# Patient Record
Sex: MALE | Race: WHITE | URBAN - METROPOLITAN AREA
[De-identification: names, ages, dates, MRNs, and addresses within clinical notes are randomized per-mention and may not be internally consistent; named-entity substitution may affect disease eponyms.]

---

## 2019-06-26 ENCOUNTER — OUTPATIENT (OUTPATIENT)
Dept: OUTPATIENT SERVICES | Facility: HOSPITAL | Age: 79
LOS: 1 days | Discharge: HOME | End: 2019-06-26
Payer: MEDICARE

## 2019-06-26 VITALS
SYSTOLIC BLOOD PRESSURE: 163 MMHG | DIASTOLIC BLOOD PRESSURE: 93 MMHG | OXYGEN SATURATION: 97 % | TEMPERATURE: 98 F | HEIGHT: 70 IN | HEART RATE: 79 BPM | WEIGHT: 242.07 LBS | RESPIRATION RATE: 20 BRPM

## 2019-06-26 DIAGNOSIS — E78.00 PURE HYPERCHOLESTEROLEMIA, UNSPECIFIED: ICD-10-CM

## 2019-06-26 DIAGNOSIS — I48.91 UNSPECIFIED ATRIAL FIBRILLATION: ICD-10-CM

## 2019-06-26 DIAGNOSIS — Z01.818 ENCOUNTER FOR OTHER PREPROCEDURAL EXAMINATION: ICD-10-CM

## 2019-06-26 DIAGNOSIS — M16.12 UNILATERAL PRIMARY OSTEOARTHRITIS, LEFT HIP: ICD-10-CM

## 2019-06-26 DIAGNOSIS — Z72.89 OTHER PROBLEMS RELATED TO LIFESTYLE: ICD-10-CM

## 2019-06-26 DIAGNOSIS — E78.5 HYPERLIPIDEMIA, UNSPECIFIED: ICD-10-CM

## 2019-06-26 DIAGNOSIS — N40.0 BENIGN PROSTATIC HYPERPLASIA WITHOUT LOWER URINARY TRACT SYMPTOMS: ICD-10-CM

## 2019-06-26 DIAGNOSIS — I10 ESSENTIAL (PRIMARY) HYPERTENSION: ICD-10-CM

## 2019-06-26 DIAGNOSIS — Z29.9 ENCOUNTER FOR PROPHYLACTIC MEASURES, UNSPECIFIED: ICD-10-CM

## 2019-06-26 DIAGNOSIS — Z98.890 OTHER SPECIFIED POSTPROCEDURAL STATES: Chronic | ICD-10-CM

## 2019-06-26 LAB
ANION GAP SERPL CALC-SCNC: 6 MMOL/L — SIGNIFICANT CHANGE UP (ref 5–17)
APPEARANCE UR: ABNORMAL
BASOPHILS # BLD AUTO: 0.02 K/UL — SIGNIFICANT CHANGE UP (ref 0–0.2)
BASOPHILS NFR BLD AUTO: 0.4 % — SIGNIFICANT CHANGE UP (ref 0–2)
BILIRUB UR-MCNC: NEGATIVE — SIGNIFICANT CHANGE UP
BLD GP AB SCN SERPL QL: SIGNIFICANT CHANGE UP
BUN SERPL-MCNC: 15 MG/DL — SIGNIFICANT CHANGE UP (ref 7–23)
CALCIUM SERPL-MCNC: 9.3 MG/DL — SIGNIFICANT CHANGE UP (ref 8.5–10.1)
CHLORIDE SERPL-SCNC: 106 MMOL/L — SIGNIFICANT CHANGE UP (ref 96–108)
CO2 SERPL-SCNC: 27 MMOL/L — SIGNIFICANT CHANGE UP (ref 22–31)
COLOR SPEC: YELLOW — SIGNIFICANT CHANGE UP
COMMENT - URINE: SIGNIFICANT CHANGE UP
CREAT SERPL-MCNC: 0.91 MG/DL — SIGNIFICANT CHANGE UP (ref 0.5–1.3)
DIFF PNL FLD: NEGATIVE — SIGNIFICANT CHANGE UP
EOSINOPHIL # BLD AUTO: 0.11 K/UL — SIGNIFICANT CHANGE UP (ref 0–0.5)
EOSINOPHIL NFR BLD AUTO: 2.2 % — SIGNIFICANT CHANGE UP (ref 0–6)
EPI CELLS # UR: SIGNIFICANT CHANGE UP
GLUCOSE SERPL-MCNC: 95 MG/DL — SIGNIFICANT CHANGE UP (ref 70–99)
GLUCOSE UR QL: NEGATIVE MG/DL — SIGNIFICANT CHANGE UP
HCT VFR BLD CALC: 47.7 % — SIGNIFICANT CHANGE UP (ref 39–50)
HGB BLD-MCNC: 15.9 G/DL — SIGNIFICANT CHANGE UP (ref 13–17)
IMM GRANULOCYTES NFR BLD AUTO: 0.2 % — SIGNIFICANT CHANGE UP (ref 0–1.5)
KETONES UR-MCNC: NEGATIVE — SIGNIFICANT CHANGE UP
LEUKOCYTE ESTERASE UR-ACNC: NEGATIVE — SIGNIFICANT CHANGE UP
LYMPHOCYTES # BLD AUTO: 1.48 K/UL — SIGNIFICANT CHANGE UP (ref 1–3.3)
LYMPHOCYTES # BLD AUTO: 30.2 % — SIGNIFICANT CHANGE UP (ref 13–44)
MCHC RBC-ENTMCNC: 32.4 PG — SIGNIFICANT CHANGE UP (ref 27–34)
MCHC RBC-ENTMCNC: 33.3 GM/DL — SIGNIFICANT CHANGE UP (ref 32–36)
MCV RBC AUTO: 97.3 FL — SIGNIFICANT CHANGE UP (ref 80–100)
MONOCYTES # BLD AUTO: 0.53 K/UL — SIGNIFICANT CHANGE UP (ref 0–0.9)
MONOCYTES NFR BLD AUTO: 10.8 % — SIGNIFICANT CHANGE UP (ref 2–14)
MRSA PCR RESULT.: SIGNIFICANT CHANGE UP
NEUTROPHILS # BLD AUTO: 2.75 K/UL — SIGNIFICANT CHANGE UP (ref 1.8–7.4)
NEUTROPHILS NFR BLD AUTO: 56.2 % — SIGNIFICANT CHANGE UP (ref 43–77)
NITRITE UR-MCNC: NEGATIVE — SIGNIFICANT CHANGE UP
PH UR: 6.5 — SIGNIFICANT CHANGE UP (ref 5–8)
PLATELET # BLD AUTO: 176 K/UL — SIGNIFICANT CHANGE UP (ref 150–400)
POTASSIUM SERPL-MCNC: 4.4 MMOL/L — SIGNIFICANT CHANGE UP (ref 3.5–5.3)
POTASSIUM SERPL-SCNC: 4.4 MMOL/L — SIGNIFICANT CHANGE UP (ref 3.5–5.3)
PROT UR-MCNC: 30 MG/DL
RBC # BLD: 4.9 M/UL — SIGNIFICANT CHANGE UP (ref 4.2–5.8)
RBC # FLD: 13.2 % — SIGNIFICANT CHANGE UP (ref 10.3–14.5)
RBC CASTS # UR COMP ASSIST: NEGATIVE /HPF — SIGNIFICANT CHANGE UP (ref 0–4)
S AUREUS DNA NOSE QL NAA+PROBE: SIGNIFICANT CHANGE UP
SODIUM SERPL-SCNC: 139 MMOL/L — SIGNIFICANT CHANGE UP (ref 135–145)
SP GR SPEC: 1.02 — SIGNIFICANT CHANGE UP (ref 1.01–1.02)
TYPE + AB SCN PNL BLD: SIGNIFICANT CHANGE UP
UROBILINOGEN FLD QL: NEGATIVE MG/DL — SIGNIFICANT CHANGE UP
WBC # BLD: 4.9 K/UL — SIGNIFICANT CHANGE UP (ref 3.8–10.5)
WBC # FLD AUTO: 4.9 K/UL — SIGNIFICANT CHANGE UP (ref 3.8–10.5)
WBC UR QL: SIGNIFICANT CHANGE UP

## 2019-06-26 PROCEDURE — 93010 ELECTROCARDIOGRAM REPORT: CPT

## 2019-06-26 PROCEDURE — 71046 X-RAY EXAM CHEST 2 VIEWS: CPT | Mod: 26

## 2019-06-26 NOTE — H&P PST ADULT - NSICDXPASTMEDICALHX_GEN_ALL_CORE_FT
PAST MEDICAL HISTORY:  Atrial fibrillation     BPH (benign prostatic hyperplasia)     BPH (Benign Prostatic Hypertrophy) laser 2010    H/O varicose veins     Hearing loss     HTN (hypertension)     Hypercholesteremia     Legally blind right eye    OA (osteoarthritis) of hip     Obesity PAST MEDICAL HISTORY:  Atrial fibrillation     BPH (Benign Prostatic Hypertrophy) laser 2010    H/O varicose veins     Hearing loss     HTN (hypertension)     Hypercholesteremia     Legally blind right eye    OA (osteoarthritis) of hip     Obesity

## 2019-06-26 NOTE — H&P PST ADULT - NSANTHOSAYNRD_GEN_A_CORE
No. MARIA C screening performed.  STOP BANG Legend: 0-2 = LOW Risk; 3-4 = INTERMEDIATE Risk; 5-8 = HIGH Risk

## 2019-06-26 NOTE — H&P PST ADULT - HISTORY OF PRESENT ILLNESS
79 y/o male with left hip osteoarthritis. Complain of chronic left hip pain. Denies taking medications, "they don't help." Scheduled for left THR. 77 y/o male with left hip osteoarthritis. Complain of chronic left hip pain. Denies taking medications, "they don't help." Pt has difficulty walking due to hip pain. Scheduled for left THR.

## 2019-06-26 NOTE — H&P PST ADULT - ASSESSMENT
77 y/o male with left hip osteoarthritis. Complain of chronic left hip pain. Scheduled for left THR.   Plan  1. Stop all NSAIDS, herbal supplements and vitamins for 7 days.  2. NPO at midnight.  3. Take the following medications ( Metoprolol, Lisinopril) with small sips of water on the morning of your procedure/surgery.  4. Use EZ sponges as directed  5. Use mupirocin as directed  6. Labs, EKG, CXR as per surgeon  7. PMD/cardiologist visit for optimization prior to surgery as per surgeon.    CAPRINI SCORE [CLOT]  AGE RELATED RISK FACTORS                                                       MOBILITY RELATED FACTORS  [ ] Age 41-60 years                                            (1 Point)                  [ ] Bed rest                                                        (1 Point)  [ ] Age: 61-74 years                                           (2 Points)                 [ ] Plaster cast                                                   (2 Points)  [ x ] Age= 75 years                                              (3 Points)                 [ ] Bed bound for more than 72 hours                 (2 Points)    DISEASE RELATED RISK FACTORS                                               GENDER SPECIFIC FACTORS  [ ] Edema in the lower extremities                       (1 Point)                  [ ] Pregnancy                                                     (1 Point)  [ ] Varicose veins                                               (1 Point)                  [ ] Post-partum < 6 weeks                                   (1 Point)             [ x ] BMI > 25 Kg/m2                                            (1 Point)                  [ ] Hormonal therapy  or oral contraception          (1 Point)                 [ ] Sepsis (in the previous month)                        (1 Point)                  [ ] History of pregnancy complications                 (1 point)  [ ] Pneumonia or serious lung disease                                               [ ] Unexplained or recurrent                     (1 Point)           (in the previous month)                               (1 Point)  [ ] Abnormal pulmonary function test                     (1 Point)                 SURGERY RELATED RISK FACTORS  [ ] Acute myocardial infarction                              (1 Point)                 [ ]  Section                                             (1 Point)  [ ] Congestive heart failure (in the previous month)  (1 Point)               [ ] Minor surgery                                                  (1 Point)   [ ] Inflammatory bowel disease                             (1 Point)                 [ ] Arthroscopic surgery                                        (2 Points)  [ ] Central venous access                                      (2 Points)                [ ] General surgery lasting more than 45 minutes   (2 Points)       [ ] Stroke (in the previous month)                          (5 Points)               [x  ] Elective arthroplasty                                         (5 Points)     ()  malignancy                                                             (2 points )                                                                                                                                      HEMATOLOGY RELATED FACTORS                                                 TRAUMA RELATED RISK FACTORS  [ ] Prior episodes of VTE                                     (3 Points)                 [ ] Fracture of the hip, pelvis, or leg                       (5 Points)  [ ] Positive family history for VTE                         (3 Points)                 [ ] Acute spinal cord injury (in the previous month)  (5 Points)  [ ] Prothrombin 20736 A                                     (3 Points)                 [ ] Paralysis  (less than 1 month)                             (5 Points)  [ ] Factor V Leiden                                             (3 Points)                  [ ] Multiple Trauma within 1 month                        (5 Points)  [ ] Lupus anticoagulants                                     (3 Points)                                                           [ ] Anticardiolipin antibodies                               (3 Points)                                                       [ ] High homocysteine in the blood                      (3 Points)                                             [ ] Other congenital or acquired thrombophilia      (3 Points)                                                [ ] Heparin induced thrombocytopenia                  (3 Points)    (  ) Malignancy                                        Total Score [     9     ]

## 2019-06-26 NOTE — H&P PST ADULT - NSICDXPASTSURGICALHX_GEN_ALL_CORE_FT
PAST SURGICAL HISTORY:  H/O colonoscopy     H/O cystoscopy TURP 2017    History of Cataract Surgery right eye 2010

## 2019-07-11 ENCOUNTER — RESULT REVIEW (OUTPATIENT)
Age: 79
End: 2019-07-11

## 2019-07-11 ENCOUNTER — INPATIENT (INPATIENT)
Facility: HOSPITAL | Age: 79
LOS: 2 days | Discharge: HOME HEALTH | End: 2019-07-14
Attending: ORTHOPAEDIC SURGERY | Admitting: ORTHOPAEDIC SURGERY
Payer: MEDICARE

## 2019-07-11 ENCOUNTER — TRANSCRIPTION ENCOUNTER (OUTPATIENT)
Age: 79
End: 2019-07-11

## 2019-07-11 VITALS
WEIGHT: 238.1 LBS | OXYGEN SATURATION: 96 % | DIASTOLIC BLOOD PRESSURE: 117 MMHG | RESPIRATION RATE: 17 BRPM | TEMPERATURE: 98 F | HEART RATE: 86 BPM | SYSTOLIC BLOOD PRESSURE: 176 MMHG

## 2019-07-11 DIAGNOSIS — Z98.890 OTHER SPECIFIED POSTPROCEDURAL STATES: Chronic | ICD-10-CM

## 2019-07-11 LAB
ANION GAP SERPL CALC-SCNC: 6 MMOL/L — SIGNIFICANT CHANGE UP (ref 5–17)
BUN SERPL-MCNC: 17 MG/DL — SIGNIFICANT CHANGE UP (ref 7–23)
CALCIUM SERPL-MCNC: 8.5 MG/DL — SIGNIFICANT CHANGE UP (ref 8.5–10.1)
CHLORIDE SERPL-SCNC: 108 MMOL/L — SIGNIFICANT CHANGE UP (ref 96–108)
CO2 SERPL-SCNC: 27 MMOL/L — SIGNIFICANT CHANGE UP (ref 22–31)
CREAT SERPL-MCNC: 0.92 MG/DL — SIGNIFICANT CHANGE UP (ref 0.5–1.3)
GLUCOSE SERPL-MCNC: 86 MG/DL — SIGNIFICANT CHANGE UP (ref 70–99)
HCT VFR BLD CALC: 41.3 % — SIGNIFICANT CHANGE UP (ref 39–50)
HGB BLD-MCNC: 13.5 G/DL — SIGNIFICANT CHANGE UP (ref 13–17)
INR BLD: 1.09 RATIO — SIGNIFICANT CHANGE UP (ref 0.88–1.16)
MCHC RBC-ENTMCNC: 32.7 GM/DL — SIGNIFICANT CHANGE UP (ref 32–36)
MCHC RBC-ENTMCNC: 32.7 PG — SIGNIFICANT CHANGE UP (ref 27–34)
MCV RBC AUTO: 100 FL — SIGNIFICANT CHANGE UP (ref 80–100)
PLATELET # BLD AUTO: 154 K/UL — SIGNIFICANT CHANGE UP (ref 150–400)
POTASSIUM SERPL-MCNC: 4.3 MMOL/L — SIGNIFICANT CHANGE UP (ref 3.5–5.3)
POTASSIUM SERPL-SCNC: 4.3 MMOL/L — SIGNIFICANT CHANGE UP (ref 3.5–5.3)
PROTHROM AB SERPL-ACNC: 12.1 SEC — SIGNIFICANT CHANGE UP (ref 10–12.9)
RBC # BLD: 4.13 M/UL — LOW (ref 4.2–5.8)
RBC # FLD: 13.2 % — SIGNIFICANT CHANGE UP (ref 10.3–14.5)
SODIUM SERPL-SCNC: 141 MMOL/L — SIGNIFICANT CHANGE UP (ref 135–145)
WBC # BLD: 5.23 K/UL — SIGNIFICANT CHANGE UP (ref 3.8–10.5)
WBC # FLD AUTO: 5.23 K/UL — SIGNIFICANT CHANGE UP (ref 3.8–10.5)

## 2019-07-11 PROCEDURE — 88305 TISSUE EXAM BY PATHOLOGIST: CPT | Mod: 26

## 2019-07-11 PROCEDURE — 73501 X-RAY EXAM HIP UNI 1 VIEW: CPT | Mod: 26,LT

## 2019-07-11 PROCEDURE — 99223 1ST HOSP IP/OBS HIGH 75: CPT

## 2019-07-11 PROCEDURE — 27130 TOTAL HIP ARTHROPLASTY: CPT | Mod: AS

## 2019-07-11 RX ORDER — DOCUSATE SODIUM 100 MG
1 CAPSULE ORAL
Qty: 14 | Refills: 0
Start: 2019-07-11 | End: 2019-07-17

## 2019-07-11 RX ORDER — PANTOPRAZOLE SODIUM 20 MG/1
1 TABLET, DELAYED RELEASE ORAL
Qty: 30 | Refills: 0
Start: 2019-07-11 | End: 2019-08-09

## 2019-07-11 RX ORDER — HYDROMORPHONE HYDROCHLORIDE 2 MG/ML
0.5 INJECTION INTRAMUSCULAR; INTRAVENOUS; SUBCUTANEOUS
Refills: 0 | Status: DISCONTINUED | OUTPATIENT
Start: 2019-07-11 | End: 2019-07-11

## 2019-07-11 RX ORDER — DOCUSATE SODIUM 100 MG
100 CAPSULE ORAL THREE TIMES A DAY
Refills: 0 | Status: DISCONTINUED | OUTPATIENT
Start: 2019-07-11 | End: 2019-07-14

## 2019-07-11 RX ORDER — SODIUM CHLORIDE 9 MG/ML
1000 INJECTION, SOLUTION INTRAVENOUS
Refills: 0 | Status: DISCONTINUED | OUTPATIENT
Start: 2019-07-11 | End: 2019-07-14

## 2019-07-11 RX ORDER — ONDANSETRON 8 MG/1
4 TABLET, FILM COATED ORAL EVERY 6 HOURS
Refills: 0 | Status: DISCONTINUED | OUTPATIENT
Start: 2019-07-11 | End: 2019-07-14

## 2019-07-11 RX ORDER — PANTOPRAZOLE SODIUM 20 MG/1
40 TABLET, DELAYED RELEASE ORAL ONCE
Refills: 0 | Status: COMPLETED | OUTPATIENT
Start: 2019-07-11 | End: 2019-07-11

## 2019-07-11 RX ORDER — HYDROMORPHONE HYDROCHLORIDE 2 MG/ML
0.5 INJECTION INTRAMUSCULAR; INTRAVENOUS; SUBCUTANEOUS EVERY 4 HOURS
Refills: 0 | Status: DISCONTINUED | OUTPATIENT
Start: 2019-07-11 | End: 2019-07-14

## 2019-07-11 RX ORDER — OXYCODONE HYDROCHLORIDE 5 MG/1
10 TABLET ORAL EVERY 4 HOURS
Refills: 0 | Status: DISCONTINUED | OUTPATIENT
Start: 2019-07-11 | End: 2019-07-14

## 2019-07-11 RX ORDER — OXYCODONE HYDROCHLORIDE 5 MG/1
5 TABLET ORAL EVERY 4 HOURS
Refills: 0 | Status: DISCONTINUED | OUTPATIENT
Start: 2019-07-11 | End: 2019-07-14

## 2019-07-11 RX ORDER — SODIUM CHLORIDE 9 MG/ML
1000 INJECTION, SOLUTION INTRAVENOUS
Refills: 0 | Status: DISCONTINUED | OUTPATIENT
Start: 2019-07-11 | End: 2019-07-11

## 2019-07-11 RX ORDER — APIXABAN 2.5 MG/1
1 TABLET, FILM COATED ORAL
Qty: 0 | Refills: 0 | DISCHARGE

## 2019-07-11 RX ORDER — ACETAMINOPHEN 500 MG
650 TABLET ORAL EVERY 6 HOURS
Refills: 0 | Status: DISCONTINUED | OUTPATIENT
Start: 2019-07-11 | End: 2019-07-14

## 2019-07-11 RX ORDER — ACETAMINOPHEN 500 MG
1000 TABLET ORAL ONCE
Refills: 0 | Status: COMPLETED | OUTPATIENT
Start: 2019-07-11 | End: 2019-07-11

## 2019-07-11 RX ORDER — SENNA PLUS 8.6 MG/1
2 TABLET ORAL AT BEDTIME
Refills: 0 | Status: DISCONTINUED | OUTPATIENT
Start: 2019-07-11 | End: 2019-07-14

## 2019-07-11 RX ORDER — OXYCODONE HYDROCHLORIDE 5 MG/1
10 TABLET ORAL ONCE
Refills: 0 | Status: DISCONTINUED | OUTPATIENT
Start: 2019-07-11 | End: 2019-07-11

## 2019-07-11 RX ORDER — METOPROLOL TARTRATE 50 MG
100 TABLET ORAL DAILY
Refills: 0 | Status: DISCONTINUED | OUTPATIENT
Start: 2019-07-11 | End: 2019-07-14

## 2019-07-11 RX ORDER — PANTOPRAZOLE SODIUM 20 MG/1
40 TABLET, DELAYED RELEASE ORAL
Refills: 0 | Status: DISCONTINUED | OUTPATIENT
Start: 2019-07-11 | End: 2019-07-14

## 2019-07-11 RX ORDER — APIXABAN 2.5 MG/1
5 TABLET, FILM COATED ORAL EVERY 12 HOURS
Refills: 0 | Status: DISCONTINUED | OUTPATIENT
Start: 2019-07-12 | End: 2019-07-14

## 2019-07-11 RX ORDER — ACETAMINOPHEN 500 MG
650 TABLET ORAL ONCE
Refills: 0 | Status: COMPLETED | OUTPATIENT
Start: 2019-07-11 | End: 2019-07-11

## 2019-07-11 RX ORDER — LISINOPRIL 2.5 MG/1
5 TABLET ORAL DAILY
Refills: 0 | Status: DISCONTINUED | OUTPATIENT
Start: 2019-07-11 | End: 2019-07-14

## 2019-07-11 RX ORDER — CEFAZOLIN SODIUM 1 G
2000 VIAL (EA) INJECTION EVERY 8 HOURS
Refills: 0 | Status: COMPLETED | OUTPATIENT
Start: 2019-07-11 | End: 2019-07-12

## 2019-07-11 RX ORDER — POLYETHYLENE GLYCOL 3350 17 G/17G
17 POWDER, FOR SOLUTION ORAL DAILY
Refills: 0 | Status: DISCONTINUED | OUTPATIENT
Start: 2019-07-11 | End: 2019-07-14

## 2019-07-11 RX ORDER — FENTANYL CITRATE 50 UG/ML
50 INJECTION INTRAVENOUS
Refills: 0 | Status: DISCONTINUED | OUTPATIENT
Start: 2019-07-11 | End: 2019-07-11

## 2019-07-11 RX ORDER — BENZOCAINE AND MENTHOL 5; 1 G/100ML; G/100ML
1 LIQUID ORAL EVERY 4 HOURS
Refills: 0 | Status: DISCONTINUED | OUTPATIENT
Start: 2019-07-11 | End: 2019-07-14

## 2019-07-11 RX ADMIN — OXYCODONE HYDROCHLORIDE 10 MILLIGRAM(S): 5 TABLET ORAL at 23:30

## 2019-07-11 RX ADMIN — OXYCODONE HYDROCHLORIDE 10 MILLIGRAM(S): 5 TABLET ORAL at 17:48

## 2019-07-11 RX ADMIN — Medication 1000 MILLIGRAM(S): at 19:30

## 2019-07-11 RX ADMIN — Medication 400 MILLIGRAM(S): at 19:15

## 2019-07-11 RX ADMIN — OXYCODONE HYDROCHLORIDE 10 MILLIGRAM(S): 5 TABLET ORAL at 18:15

## 2019-07-11 RX ADMIN — Medication 100 MILLIGRAM(S): at 21:44

## 2019-07-11 RX ADMIN — OXYCODONE HYDROCHLORIDE 10 MILLIGRAM(S): 5 TABLET ORAL at 23:00

## 2019-07-11 RX ADMIN — Medication 650 MILLIGRAM(S): at 11:36

## 2019-07-11 RX ADMIN — PANTOPRAZOLE SODIUM 40 MILLIGRAM(S): 20 TABLET, DELAYED RELEASE ORAL at 11:36

## 2019-07-11 NOTE — PHYSICAL THERAPY INITIAL EVALUATION ADULT - LEVEL OF INDEPENDENCE, REHAB EVAL
ABD pillow between B legs ,rolling to R uninvolved hip/moderate assist (50% patients effort)/maximum assist (25% patients effort)

## 2019-07-11 NOTE — PHYSICAL THERAPY INITIAL EVALUATION ADULT - ASR EQUIP NEEDS DISCH PT EVAL
3:1 commode/pt states he has a RW used by his wife ,needs to check if it can be adjusted  for his height

## 2019-07-11 NOTE — CONSULT NOTE ADULT - SUBJECTIVE AND OBJECTIVE BOX
HPI:      Patient is a 78y old  Male who presents with a chief complaint of Left hip arthroplasty (2019 08:30)  pt s/p  left thr on 19.    Consulted by Dr. Don Strickland   for VTE prophylaxis, risk stratification, and anticoagulation management.    PAST MEDICAL & SURGICAL HISTORY:  H/O varicose veins  OA (osteoarthritis) of hip  Hypercholesteremia  HTN (hypertension)  Atrial fibrillation on eliquis  Legally blind: right eye  Hearing loss  Obesity  BPH (Benign Prostatic Hypertrophy): laser   H/O colonoscopy  H/O cystoscopy: TURP   History of Cataract Surgery: right eye 2010    CAPRINI SCORE  AGE RELATED RISK FACTORS                                                       MOBILITY RELATED FACTORS  [ ] Age 41-60 years                                            (1 Point)                  [ ] Bed rest                                                        (1 Point)  [ ] Age: 61-74 years                                           (2 Points)                [ ] Plaster cast                                                   (2 Points)  [ x] Age= 75 years                                              (3 Points)                 [ ] Bed bound for more than 72 hours                   (2 Points)    DISEASE RELATED RISK FACTORS                                               GENDER SPECIFIC FACTORS  [ ] Edema in the lower extremities                       (1 Point)           [ ] Pregnancy                                                            (1 Point)  [ ] Varicose veins                                               (1 Point)                  [ ] Post-partum < 6 weeks                                      (1 Point)             [ x] BMI > 25 Kg/m2                                            (1 Point)                  [ ] Hormonal therapy or oral contraception       (1 Point)                 [ ] Sepsis (in the previous month)                        (1 Point)             [ ] History of pregnancy complications                (1Point)  [ ] Pneumonia or serious lung disease                                             [ ] Unexplained or recurrent  (=/>3), premature                                 (In the previous month)                               (1 Point)                birth with toxemia or growth-restricted infant (1 Point)  [ ] Abnormal pulmonary function test            (1 Point)                                   SURGERY RELATED RISK FACTORS  [ ] Acute myocardial infarction                       (1 Point)                  [ ]  Section                                         (1 Point)  [ ] Congestive heart failure (in the previous month) (1 Point)   [ ] Minor surgery   lasting <45 minutes       (1 Point)   [ ] Inflammatory bowel disease                             (1 Point)          [ ] Arthroscopic surgery                                  (2 Points)  [ ] Central venous access                                    (2 Points)            [ ] General surgery lasting >45 minutes      (2 Points)       [ ] Stroke (in the previous month)                  (5 Points)            [x ] Elective major lower extremity arthroplasty (5 Points)                                   [  ] Malignancy (present or past include skin melanoma                                          but exclude  basal skin cell)    (2 points)                                      TRAUMA RELATED RISK FACTORS                HEMATOLOGY RELATED FACTORS                                  [ ] Fracture of the hip, pelvis, or leg                       (5 Points)  [ ] Prior episodes of VTE                                     (3 Points)          [ ] Acute spinal cord injury (in the previous month)  (5 Points)  [ ] Positive family history for VTE                         (3 Points)       [ ] Paralysis (less than 1 month)                          (5 Points)  [ ] Prothrombin 40513 A                                      (3 Points)         [ ] Multiple Trauma (within 1month)                 (5Points)                                                                                                                                                                [ ] Factor V Leiden                                          (3 Points)                                OTHER RISK FACTORS                          [ ] Lupus anticoagulants                                     (3 Points)                       [ ] BMI > 40                          (1 Point)                                                         [ ] Anticardiolipin antibodies                                (3 Points)                   [ ] Smoking                              (1Point)                                                [ ] High homocysteine in the blood                      (3 Points)                [  ] Diabetes requiring insulin (1point)                         [ ] Other congenital or acquired thrombophilia       (3 Points)          [  ] Chemotherapy                   (1 Point)  [ ] Heparin induced thrombocytopenia                  (3 Points)             [  ] Blood Transfusion                (1 point)                                                                                                             Total Score [8 ]                                                                                                                                                                                                                                     [id number] Score: 3    IMPROVE Bleeding Risk Score: 2.5    Falls Risk:   High ( x )  Mod (  )  Low (  )    EBL: 200  ml  cr cl: 98.4  19 Pt seen at bedside on [address], with daughters present.  pt in pain scaled 10/10.  RN aware getting pain meds for pt.  Discussed his anticoagualtion with eliquis , which he is on normally for his A. Fib.  Made him aware we will resume it in the a.m due to his late surgical procedure.   Vital Signs Last 24 Hrs  T(C): 36.1 (2019 17:45), Max: 36.6 (2019 14:45)  T(F): 97 (2019 17:45), Max: 97.9 (2019 14:45)  HR: 83 (2019 18:20) (60 - 86)  BP: 143/89 (2019 18:20) (104/63 - 176/117)  BP(mean): --  RR: 16 (2019 18:20) (14 - 22)  SpO2: 95% (2019 18:20) (95% - 100%)  FAMILY HISTORY:  Family history of melanoma: father    Denies any personal or familial history of clotting or bleeding disorders.    Allergies    No Known Allergies    Intolerances        REVIEW OF SYSTEMS    (  )Fever	     (  )Constipation	(  )SOB				(  )Headache	(  )Dysuria  (  )Chills	     (  )Melena	(  )Dyspnea present on exertion	                    (  )Dizziness                    (  )Polyuria  (  )Nausea	     (  )Hematochezia	(  )Cough			                    (  )Syncope   	(  )Hematuria  (  )Vomiting    (  )Chest Pain	(  )Wheezing			( x )Weakness   (x) pain left hip  (  )Diarrhea     (  )Palpitations	(  )Anorexia			(x  )Myalgia    Pertinent positives in HPI and daily subjective.  All other ROS negative.    PHYSICAL EXAM:    Constitutional: Appears Well    Neurological: A& O x 3    Skin: Warm    Respiratory and Thorax: normal effort; Breath sounds: normal; No rales/wheezing/rhonchi  	  Cardiovascular: S1, S2, regular, NMBR	    Gastrointestinal: BS + x 4Q, nontender	    Genitourinary:  Bladder nondistended, nontender    Musculoskeletal:   General Right:   no muscle/joint tenderness,   normal tone, no joint swelling,   ROM: full	    General Left:   no muscle/joint tenderness,   normal tone, no joint swelling,   ROM: limitedl    Hip:  left: Dressing CDI; Drain:   Lower extrems:   Right: no calf tenderness              negative joshua's sign               + pedal pulses    Left:   no calf tenderness              negative joshua's sign               + pedal pulses                          13.5   5.23  )-----------( 154      ( 2019 15:08 )             41.3       07-11    141  |  108  |  17  ----------------------------<  86  4.3   |  27  |  0.92    Ca    8.5      2019 15:08        PT/INR - ( 2019 10:54 )   PT: 12.1 sec;   INR: 1.09 ratio         				    MEDICATIONS  (STANDING):  ceFAZolin   IVPB 2000 milliGRAM(s) IV Intermittent every 8 hours  docusate sodium 100 milliGRAM(s) Oral three times a day  lactated ringers. 1000 milliLiter(s) IV Continuous <Continuous>  lisinopril 5 milliGRAM(s) Oral daily  metoprolol succinate  milliGRAM(s) Oral daily  pantoprazole    Tablet 40 milliGRAM(s) Oral before breakfast  polyethylene glycol 3350 17 Gram(s) Oral daily          DVT Prophylaxis:  LMWH                   (  )  Heparin SQ           (  )  Coumadin             (  )  Xarelto                  (  )  Eliquis                   ( in am )  Venodynes           (x  )  Ambulation          ( x )  UFH                       (  )  Contraindicated  (  )  EC Aspirin             (  )

## 2019-07-11 NOTE — DISCHARGE NOTE PROVIDER - HOSPITAL COURSE
H&P:    Pt is a 78y Male  PAST MEDICAL & SURGICAL HISTORY:    H/O varicose veins    OA (osteoarthritis) of hip    Hypercholesteremia    HTN (hypertension)    Atrial fibrillation    Legally blind: right eye    Hearing loss    Obesity    BPH (Benign Prostatic Hypertrophy): laser 2010    H/O colonoscopy    H/O cystoscopy: TURP 2017    History of Cataract Surgery: right eye 2010             Now s/p Left Total Hip Arthroplasty. Pt is afebrile with stable vital signs. Pain is controlled. Alert and Oriented. Exam reveals intact EHL FHL TA GS, +DP. Dressing is clean and dry with a New Aquacel bandage on.        VITALS**    LABS**        Hospital Course:    Patient presented to [facility] medically cleared for elective Left Hip Replacement Surgery, having failed outpatient conservative management. Prophylactic antibiotics were started before the procedure and continued for 24 hours. They were admitted after surgery to the orthopedic floor.   There were no complications during the hospital stay. All home medications were continued.         **Pt received **U PRBC post op for Acute Blood loss Anemia.        Routine consults were obtained from the Anticoagulation Team for DVT/PE prophylaxis, from Physical Therapy for twice daily PT, and from the Hospitalist for Medical Co-management. Patient was placed on anticoagulation.  Pertinent home medications were continued.  Daily labs were followed.          POD 0 pt was stable overnight.  No Major issues POD1-2. Pt received PT twice daily, and a new Aquacel dressing was applied prior to discharge. The plan is for for DC to home with home PT** or to Rehab for ongoing PT**.  The orthopedic Attending is aware and agrees. H&P:    Pt is a 78y Male      PAST MEDICAL & SURGICAL HISTORY:    H/O varicose veins    OA (osteoarthritis) of hip    Hypercholesteremia    HTN (hypertension)    Atrial fibrillation    Legally blind: right eye    Hearing loss    Obesity    BPH (Benign Prostatic Hypertrophy): laser 2010    H/O colonoscopy    H/O cystoscopy: TURP 2017    History of Cataract Surgery: right eye 2010             Now s/p Left Total Hip Arthroplasty. Pt is afebrile with stable vital signs. Pain is controlled. Alert and Oriented. Exam reveals intact EHL FHL TA GS, +DP. Dressing is clean and dry with a New Aquacel bandage on.        VITALS**    LABS**        Hospital Course:    Patient presented to [facility] medically cleared for elective Left Total Hip Replacement Surgery, having failed outpatient conservative management. Prophylactic antibiotics were started before the procedure and continued for 24 hours. They were admitted after surgery to the orthopedic floor.   There were no complications during the hospital stay. All home medications were continued.         **Pt received **U PRBC post op for Acute Blood loss Anemia.        Routine consults were obtained from the Anticoagulation Team for DVT/PE prophylaxis, from Physical Therapy for twice daily PT, and from the Hospitalist for Medical Co-management. Patient was placed on anticoagulation.  Pertinent home medications were continued.  Daily labs were followed.          POD 0 pt was stable overnight.  No Major issues POD1-2. Pt received PT twice daily, and a new Aquacel dressing was applied prior to discharge. The plan is for DC to home with home PT** or to Rehab for ongoing PT**.  The orthopedic Attending is aware and agrees. H&P:    Pt is a 78y Male      PAST MEDICAL & SURGICAL HISTORY:    H/O varicose veins    OA (osteoarthritis) of hip    Hypercholesteremia    HTN (hypertension)    Atrial fibrillation    Legally blind: right eye    Hearing loss    Obesity    BPH (Benign Prostatic Hypertrophy): laser 2010    H/O colonoscopy    H/O cystoscopy: TURP 2017    History of Cataract Surgery: right eye 2010             Now s/p Left Total Hip Arthroplasty. Pt is afebrile with stable vital signs. Pain is controlled. Alert and Oriented. Exam reveals intact EHL FHL TA GS, +DP. Dressing is clean and dry with a New Aquacel bandage on.        ICU Vital Signs Last 24 Hrs    T(C): 36.8 (14 Jul 2019 05:01), Max: 36.9 (13 Jul 2019 23:42)    T(F): 98.2 (14 Jul 2019 05:01), Max: 98.4 (13 Jul 2019 23:42)    HR: 77 (14 Jul 2019 05:01) (77 - 94)    BP: 122/65 (14 Jul 2019 05:01) (107/59 - 135/74)    BP(mean): 71 (13 Jul 2019 10:58) (71 - 71)    ABP: --    ABP(mean): --    RR: 18 (14 Jul 2019 05:01) (17 - 18)    SpO2: 96% (14 Jul 2019 05:01) (94% - 96%)                            13.6     8.29  )-----------( 157      ( 14 Jul 2019 06:53 )               40.4     07-14        139  |  106  |  18    ----------------------------<  90    3.8   |  27  |  0.63        Ca    9.0      14 Jul 2019 06:53                Hospital Course:    Patient presented to [facility] medically cleared for elective Left Total Hip Replacement Surgery, having failed outpatient conservative management. Prophylactic antibiotics were started before the procedure and continued for 24 hours. They were admitted after surgery to the orthopedic floor.   There were no complications during the hospital stay. All home medications were continued.         **Pt received **U PRBC post op for Acute Blood loss Anemia.        Routine consults were obtained from the Anticoagulation Team for DVT/PE prophylaxis, from Physical Therapy for twice daily PT, and from the Hospitalist for Medical Co-management. Patient was placed on anticoagulation.  Pertinent home medications were continued.  Daily labs were followed.          POD 0 pt was stable overnight.  No Major issues POD1-2. Pt received PT twice daily, and a new Aquacel dressing was applied prior to discharge. The plan is for DC to home with home PT.  The orthopedic Attending is aware and agrees.

## 2019-07-11 NOTE — PROGRESS NOTE ADULT - ASSESSMENT
A/P:  79 yo M s/p L MACHO POD 0:  -stable tolerated procedure well  -Abduction pillow  -posterior dislocation precautions  -pain control  -WBAT/PT/OT  -dvt ppx in am  -SCDs/Ice/OOB/IS  -FU Labs  -dispo planning

## 2019-07-11 NOTE — PHYSICAL THERAPY INITIAL EVALUATION ADULT - DISCHARGE DISPOSITION, PT EVAL
home w/ home PT/home w/ assist/pt states daughters will be staying with him on discharge while he recuperates

## 2019-07-11 NOTE — PROGRESS NOTE ADULT - SUBJECTIVE AND OBJECTIVE BOX
POST OP CHECK:  Pt stable, tolerated procedure well. Pt complaining of some pain, improved with repositioning. No other issues:    Vital Signs Last 24 Hrs  T(C): 36.1 (11 Jul 2019 17:45), Max: 36.6 (11 Jul 2019 14:45)  T(F): 97 (11 Jul 2019 17:45), Max: 97.9 (11 Jul 2019 14:45)  HR: 83 (11 Jul 2019 18:20) (60 - 86)  BP: 143/89 (11 Jul 2019 18:20) (104/63 - 176/117)  BP(mean): --  RR: 16 (11 Jul 2019 18:20) (14 - 22)  SpO2: 95% (11 Jul 2019 18:20) (95% - 100%)    PE:  Gen: NAD  LLE:   Dressing CDI  Compartments soft and compressible  No calf ttp  Abduction pillow in place  SCDs in place  +EHL/FHL/Gsc/TA  SILT L2-S1  2+ DP

## 2019-07-11 NOTE — PHYSICAL THERAPY INITIAL EVALUATION ADULT - ACTIVE RANGE OF MOTION EXAMINATION, REHAB EVAL
Left LE Active ROM was WFL (within functional limits)/AAROM LLE observing L THPs/bilateral upper extremity Active ROM was WFL (within functional limits)

## 2019-07-11 NOTE — CONSULT NOTE ADULT - ASSESSMENT
This is a 78 year old male with hx of osteoarthritis.  Now s/p left thr on 7-11-19.  Pt has a hx of A. Fib on eliquis.  [id number] Score: 3.  Pt has high thrombosis riska nd requires anticoagulation treatment dose.    plan:   Resume his eliquis 5mg every 12 hrs at 6a.m.  tomorrow:  Daily cbc/bmp  LE Venodyne  :increase mobility as per ortho  :thanks for consult will f/u

## 2019-07-11 NOTE — DISCHARGE NOTE PROVIDER - CARE PROVIDER_API CALL
Don Strickland)  Orthopaedic Surgery  [address] [place] [address] [place] [address]  Phone: [phone]  Fax: [phone]  Follow Up Time:

## 2019-07-11 NOTE — PHYSICAL THERAPY INITIAL EVALUATION ADULT - LIVES WITH, PROFILE
resides in [place] resides in [place],ran style home with 2 THAIS with HR  and 1 step down into sunken LR/alone

## 2019-07-11 NOTE — PHYSICAL THERAPY INITIAL EVALUATION ADULT - MANUAL MUSCLE TESTING RESULTS, REHAB EVAL
BUEs >4+/5 ,LLE 3-/5 proximally,4/5 distally ; RLE 1/5 due to residual effects of anaesthesia post-op

## 2019-07-11 NOTE — PHYSICAL THERAPY INITIAL EVALUATION ADULT - PLANNED THERAPY INTERVENTIONS, PT EVAL
transfer training/strengthening/ROM/gait training/bed mobility training/THPs LLE (posterior) ,ADLs,stair training

## 2019-07-11 NOTE — DISCHARGE NOTE PROVIDER - NSDCFUADDINST_GEN_ALL_CORE_FT
Discharge Instructions for Left Total Hip Arthroplasty    1. Diet: Resume previous diet  2. Activity: WBAT. Rolling walker. Posterior Hip Dislocation Precautions. Abduction Pillow while in bed for 6 weeks. Daily Physical Therapy.  3. Call with: fever over 101, wound redness, drainage or open area, calf pain/calf swelling.  4. Wound Care: Remove old and Place new Aquacel bandage to hip wound in 7days. No bandage needed after staple removal.  5. RN to Remove Staples Post Op Day #14 (7/25/19) so long as wound is healed, no drainage or open area.   6. OK to Shower with Aquacel. Must be an Aquacel. Avoid direct water beating on bandage.   7. DVT PE Prophylaxis: Managed by Anticoag Team. See Anticoagulation Instructions. See Med Rec.  8.  Continue Protonix daily while on Anticoagulant. An eRx has been sent to your pharmacy.  9. Labs: Check H&H weekly while on Anticoagulation. Check INR if on Coumadin.  10.  Follow Up: Dr. Strickland in 1 month. Call to schedule.    11. Pain Medication: eRX sent to your pharmacy for  if you go home.   12.***Please Call the office if any of you medications are not covered under your insurance, especially if it is a BLOOD CLOT PREVENTION/anticoagulant medication.

## 2019-07-11 NOTE — PHYSICAL THERAPY INITIAL EVALUATION ADULT - CRITERIA FOR SKILLED THERAPEUTIC INTERVENTIONS
risk reduction/prevention/rehab potential/therapy frequency/anticipated equipment needs at discharge/anticipated discharge recommendation/functional limitations in following categories/predicted duration of therapy intervention/impairments found

## 2019-07-11 NOTE — DISCHARGE NOTE PROVIDER - NSDCACTIVITY_GEN_ALL_CORE
Showering allowed/No heavy lifting/straining/Walking - Outdoors allowed/Stairs allowed/Walking - Indoors allowed

## 2019-07-11 NOTE — PHYSICAL THERAPY INITIAL EVALUATION ADULT - MARITAL STATUS
daughter Ramona Cotto is HCP,another dtr Татьяна Lamb/ /daughter Ramona Cotto is HCP, another dtr Татьяна Lamb daughter Ramona Cotto is HCP, another dtr Deysi Lamb is visiting from [place]/

## 2019-07-12 LAB
ANION GAP SERPL CALC-SCNC: 5 MMOL/L — SIGNIFICANT CHANGE UP (ref 5–17)
BUN SERPL-MCNC: 16 MG/DL — SIGNIFICANT CHANGE UP (ref 7–23)
CALCIUM SERPL-MCNC: 8.5 MG/DL — SIGNIFICANT CHANGE UP (ref 8.5–10.1)
CHLORIDE SERPL-SCNC: 104 MMOL/L — SIGNIFICANT CHANGE UP (ref 96–108)
CO2 SERPL-SCNC: 28 MMOL/L — SIGNIFICANT CHANGE UP (ref 22–31)
CREAT SERPL-MCNC: 0.88 MG/DL — SIGNIFICANT CHANGE UP (ref 0.5–1.3)
GLUCOSE SERPL-MCNC: 108 MG/DL — HIGH (ref 70–99)
HCT VFR BLD CALC: 41.4 % — SIGNIFICANT CHANGE UP (ref 39–50)
HGB BLD-MCNC: 13.6 G/DL — SIGNIFICANT CHANGE UP (ref 13–17)
MCHC RBC-ENTMCNC: 32.3 PG — SIGNIFICANT CHANGE UP (ref 27–34)
MCHC RBC-ENTMCNC: 32.9 GM/DL — SIGNIFICANT CHANGE UP (ref 32–36)
MCV RBC AUTO: 98.3 FL — SIGNIFICANT CHANGE UP (ref 80–100)
PLATELET # BLD AUTO: 182 K/UL — SIGNIFICANT CHANGE UP (ref 150–400)
POTASSIUM SERPL-MCNC: 4.2 MMOL/L — SIGNIFICANT CHANGE UP (ref 3.5–5.3)
POTASSIUM SERPL-SCNC: 4.2 MMOL/L — SIGNIFICANT CHANGE UP (ref 3.5–5.3)
RBC # BLD: 4.21 M/UL — SIGNIFICANT CHANGE UP (ref 4.2–5.8)
RBC # FLD: 13.2 % — SIGNIFICANT CHANGE UP (ref 10.3–14.5)
SODIUM SERPL-SCNC: 137 MMOL/L — SIGNIFICANT CHANGE UP (ref 135–145)
WBC # BLD: 7.12 K/UL — SIGNIFICANT CHANGE UP (ref 3.8–10.5)
WBC # FLD AUTO: 7.12 K/UL — SIGNIFICANT CHANGE UP (ref 3.8–10.5)

## 2019-07-12 PROCEDURE — 99232 SBSQ HOSP IP/OBS MODERATE 35: CPT

## 2019-07-12 RX ADMIN — Medication 100 MILLIGRAM(S): at 14:13

## 2019-07-12 RX ADMIN — Medication 650 MILLIGRAM(S): at 09:55

## 2019-07-12 RX ADMIN — LISINOPRIL 5 MILLIGRAM(S): 2.5 TABLET ORAL at 06:23

## 2019-07-12 RX ADMIN — APIXABAN 5 MILLIGRAM(S): 2.5 TABLET, FILM COATED ORAL at 07:35

## 2019-07-12 RX ADMIN — Medication 100 MILLIGRAM(S): at 06:21

## 2019-07-12 RX ADMIN — OXYCODONE HYDROCHLORIDE 10 MILLIGRAM(S): 5 TABLET ORAL at 14:45

## 2019-07-12 RX ADMIN — Medication 100 MILLIGRAM(S): at 06:23

## 2019-07-12 RX ADMIN — OXYCODONE HYDROCHLORIDE 10 MILLIGRAM(S): 5 TABLET ORAL at 04:33

## 2019-07-12 RX ADMIN — Medication 100 MILLIGRAM(S): at 06:22

## 2019-07-12 RX ADMIN — APIXABAN 5 MILLIGRAM(S): 2.5 TABLET, FILM COATED ORAL at 17:34

## 2019-07-12 RX ADMIN — Medication 650 MILLIGRAM(S): at 09:40

## 2019-07-12 RX ADMIN — OXYCODONE HYDROCHLORIDE 10 MILLIGRAM(S): 5 TABLET ORAL at 05:03

## 2019-07-12 RX ADMIN — PANTOPRAZOLE SODIUM 40 MILLIGRAM(S): 20 TABLET, DELAYED RELEASE ORAL at 06:22

## 2019-07-12 RX ADMIN — OXYCODONE HYDROCHLORIDE 10 MILLIGRAM(S): 5 TABLET ORAL at 14:13

## 2019-07-12 NOTE — DIETITIAN INITIAL EVALUATION ADULT. - OTHER INFO
Pt is a 79 yo F w/ PMH Atrial fib (on eliquis), obesity, OA, HTN, HL s/p Left THR. Pt s/p left total hip arthroplasty. Noted to be stable and tolerated procedure. Upon visit pts daughter at bedside. Pt reports he had first meal this am however disliked food and this afternoon he had a grilled cheese and felt better. Pt reports feeling very tired during assessment. Pt denies following modified diet PTA. Pt reports fair-good intake PTA however has difficulty having a regular lunch d/t work schedule. D/w pt heart healthy low Na diet and safe wt loss. However pt resistant and not totally ready for lifestyle change at this time. Left diet education at bedside and contact information. Pt currently on regular diet however recommend changing to DASH diet d/t PMH. Pt has no current c/o n/v/c/d. Reports intentional wt loss of 4# over 10 days. No noted edema. Encouraged adequate protein at meals to promote d/t pt s/p surgery.

## 2019-07-12 NOTE — DIETITIAN INITIAL EVALUATION ADULT. - ADD RECOMMEND
1. change diet to DASH diet. 2. reinforce diet edu PRN 3. weekly wt 4. encourage protein intake at each meal

## 2019-07-12 NOTE — PROGRESS NOTE ADULT - SUBJECTIVE AND OBJECTIVE BOX
HPI:      Patient is a 78y old  Male who presents with a chief complaint of Left hip arthroplasty (2019 08:30)  pt s/p  left thr on 19.    Consulted by Dr. Don Strickland   for VTE prophylaxis, risk stratification, and anticoagulation management.    PAST MEDICAL & SURGICAL HISTORY:  H/O varicose veins  OA (osteoarthritis) of hip  Hypercholesteremia  HTN (hypertension)  Atrial fibrillation on eliquis  Legally blind: right eye  Hearing loss  Obesity  BPH (Benign Prostatic Hypertrophy): laser   H/O colonoscopy  H/O cystoscopy: TURP   History of Cataract Surgery: right eye 2010    CAPRINI SCORE  AGE RELATED RISK FACTORS                                                       MOBILITY RELATED FACTORS  [ ] Age 41-60 years                                            (1 Point)                  [ ] Bed rest                                                        (1 Point)  [ ] Age: 61-74 years                                           (2 Points)                [ ] Plaster cast                                                   (2 Points)  [ x] Age= 75 years                                              (3 Points)                 [ ] Bed bound for more than 72 hours                   (2 Points)    DISEASE RELATED RISK FACTORS                                               GENDER SPECIFIC FACTORS  [ ] Edema in the lower extremities                       (1 Point)           [ ] Pregnancy                                                            (1 Point)  [ ] Varicose veins                                               (1 Point)                  [ ] Post-partum < 6 weeks                                      (1 Point)             [ x] BMI > 25 Kg/m2                                            (1 Point)                  [ ] Hormonal therapy or oral contraception       (1 Point)                 [ ] Sepsis (in the previous month)                        (1 Point)             [ ] History of pregnancy complications                (1Point)  [ ] Pneumonia or serious lung disease                                             [ ] Unexplained or recurrent  (=/>3), premature                                 (In the previous month)                               (1 Point)                birth with toxemia or growth-restricted infant (1 Point)  [ ] Abnormal pulmonary function test            (1 Point)                                   SURGERY RELATED RISK FACTORS  [ ] Acute myocardial infarction                       (1 Point)                  [ ]  Section                                         (1 Point)  [ ] Congestive heart failure (in the previous month) (1 Point)   [ ] Minor surgery   lasting <45 minutes       (1 Point)   [ ] Inflammatory bowel disease                             (1 Point)          [ ] Arthroscopic surgery                                  (2 Points)  [ ] Central venous access                                    (2 Points)            [ ] General surgery lasting >45 minutes      (2 Points)       [ ] Stroke (in the previous month)                  (5 Points)            [x ] Elective major lower extremity arthroplasty (5 Points)                                   [  ] Malignancy (present or past include skin melanoma                                          but exclude  basal skin cell)    (2 points)                                      TRAUMA RELATED RISK FACTORS                HEMATOLOGY RELATED FACTORS                                  [ ] Fracture of the hip, pelvis, or leg                       (5 Points)  [ ] Prior episodes of VTE                                     (3 Points)          [ ] Acute spinal cord injury (in the previous month)  (5 Points)  [ ] Positive family history for VTE                         (3 Points)       [ ] Paralysis (less than 1 month)                          (5 Points)  [ ] Prothrombin 39798 A                                      (3 Points)         [ ] Multiple Trauma (within 1month)                 (5Points)                                                                                                                                                                [ ] Factor V Leiden                                          (3 Points)                                OTHER RISK FACTORS                          [ ] Lupus anticoagulants                                     (3 Points)                       [ ] BMI > 40                          (1 Point)                                                         [ ] Anticardiolipin antibodies                                (3 Points)                   [ ] Smoking                              (1Point)                                                [ ] High homocysteine in the blood                      (3 Points)                [  ] Diabetes requiring insulin (1point)                         [ ] Other congenital or acquired thrombophilia       (3 Points)          [  ] Chemotherapy                   (1 Point)  [ ] Heparin induced thrombocytopenia                  (3 Points)             [  ] Blood Transfusion                (1 point)                                                                                                             Total Score [8 ]                                                                                                                                                                                                                                     [id number] Score: 3    IMPROVE Bleeding Risk Score: 2.5    Falls Risk:   High ( x )  Mod (  )  Low (  )    EBL: 200  ml  cr cl: 98.4  --19 Pt seen at bedside on [facility], with daughters present.  pt in pain scaled 10/10.  RN aware getting pain meds for pt.  Discussed his anticoagualtion with eliquis , which he is on normally for his A. Fib.  Made him aware we will resume it in the a.m due to his late surgical procedure.   -19 Pt seen at bedside on [facility] oob in chair.  Discussed his anticoagulation with Eliquis.  No concerns.  States he wants to go home when discharged     Vital Signs Last 24 Hrs  T(C): 36.7 (19 @ 11:40), Max: 36.7 (19 @ 11:40)  T(F): 98.1 (19 @ 11:40), Max: 98.1 (19 @ 11:40)  HR: 90 (19 @ 11:40) (60 - 90)  BP: 113/64 (19 @ 11:40) (104/63 - 145/87)  BP(mean): --  RR: 18 (19 @ 11:40) (14 - 22)  SpO2: 98% (19 @ 11:40) (94% - 100%)    FAMILY HISTORY:  Family history of melanoma: father    Denies any personal or familial history of clotting or bleeding disorders.    Allergies    No Known Allergies    Intolerances        REVIEW OF SYSTEMS    (  )Fever	     (  )Constipation	(  )SOB				(  )Headache	(  )Dysuria  (  )Chills	     (  )Melena	(  )Dyspnea present on exertion	                    (  )Dizziness                    (  )Polyuria  (  )Nausea	     (  )Hematochezia	(  )Cough			                    (  )Syncope   	(  )Hematuria  (  )Vomiting    (  )Chest Pain	(  )Wheezing			( x )Weakness   (x) pain left hip  (  )Diarrhea     (  )Palpitations	(  )Anorexia			(x  )Myalgia    Pertinent positives in HPI and daily subjective.  All other ROS negative.    PHYSICAL EXAM:    Constitutional: Appears Well    Neurological: A& O x 3    Skin: Warm    Respiratory and Thorax: normal effort; Breath sounds: normal; No rales/wheezing/rhonchi  	  Cardiovascular: S1, S2, regular, NMBR	    Gastrointestinal: BS + x 4Q, nontender	    Genitourinary:  Bladder nondistended, nontender    Musculoskeletal:   General Right:   no muscle/joint tenderness,   normal tone, no joint swelling,   ROM: full	    General Left:   no muscle/joint tenderness,   normal tone, no joint swelling,   ROM: limitedl    Hip:  left: Dressing CDI; Drain:   Lower extrems:   Right: no calf tenderness              negative joshua's sign               + pedal pulses    Left:   no calf tenderness              negative joshua's sign               + pedal pulses                          13.6   7.12  )-----------( 182      ( 2019 07:40 )             41.4       07-12    137  |  104  |  16  ----------------------------<  108<H>  4.2   |  28  |  0.88    Ca    8.5      2019 07:40        PT/INR - ( 2019 10:54 )   PT: 12.1 sec;   INR: 1.09 ratio                               13.5   5.23  )-----------( 154      ( 2019 15:08 )             41.3       07-11    141  |  108  |  17  ----------------------------<  86  4.3   |  27  |  0.92    Ca    8.5      2019 15:08        PT/INR - ( 2019 10:54 )   PT: 12.1 sec;   INR: 1.09 ratio         				    MEDICATIONS  (STANDING):  apixaban 5 milliGRAM(s) Oral every 12 hours  docusate sodium 100 milliGRAM(s) Oral three times a day  lactated ringers. 1000 milliLiter(s) IV Continuous <Continuous>  lisinopril 5 milliGRAM(s) Oral daily  metoprolol succinate  milliGRAM(s) Oral daily  pantoprazole    Tablet 40 milliGRAM(s) Oral before breakfast  polyethylene glycol 3350 17 Gram(s) Oral daily          DVT Prophylaxis:  LMWH                   (  )  Heparin SQ           (  )  Coumadin             (  )  Xarelto                  (  )  Eliquis                   ( x )  Venodynes           (x  )  Ambulation          ( x )  UFH                       (  )  Contraindicated  (  )  EC Aspirin             (  )

## 2019-07-12 NOTE — PROGRESS NOTE ADULT - ASSESSMENT
This is a 78 year old male with hx of osteoarthritis.  Now s/p left thr on 7-11-19.  Pt has a hx of A. Fib on eliquis.  [id number] Score: 3.  Pt has high thrombosis riska nd requires anticoagulation treatment dose.    plan:   Resume his eliquis 5mg every 12 hrs Daily cbc/bmp  LE Venodyne  :increase mobility as per ortho  Dispo: ? home   will f/u

## 2019-07-12 NOTE — DIETITIAN INITIAL EVALUATION ADULT. - PERTINENT MEDS FT
MEDICATIONS  (STANDING):  apixaban 5 milliGRAM(s) Oral every 12 hours  docusate sodium 100 milliGRAM(s) Oral three times a day  lactated ringers. 1000 milliLiter(s) (75 mL/Hr) IV Continuous <Continuous>  lisinopril 5 milliGRAM(s) Oral daily  metoprolol succinate  milliGRAM(s) Oral daily  pantoprazole    Tablet 40 milliGRAM(s) Oral before breakfast  polyethylene glycol 3350 17 Gram(s) Oral daily    MEDICATIONS  (PRN):  acetaminophen   Tablet .. 650 milliGRAM(s) Oral every 6 hours PRN Temp greater or equal to 38C (100.4F)  aluminum hydroxide/magnesium hydroxide/simethicone Suspension 30 milliLiter(s) Oral four times a day PRN Indigestion  benzocaine 15 mG/menthol 3.6 mG Lozenge 1 Lozenge Oral every 4 hours PRN Sore Throat  HYDROmorphone  Injectable 0.5 milliGRAM(s) IV Push every 4 hours PRN Severe Pain (7 - 10)  ondansetron Injectable 4 milliGRAM(s) IV Push every 6 hours PRN Nausea and/or Vomiting  oxyCODONE    IR 5 milliGRAM(s) Oral every 4 hours PRN Mild Pain (1 - 3)  oxyCODONE    IR 10 milliGRAM(s) Oral every 4 hours PRN Moderate Pain (4 - 6)  senna 2 Tablet(s) Oral at bedtime PRN Constipation

## 2019-07-12 NOTE — CONSULT NOTE ADULT - SUBJECTIVE AND OBJECTIVE BOX
HPI:  77 y/o male with Atrial fib (on eliquis), obesity, OA, HTN, HL s/p Left THR.  Medicine consult requested for post op medical management    2019: No cp, sob, n/v/f/c; left hip is sore, but pain is cotnrolled      PAST MEDICAL & SURGICAL HISTORY:  H/O varicose veins  OA (osteoarthritis) of hip  Hypercholesteremia  HTN (hypertension)  Atrial fibrillation  Legally blind: right eye  Hearing loss  Obesity  BPH (Benign Prostatic Hypertrophy): laser   H/O colonoscopy  H/O cystoscopy: TURP   History of Cataract Surgery: right eye       FAMILY HISTORY:     Family history of melanoma: father      SOCIAL HISTORY:  pos former smoking hx, pos every other cocktail,  no drugs    REVIEW OF SYSTEMS:   All 10 systems reviewed in detailed and found to be negative with the exception of what has already been described above    MEDICATIONS  (STANDING):  apixaban 5 milliGRAM(s) Oral every 12 hours  docusate sodium 100 milliGRAM(s) Oral three times a day  lactated ringers. 1000 milliLiter(s) (75 mL/Hr) IV Continuous <Continuous>  lisinopril 5 milliGRAM(s) Oral daily  metoprolol succinate  milliGRAM(s) Oral daily  pantoprazole    Tablet 40 milliGRAM(s) Oral before breakfast  polyethylene glycol 3350 17 Gram(s) Oral daily    MEDICATIONS  (PRN):  acetaminophen   Tablet .. 650 milliGRAM(s) Oral every 6 hours PRN Temp greater or equal to 38C (100.4F)  aluminum hydroxide/magnesium hydroxide/simethicone Suspension 30 milliLiter(s) Oral four times a day PRN Indigestion  benzocaine 15 mG/menthol 3.6 mG Lozenge 1 Lozenge Oral every 4 hours PRN Sore Throat  HYDROmorphone  Injectable 0.5 milliGRAM(s) IV Push every 4 hours PRN Severe Pain (7 - 10)  ondansetron Injectable 4 milliGRAM(s) IV Push every 6 hours PRN Nausea and/or Vomiting  oxyCODONE    IR 5 milliGRAM(s) Oral every 4 hours PRN Mild Pain (1 - 3)  oxyCODONE    IR 10 milliGRAM(s) Oral every 4 hours PRN Moderate Pain (4 - 6)  senna 2 Tablet(s) Oral at bedtime PRN Constipation      Allergies    No Known Allergies    Intolerances          PHYSICAL EXAM:    Vital Signs Last 24 Hrs  T(C): 36.5 (2019 05:05), Max: 36.6 (2019 14:45)  T(F): 97.7 (2019 05:05), Max: 97.9 (2019 14:45)  HR: 84 (2019 06:33) (60 - 86)  BP: 123/84 (2019 06:33) (104/63 - 176/117)  BP(mean): --  RR: 16 (2019 06:33) (14 - 22)  SpO2: 97% (2019 06:33) (94% - 100%)    GEN: A and O, NAD, mood stable  HEENT:   NC/AT, EOMI, no oropharyngeal lesions    NECK:   supple    CV:  +S1, +S2, IRRegular, POS RUDDY    RESP:   lungs clear to auscultation bilaterally, no wheezing, rales, rhonchi, good air entry bilaterally    GI:  abdomen soft, non-tender, non-distended, normal BS, no abdominal masses, no palpable masses    RECTAL:  not examined    :  not examined    MSK:   normal muscle tone, no atrophy, no rigidity, no contractions    EXT:   no clubbing, no cyanosis, POS LEFT HIP edema, no calf pain, swelling or erythema    VASCULAR:  pulses equal and symmetric in the upper and lower extremities    NEURO:  AAOX3, no focal neurological deficits, follows all commands, able to move extremities spontaneously    SKIN:  no ulcers, lesions or rashes    LABS/IMAGIN.5   5.23  )-----------( 154      ( 2019 15:08 )             41.3     07-11    141  |  108  |  17  ----------------------------<  86  4.3   |  27  |  0.92    Ca    8.5      2019 15:08            PT/INR - ( 2019 10:54 )   PT: 12.1 sec;   INR: 1.09 ratio                                           EKG:  A FIB @79BPM, RAD

## 2019-07-12 NOTE — CONSULT NOTE ADULT - ASSESSMENT
77 Y/O MALE WITH THE ABOVE MED HX S/P LEFT THR    *POSTPROCEDURAL STATE - POD# 1  PAIN CONTROL  INCENTIVE SPIROMETRY  PHYSICAL THERAPY    * A FIB - RATE CONTROL WITH METOPROLOL, CONT ELIQUIS  *htn - ON METOPROLOL AND LISINOPRIL  CONT WITH BP PARAMETERS    *HL - CONT STATIN  *DVT PROPHY - ON ELIQUIS

## 2019-07-12 NOTE — DIETITIAN INITIAL EVALUATION ADULT. - PERTINENT LABORATORY DATA
07-12 Na137 mmol/L Glu 108 mg/dL<H> K+ 4.2 mmol/L Cr  0.88 mg/dL BUN 16 mg/dL Phos n/a   Alb n/a   PAB n/a

## 2019-07-12 NOTE — PROGRESS NOTE ADULT - SUBJECTIVE AND OBJECTIVE BOX
24 Hr Events:  Pt seen and examined. Pain well controlled.  No acute events overnight. No acute complaints.     Vital Signs Last 24 Hrs  T(C): 36.5 (12 Jul 2019 05:05), Max: 36.6 (11 Jul 2019 14:45)  T(F): 97.7 (12 Jul 2019 05:05), Max: 97.9 (11 Jul 2019 14:45)  HR: 82 (12 Jul 2019 05:05) (60 - 86)  BP: 115/66 (12 Jul 2019 05:05) (104/63 - 176/117)  BP(mean): --  RR: 16 (12 Jul 2019 05:05) (14 - 22)  SpO2: 94% (12 Jul 2019 05:05) (94% - 100%)    PE:  Gen: NAD  LLE:   Dressing CDI  Compartments soft and compressible  No calf ttp  Abduction pillow in place  SCDs in place  +EHL/FHL/Gsc/TA  SILT L2-S1  2+ DP

## 2019-07-12 NOTE — PROGRESS NOTE ADULT - ASSESSMENT
A/P:  77 yo M s/p L MACHO POD 1:  -Abduction pillow  -posterior dislocation precautions  -pain control  -WBAT/PT/OT  -dvt ppx in am  -SCDs/Ice/OOB/IS  -FU Labs  -dispo planning

## 2019-07-13 LAB
ANION GAP SERPL CALC-SCNC: 7 MMOL/L — SIGNIFICANT CHANGE UP (ref 5–17)
BUN SERPL-MCNC: 15 MG/DL — SIGNIFICANT CHANGE UP (ref 7–23)
CALCIUM SERPL-MCNC: 8.7 MG/DL — SIGNIFICANT CHANGE UP (ref 8.5–10.1)
CHLORIDE SERPL-SCNC: 104 MMOL/L — SIGNIFICANT CHANGE UP (ref 96–108)
CO2 SERPL-SCNC: 26 MMOL/L — SIGNIFICANT CHANGE UP (ref 22–31)
CREAT SERPL-MCNC: 0.79 MG/DL — SIGNIFICANT CHANGE UP (ref 0.5–1.3)
GLUCOSE SERPL-MCNC: 100 MG/DL — HIGH (ref 70–99)
HCT VFR BLD CALC: 41.3 % — SIGNIFICANT CHANGE UP (ref 39–50)
HGB BLD-MCNC: 13.9 G/DL — SIGNIFICANT CHANGE UP (ref 13–17)
MCHC RBC-ENTMCNC: 32.6 PG — SIGNIFICANT CHANGE UP (ref 27–34)
MCHC RBC-ENTMCNC: 33.7 GM/DL — SIGNIFICANT CHANGE UP (ref 32–36)
MCV RBC AUTO: 96.7 FL — SIGNIFICANT CHANGE UP (ref 80–100)
PLATELET # BLD AUTO: 163 K/UL — SIGNIFICANT CHANGE UP (ref 150–400)
POTASSIUM SERPL-MCNC: 4.1 MMOL/L — SIGNIFICANT CHANGE UP (ref 3.5–5.3)
POTASSIUM SERPL-SCNC: 4.1 MMOL/L — SIGNIFICANT CHANGE UP (ref 3.5–5.3)
RBC # BLD: 4.27 M/UL — SIGNIFICANT CHANGE UP (ref 4.2–5.8)
RBC # FLD: 13.3 % — SIGNIFICANT CHANGE UP (ref 10.3–14.5)
SODIUM SERPL-SCNC: 137 MMOL/L — SIGNIFICANT CHANGE UP (ref 135–145)
WBC # BLD: 9.22 K/UL — SIGNIFICANT CHANGE UP (ref 3.8–10.5)
WBC # FLD AUTO: 9.22 K/UL — SIGNIFICANT CHANGE UP (ref 3.8–10.5)

## 2019-07-13 PROCEDURE — 99232 SBSQ HOSP IP/OBS MODERATE 35: CPT

## 2019-07-13 RX ADMIN — Medication 650 MILLIGRAM(S): at 06:43

## 2019-07-13 RX ADMIN — Medication 650 MILLIGRAM(S): at 21:19

## 2019-07-13 RX ADMIN — Medication 100 MILLIGRAM(S): at 06:11

## 2019-07-13 RX ADMIN — PANTOPRAZOLE SODIUM 40 MILLIGRAM(S): 20 TABLET, DELAYED RELEASE ORAL at 06:11

## 2019-07-13 RX ADMIN — APIXABAN 5 MILLIGRAM(S): 2.5 TABLET, FILM COATED ORAL at 06:11

## 2019-07-13 RX ADMIN — LISINOPRIL 5 MILLIGRAM(S): 2.5 TABLET ORAL at 06:11

## 2019-07-13 RX ADMIN — Medication 100 MILLIGRAM(S): at 13:03

## 2019-07-13 RX ADMIN — Medication 650 MILLIGRAM(S): at 20:49

## 2019-07-13 RX ADMIN — APIXABAN 5 MILLIGRAM(S): 2.5 TABLET, FILM COATED ORAL at 17:43

## 2019-07-13 RX ADMIN — Medication 650 MILLIGRAM(S): at 06:13

## 2019-07-13 NOTE — PROGRESS NOTE ADULT - SUBJECTIVE AND OBJECTIVE BOX
HPI:    Patient is a 78y old  Male who presents with a chief complaint of Left hip arthroplasty (2019 08:30)  pt s/p  left thr on 19.    Consulted by Dr. Don Strickland   for VTE prophylaxis, risk stratification, and anticoagulation management.    PAST MEDICAL & SURGICAL HISTORY:  H/O varicose veins  OA (osteoarthritis) of hip  Hypercholesteremia  HTN (hypertension)  Atrial fibrillation on eliquis  Legally blind: right eye  Hearing loss  Obesity  BPH (Benign Prostatic Hypertrophy): laser   H/O colonoscopy  H/O cystoscopy: TURP   History of Cataract Surgery: right eye 2010    CAPRINI SCORE  AGE RELATED RISK FACTORS                                                       MOBILITY RELATED FACTORS  [ ] Age 41-60 years                                            (1 Point)                  [ ] Bed rest                                                        (1 Point)  [ ] Age: 61-74 years                                           (2 Points)                [ ] Plaster cast                                                   (2 Points)  [ x] Age= 75 years                                              (3 Points)                 [ ] Bed bound for more than 72 hours                   (2 Points)    DISEASE RELATED RISK FACTORS                                               GENDER SPECIFIC FACTORS  [ ] Edema in the lower extremities                       (1 Point)           [ ] Pregnancy                                                            (1 Point)  [ ] Varicose veins                                               (1 Point)                  [ ] Post-partum < 6 weeks                                      (1 Point)             [ x] BMI > 25 Kg/m2                                            (1 Point)                  [ ] Hormonal therapy or oral contraception       (1 Point)                 [ ] Sepsis (in the previous month)                        (1 Point)             [ ] History of pregnancy complications                (1Point)  [ ] Pneumonia or serious lung disease                                             [ ] Unexplained or recurrent  (=/>3), premature                                 (In the previous month)                               (1 Point)                birth with toxemia or growth-restricted infant (1 Point)  [ ] Abnormal pulmonary function test            (1 Point)                                   SURGERY RELATED RISK FACTORS  [ ] Acute myocardial infarction                       (1 Point)                  [ ]  Section                                         (1 Point)  [ ] Congestive heart failure (in the previous month) (1 Point)   [ ] Minor surgery   lasting <45 minutes       (1 Point)   [ ] Inflammatory bowel disease                             (1 Point)          [ ] Arthroscopic surgery                                  (2 Points)  [ ] Central venous access                                    (2 Points)            [ ] General surgery lasting >45 minutes      (2 Points)       [ ] Stroke (in the previous month)                  (5 Points)            [x ] Elective major lower extremity arthroplasty (5 Points)                                   [  ] Malignancy (present or past include skin melanoma                                          but exclude  basal skin cell)    (2 points)                                      TRAUMA RELATED RISK FACTORS                HEMATOLOGY RELATED FACTORS                                  [ ] Fracture of the hip, pelvis, or leg                       (5 Points)  [ ] Prior episodes of VTE                                     (3 Points)          [ ] Acute spinal cord injury (in the previous month)  (5 Points)  [ ] Positive family history for VTE                         (3 Points)       [ ] Paralysis (less than 1 month)                          (5 Points)  [ ] Prothrombin 12528 A                                      (3 Points)         [ ] Multiple Trauma (within 1month)                 (5Points)                                                                                                                                                                [ ] Factor V Leiden                                          (3 Points)                                OTHER RISK FACTORS                          [ ] Lupus anticoagulants                                     (3 Points)                       [ ] BMI > 40                          (1 Point)                                                         [ ] Anticardiolipin antibodies                                (3 Points)                   [ ] Smoking                              (1Point)                                                [ ] High homocysteine in the blood                      (3 Points)                [  ] Diabetes requiring insulin (1point)                         [ ] Other congenital or acquired thrombophilia       (3 Points)          [  ] Chemotherapy                   (1 Point)  [ ] Heparin induced thrombocytopenia                  (3 Points)             [  ] Blood Transfusion                (1 point)                                                                                                             Total Score [8 ]                                                                                                                                                                                                                                     [id number] Score: 3    IMPROVE Bleeding Risk Score: 2.5    Falls Risk:   High ( x )  Mod (  )  Low (  )    EBL: 200  ml  cr cl: 98.4  19 Pt seen at bedside on [facility], with daughters present.  pt in pain scaled 10/10.  RN aware getting pain meds for pt.  Discussed his anticoagualtion with eliquis , which he is on normally for his A. Fib.  Made him aware we will resume it in the a.m due to his late surgical procedure.   19 Pt seen at bedside on orth oob in chair.  Discussed his anticoagulation with Eliquis.  No concerns.  States he wants to go home when discharged   19  Pt seen at bedside on [facility], daughters present. Discussed his anticoagulation with  eliquis.  Pt shas no concerns.  States he is going home tomorrw.    Vital Signs Last 24 Hrs  T(C): 36.8 (19 @ 10:58), Max: 37.3 (19 @ 23:24)  T(F): 98.3 (19 @ 10:58), Max: 99.2 (19 @ 23:24)  HR: 86 (19 @ 10:58) (73 - 88)  BP: 107/59 (19 @ 10:58) (107/59 - 131/84)  BP(mean): 71 (19 @ 10:58) (71 - 71)  RR: 17 (19 @ 10:58) (17 - 18)  SpO2: 94% (19 @ 10:58) (93% - 95%)    FAMILY HISTORY:  Family history of melanoma: father    Denies any personal or familial history of clotting or bleeding disorders.    Allergies    No Known Allergies    Intolerances        REVIEW OF SYSTEMS    (  )Fever	     (  )Constipation	(  )SOB				(  )Headache	(  )Dysuria  (  )Chills	     (  )Melena	(  )Dyspnea present on exertion	                    (  )Dizziness                    (  )Polyuria  (  )Nausea	     (  )Hematochezia	(  )Cough			                    (  )Syncope   	(  )Hematuria  (  )Vomiting    (  )Chest Pain	(  )Wheezing			( x )Weakness   (x) pain left hip  (  )Diarrhea     (  )Palpitations	(  )Anorexia			(x  )Myalgia    Pertinent positives in HPI and daily subjective.  All other ROS negative.    PHYSICAL EXAM:    Constitutional: Appears Well    Neurological: A& O x 3    Skin: Warm    Respiratory and Thorax: normal effort; Breath sounds: normal; No rales/wheezing/rhonchi  	  Cardiovascular: S1, S2, regular, NMBR	    Gastrointestinal: BS + x 4Q, nontender	    Genitourinary:  Bladder nondistended, nontender    Musculoskeletal:   General Right:   no muscle/joint tenderness,   normal tone, no joint swelling,   ROM: full	    General Left:   no muscle/joint tenderness,   normal tone, no joint swelling,   ROM: limitedl    Hip:  left: Dressing CDI; Drain:   Lower extrems:   Right: no calf tenderness              negative joshua's sign               + pedal pulses    Left:   no calf tenderness              negative joshua's sign               + pedal pulses                        13.9   9.22  )-----------( 163      ( 2019 08:14 )             41.3       07-    137  |  104  |  15  ----------------------------<  100<H>  4.1   |  26  |  0.79    Ca    8.7      2019 08:14                                13.6   7.12  )-----------( 182      ( 2019 07:40 )             41.4       07-12    137  |  104  |  16  ----------------------------<  108<H>  4.2   |  28  |  0.88    Ca    8.5      2019 07:40        PT/INR - ( 2019 10:54 )   PT: 12.1 sec;   INR: 1.09 ratio                               13.5   5.23  )-----------( 154      ( 2019 15:08 )             41.3       07-11    141  |  108  |  17  ----------------------------<  86  4.3   |  27  |  0.92    Ca    8.5      2019 15:08        PT/INR - ( 2019 10:54 )   PT: 12.1 sec;   INR: 1.09 ratio         				    MEDICATIONS  (STANDING):  apixaban 5 milliGRAM(s) Oral every 12 hours  docusate sodium 100 milliGRAM(s) Oral three times a day  lactated ringers. 1000 milliLiter(s) IV Continuous <Continuous>  lisinopril 5 milliGRAM(s) Oral daily  metoprolol succinate  milliGRAM(s) Oral daily  pantoprazole    Tablet 40 milliGRAM(s) Oral before breakfast  polyethylene glycol 3350 17 Gram(s) Oral daily          DVT Prophylaxis:  LMWH                   (  )  Heparin SQ           (  )  Coumadin             (  )  Xarelto                  (  )  Eliquis                   ( x )  Venodynes           (x  )  Ambulation          ( x )  UFH                       (  )  Contraindicated  (  )  EC Aspirin             (  )

## 2019-07-13 NOTE — PROGRESS NOTE ADULT - ASSESSMENT
This is a 78 year old male with hx of osteoarthritis.  Now s/p left thr on 7-11-19.  Pt has a hx of A. Fib on eliquis.  [id number] Score: 3.  Pt has high thrombosis riska nd requires anticoagulation treatment dose.    plan:    eliquis 5mg every 12 hrs Daily cbc/bmp  LE Venodyne  :increase mobility as per ortho  Dispo:  home   will f/u

## 2019-07-13 NOTE — PROGRESS NOTE ADULT - SUBJECTIVE AND OBJECTIVE BOX
Pt S/E at bedside, no acute events overnight, pain controlled    Vital Signs Last 24 Hrs  T(C): 36.8 (13 Jul 2019 05:57), Max: 37.3 (12 Jul 2019 23:24)  T(F): 98.3 (13 Jul 2019 05:57), Max: 99.2 (12 Jul 2019 23:24)  HR: 88 (13 Jul 2019 05:57) (73 - 90)  BP: 118/69 (13 Jul 2019 05:57) (109/66 - 131/84)  BP(mean): --  RR: 18 (13 Jul 2019 05:57) (18 - 18)  SpO2: 95% (13 Jul 2019 05:57) (93% - 100%)    Gen: NAD    Left Lower Extremity:  Dressing clean dry intact  +EHL/FHL/TA/GS  SILT L3-S1  +DP/PT Pulses  Compartments soft  No calf TTP B/L

## 2019-07-13 NOTE — PROGRESS NOTE ADULT - SUBJECTIVE AND OBJECTIVE BOX
HPI:  79 y/o male with Atrial fib (on eliquis), obesity, OA, HTN, HL s/p Left THR.  Medicine consult requested for post op medical management    2019: No cp, sob, n/v/f/c; left hip is sore, but pain is cotnrolled  19: No cp, sob, n/v/f/c; left hip feels ok -- was confused overnight and called 911 per rn; wife and daughter at bedside -- report his drinking is 2-4 drinks nightly.      REVIEW OF SYSTEMS:   All 10 systems reviewed in detailed and found to be negative with the exception of what has already been described above      PHYSICAL EXAM:    Vital Signs Last 24 Hrs  T(C): 36.8 (2019 05:57), Max: 37.3 (2019 23:24)  T(F): 98.3 (2019 05:57), Max: 99.2 (2019 23:24)  HR: 88 (2019 05:57) (73 - 90)  BP: 118/69 (2019 05:57) (109/66 - 131/84)  BP(mean): --  RR: 18 (2019 05:57) (18 - 18)  SpO2: 95% (2019 05:57) (93% - 100%)    GEN: A and O, NAD, mood stable, slightly confused, no tremors  HEENT:   NC/AT, EOMI, no oropharyngeal lesions    NECK:   supple    CV:  +S1, +S2, IRRegular, POS RUDDY    RESP:   lungs clear to auscultation bilaterally, no wheezing, rales, rhonchi, good air entry bilaterally    GI:  abdomen soft, non-tender, non-distended, normal BS, no abdominal masses, no palpable masses    RECTAL:  not examined    :  not examined    MSK:   normal muscle tone, no atrophy, no rigidity, no contractions    EXT:   no clubbing, no cyanosis, POS LEFT HIP edema, no calf pain, swelling or erythema    VASCULAR:  pulses equal and symmetric in the upper and lower extremities    NEURO:  AAOX3, no focal neurological deficits, follows all commands, able to move extremities spontaneously    SKIN:  no ulcers, lesions or rashes    LABS/IMAGIN.6   7.12  )-----------( 182      ( 2019 07:40 )             41.4     07-12    137  |  104  |  16  ----------------------------<  108<H>  4.2   |  28  |  0.88    Ca    8.5      2019 07:40            PT/INR - ( 2019 10:54 )   PT: 12.1 sec;   INR: 1.09 ratio       MEDICATIONS  (STANDING):  apixaban 5 milliGRAM(s) Oral every 12 hours  docusate sodium 100 milliGRAM(s) Oral three times a day  lactated ringers. 1000 milliLiter(s) (75 mL/Hr) IV Continuous <Continuous>  lisinopril 5 milliGRAM(s) Oral daily  metoprolol succinate  milliGRAM(s) Oral daily  pantoprazole    Tablet 40 milliGRAM(s) Oral before breakfast  polyethylene glycol 3350 17 Gram(s) Oral daily    MEDICATIONS  (PRN):  acetaminophen   Tablet .. 650 milliGRAM(s) Oral every 6 hours PRN Temp greater or equal to 38C (100.4F)  aluminum hydroxide/magnesium hydroxide/simethicone Suspension 30 milliLiter(s) Oral four times a day PRN Indigestion  benzocaine 15 mG/menthol 3.6 mG Lozenge 1 Lozenge Oral every 4 hours PRN Sore Throat  bisacodyl Suppository 10 milliGRAM(s) Rectal daily PRN If no bowel movement by POD#2  HYDROmorphone  Injectable 0.5 milliGRAM(s) IV Push every 4 hours PRN Severe Pain (7 - 10)  ondansetron Injectable 4 milliGRAM(s) IV Push every 6 hours PRN Nausea and/or Vomiting  oxyCODONE    IR 5 milliGRAM(s) Oral every 4 hours PRN Mild Pain (1 - 3)  oxyCODONE    IR 10 milliGRAM(s) Oral every 4 hours PRN Moderate Pain (4 - 6)  senna 2 Tablet(s) Oral at bedtime PRN Constipation

## 2019-07-13 NOTE — PROGRESS NOTE ADULT - ASSESSMENT
79 Y/O MALE WITH THE ABOVE MED HX S/P LEFT THR    *POSTPROCEDURAL STATE - POD# 2  PAIN CONTROL  INCENTIVE SPIROMETRY  PHYSICAL THERAPY  *hx of alcohol use -- will be at risk for impending withdrawal  possibly his confusion  he is at 72 hours from his last drink  no tremors currently  will place on ciwa or ativan po to prevent dts  DISCUSSED WITH WIFE AND DTR AT BEDSIDE  * A FIB - RATE CONTROL WITH METOPROLOL, CONT ELIQUIS  *htn - ON METOPROLOL AND LISINOPRIL  CONT WITH BP PARAMETERS    *HL - CONT STATIN  *DVT PROPHY - ON ELIQUIS

## 2019-07-14 ENCOUNTER — TRANSCRIPTION ENCOUNTER (OUTPATIENT)
Age: 79
End: 2019-07-14

## 2019-07-14 VITALS — HEART RATE: 88 BPM | DIASTOLIC BLOOD PRESSURE: 62 MMHG | SYSTOLIC BLOOD PRESSURE: 103 MMHG

## 2019-07-14 LAB
ANION GAP SERPL CALC-SCNC: 6 MMOL/L — SIGNIFICANT CHANGE UP (ref 5–17)
BUN SERPL-MCNC: 18 MG/DL — SIGNIFICANT CHANGE UP (ref 7–23)
CALCIUM SERPL-MCNC: 9 MG/DL — SIGNIFICANT CHANGE UP (ref 8.5–10.1)
CHLORIDE SERPL-SCNC: 106 MMOL/L — SIGNIFICANT CHANGE UP (ref 96–108)
CO2 SERPL-SCNC: 27 MMOL/L — SIGNIFICANT CHANGE UP (ref 22–31)
CREAT SERPL-MCNC: 0.63 MG/DL — SIGNIFICANT CHANGE UP (ref 0.5–1.3)
GLUCOSE SERPL-MCNC: 90 MG/DL — SIGNIFICANT CHANGE UP (ref 70–99)
HCT VFR BLD CALC: 40.4 % — SIGNIFICANT CHANGE UP (ref 39–50)
HGB BLD-MCNC: 13.6 G/DL — SIGNIFICANT CHANGE UP (ref 13–17)
MCHC RBC-ENTMCNC: 32.6 PG — SIGNIFICANT CHANGE UP (ref 27–34)
MCHC RBC-ENTMCNC: 33.7 GM/DL — SIGNIFICANT CHANGE UP (ref 32–36)
MCV RBC AUTO: 96.9 FL — SIGNIFICANT CHANGE UP (ref 80–100)
PLATELET # BLD AUTO: 157 K/UL — SIGNIFICANT CHANGE UP (ref 150–400)
POTASSIUM SERPL-MCNC: 3.8 MMOL/L — SIGNIFICANT CHANGE UP (ref 3.5–5.3)
POTASSIUM SERPL-SCNC: 3.8 MMOL/L — SIGNIFICANT CHANGE UP (ref 3.5–5.3)
RBC # BLD: 4.17 M/UL — LOW (ref 4.2–5.8)
RBC # FLD: 13.5 % — SIGNIFICANT CHANGE UP (ref 10.3–14.5)
SODIUM SERPL-SCNC: 139 MMOL/L — SIGNIFICANT CHANGE UP (ref 135–145)
WBC # BLD: 8.29 K/UL — SIGNIFICANT CHANGE UP (ref 3.8–10.5)
WBC # FLD AUTO: 8.29 K/UL — SIGNIFICANT CHANGE UP (ref 3.8–10.5)

## 2019-07-14 PROCEDURE — 99232 SBSQ HOSP IP/OBS MODERATE 35: CPT

## 2019-07-14 RX ORDER — SODIUM CHLORIDE 9 MG/ML
1000 INJECTION INTRAMUSCULAR; INTRAVENOUS; SUBCUTANEOUS ONCE
Refills: 0 | Status: COMPLETED | OUTPATIENT
Start: 2019-07-14 | End: 2019-07-14

## 2019-07-14 RX ORDER — FERROUS SULFATE 325(65) MG
1 TABLET ORAL
Qty: 0 | Refills: 0 | DISCHARGE

## 2019-07-14 RX ADMIN — Medication 650 MILLIGRAM(S): at 05:56

## 2019-07-14 RX ADMIN — OXYCODONE HYDROCHLORIDE 5 MILLIGRAM(S): 5 TABLET ORAL at 01:52

## 2019-07-14 RX ADMIN — APIXABAN 5 MILLIGRAM(S): 2.5 TABLET, FILM COATED ORAL at 05:55

## 2019-07-14 RX ADMIN — Medication 100 MILLIGRAM(S): at 05:55

## 2019-07-14 RX ADMIN — SODIUM CHLORIDE 1000 MILLILITER(S): 9 INJECTION INTRAMUSCULAR; INTRAVENOUS; SUBCUTANEOUS at 12:04

## 2019-07-14 RX ADMIN — PANTOPRAZOLE SODIUM 40 MILLIGRAM(S): 20 TABLET, DELAYED RELEASE ORAL at 05:55

## 2019-07-14 RX ADMIN — LISINOPRIL 5 MILLIGRAM(S): 2.5 TABLET ORAL at 05:55

## 2019-07-14 RX ADMIN — Medication 650 MILLIGRAM(S): at 06:26

## 2019-07-14 RX ADMIN — Medication 650 MILLIGRAM(S): at 15:14

## 2019-07-14 RX ADMIN — OXYCODONE HYDROCHLORIDE 5 MILLIGRAM(S): 5 TABLET ORAL at 01:22

## 2019-07-14 NOTE — DISCHARGE NOTE NURSING/CASE MANAGEMENT/SOCIAL WORK - NSDCDPATPORTLINK_GEN_ALL_CORE
You can access the [facility], offered by [facility], by registering with the following website: [email or web address]

## 2019-07-14 NOTE — PROGRESS NOTE ADULT - ASSESSMENT
This is a 78 year old male with hx of osteoarthritis.  Now s/p left thr on 7-11-19.  Pt has a hx of A. Fib on eliquis.  [id number] Score: 3.  Pt has high thrombosis riska nd requires anticoagulation treatment dose.    plan:    eliquis 5mg every 12 hrs Daily cbc/bmp  LE Venodyne  reat cbc as home draw this week.  :increase mobility as per ortho  Dispo:  home today   will f/u

## 2019-07-14 NOTE — PROGRESS NOTE ADULT - SUBJECTIVE AND OBJECTIVE BOX
HPI:    Patient is a 78y old  Male who presents with a chief complaint of Left hip arthroplasty (2019 08:30)  pt s/p  left thr on 19.    Consulted by Dr. Don Strickland   for VTE prophylaxis, risk stratification, and anticoagulation management.    PAST MEDICAL & SURGICAL HISTORY:  H/O varicose veins  OA (osteoarthritis) of hip  Hypercholesteremia  HTN (hypertension)  Atrial fibrillation on eliquis  Legally blind: right eye  Hearing loss  Obesity  BPH (Benign Prostatic Hypertrophy): laser   H/O colonoscopy  H/O cystoscopy: TURP   History of Cataract Surgery: right eye 2010    CAPRINI SCORE  AGE RELATED RISK FACTORS                                                       MOBILITY RELATED FACTORS  [ ] Age 41-60 years                                            (1 Point)                  [ ] Bed rest                                                        (1 Point)  [ ] Age: 61-74 years                                           (2 Points)                [ ] Plaster cast                                                   (2 Points)  [ x] Age= 75 years                                              (3 Points)                 [ ] Bed bound for more than 72 hours                   (2 Points)    DISEASE RELATED RISK FACTORS                                               GENDER SPECIFIC FACTORS  [ ] Edema in the lower extremities                       (1 Point)           [ ] Pregnancy                                                            (1 Point)  [ ] Varicose veins                                               (1 Point)                  [ ] Post-partum < 6 weeks                                      (1 Point)             [ x] BMI > 25 Kg/m2                                            (1 Point)                  [ ] Hormonal therapy or oral contraception       (1 Point)                 [ ] Sepsis (in the previous month)                        (1 Point)             [ ] History of pregnancy complications                (1Point)  [ ] Pneumonia or serious lung disease                                             [ ] Unexplained or recurrent  (=/>3), premature                                 (In the previous month)                               (1 Point)                birth with toxemia or growth-restricted infant (1 Point)  [ ] Abnormal pulmonary function test            (1 Point)                                   SURGERY RELATED RISK FACTORS  [ ] Acute myocardial infarction                       (1 Point)                  [ ]  Section                                         (1 Point)  [ ] Congestive heart failure (in the previous month) (1 Point)   [ ] Minor surgery   lasting <45 minutes       (1 Point)   [ ] Inflammatory bowel disease                             (1 Point)          [ ] Arthroscopic surgery                                  (2 Points)  [ ] Central venous access                                    (2 Points)            [ ] General surgery lasting >45 minutes      (2 Points)       [ ] Stroke (in the previous month)                  (5 Points)            [x ] Elective major lower extremity arthroplasty (5 Points)                                   [  ] Malignancy (present or past include skin melanoma                                          but exclude  basal skin cell)    (2 points)                                      TRAUMA RELATED RISK FACTORS                HEMATOLOGY RELATED FACTORS                                  [ ] Fracture of the hip, pelvis, or leg                       (5 Points)  [ ] Prior episodes of VTE                                     (3 Points)          [ ] Acute spinal cord injury (in the previous month)  (5 Points)  [ ] Positive family history for VTE                         (3 Points)       [ ] Paralysis (less than 1 month)                          (5 Points)  [ ] Prothrombin 21447 A                                      (3 Points)         [ ] Multiple Trauma (within 1month)                 (5Points)                                                                                                                                                                [ ] Factor V Leiden                                          (3 Points)                                OTHER RISK FACTORS                          [ ] Lupus anticoagulants                                     (3 Points)                       [ ] BMI > 40                          (1 Point)                                                         [ ] Anticardiolipin antibodies                                (3 Points)                   [ ] Smoking                              (1Point)                                                [ ] High homocysteine in the blood                      (3 Points)                [  ] Diabetes requiring insulin (1point)                         [ ] Other congenital or acquired thrombophilia       (3 Points)          [  ] Chemotherapy                   (1 Point)  [ ] Heparin induced thrombocytopenia                  (3 Points)             [  ] Blood Transfusion                (1 point)                                                                                                             Total Score [8 ]                                                                                                                                                                                                                                     [id number] Score: 3    IMPROVE Bleeding Risk Score: 2.5    Falls Risk:   High ( x )  Mod (  )  Low (  )    EBL: 200  ml  cr cl: 98.4  19 Pt seen at bedside on [facility], with daughters present.  pt in pain scaled 10/10.  RN aware getting pain meds for pt.  Discussed his anticoagualtion with eliquis , which he is on normally for his A. Fib.  Made him aware we will resume it in the a.m due to his late surgical procedure.   19 Pt seen at bedside on [facility] oob in chair.  Discussed his anticoagulation with Eliquis.  No concerns.  States he wants to go home when discharged   19  Pt seen at bedside on [facility], daughters present. Discussed his anticoagulation with  eliquis.  Pt shas no concerns.  States he is going home tomorrw.  19 Pt seen at bedside on [facility] daughter present.  pt states he is going home today getting some fluid due to decrease b/p Discussed his anticoagulation no cncerns.     Vital Signs Last 24 Hrs  T(C): 36.9 (19 @ 11:14), Max: 36.9 (19 @ 23:42)  T(F): 98.4 (19 @ 11:14), Max: 98.4 (19 @ 23:42)  HR: 81 (19 @ 11:14) (77 - 94)  BP: 72/50 (19 @ 11:14) (72/50 - 135/74)  BP(mean): --  RR: 18 (19 @ 11:14) (17 - 18)  SpO2: 95% (19 @ 11:14) (95% - 96%)    FAMILY HISTORY:  Family history of melanoma: father    Denies any personal or familial history of clotting or bleeding disorders.    Allergies    No Known Allergies    Intolerances        REVIEW OF SYSTEMS    (  )Fever	     (  )Constipation	(  )SOB				(  )Headache	(  )Dysuria  (  )Chills	     (  )Melena	(  )Dyspnea present on exertion	                    (  )Dizziness                    (  )Polyuria  (  )Nausea	     (  )Hematochezia	(  )Cough			                    (  )Syncope   	(  )Hematuria  (  )Vomiting    (  )Chest Pain	(  )Wheezing			( x )Weakness   (x) pain left hip  (  )Diarrhea     (  )Palpitations	(  )Anorexia			(x  )Myalgia    Pertinent positives in HPI and daily subjective.  All other ROS negative.    PHYSICAL EXAM:    Constitutional: Appears Well    Neurological: A& O x 3    Skin: Warm    Respiratory and Thorax: normal effort; Breath sounds: normal; No rales/wheezing/rhonchi  	  Cardiovascular: S1, S2, regular, NMBR	    Gastrointestinal: BS + x 4Q, nontender	    Genitourinary:  Bladder nondistended, nontender    Musculoskeletal:   General Right:   no muscle/joint tenderness,   normal tone, no joint swelling,   ROM: full	    General Left:   no muscle/joint tenderness,   normal tone, no joint swelling,   ROM: limitedl    Hip:  left: Dressing CDI; Drain:   Lower extrems:   Right: no calf tenderness              negative joshua's sign               + pedal pulses    Left:   no calf tenderness              negative joshua's sign               + pedal pulses                          13.6   8.29  )-----------( 157      ( 2019 06:53 )             40.4       07-14    139  |  106  |  18  ----------------------------<  90  3.8   |  27  |  0.63    Ca    9.0      2019 06:53                            13.9   9.22  )-----------( 163      ( 2019 08:14 )             41.3       07-13    137  |  104  |  15  ----------------------------<  100<H>  4.1   |  26  |  0.79    Ca    8.7      2019 08:14                                13.6   7.12  )-----------( 182      ( 2019 07:40 )             41.4       07-12    137  |  104  |  16  ----------------------------<  108<H>  4.2   |  28  |  0.88    Ca    8.5      2019 07:40        PT/INR - ( 2019 10:54 )   PT: 12.1 sec;   INR: 1.09 ratio                               13.5   5.23  )-----------( 154      ( 2019 15:08 )             41.3       07-11    141  |  108  |  17  ----------------------------<  86  4.3   |  27  |  0.92    Ca    8.5      2019 15:08        PT/INR - ( 2019 10:54 )   PT: 12.1 sec;   INR: 1.09 ratio         				    MEDICATIONS  (STANDING):  apixaban 5 milliGRAM(s) Oral every 12 hours  docusate sodium 100 milliGRAM(s) Oral three times a day  lactated ringers. 1000 milliLiter(s) IV Continuous <Continuous>  lisinopril 5 milliGRAM(s) Oral daily  metoprolol succinate  milliGRAM(s) Oral daily  pantoprazole    Tablet 40 milliGRAM(s) Oral before breakfast  polyethylene glycol 3350 17 Gram(s) Oral daily            DVT Prophylaxis:  LMWH                   (  )  Heparin SQ           (  )  Coumadin             (  )  Xarelto                  (  )  Eliquis                   ( x )  Venodynes           (x  )  Ambulation          ( x )  UFH                       (  )  Contraindicated  (  )  EC Aspirin             (  )

## 2019-07-14 NOTE — PROGRESS NOTE ADULT - SUBJECTIVE AND OBJECTIVE BOX
Pt S/E at bedside, no acute events overnight, pain controlled    Vital Signs Last 24 Hrs  T(C): 36.8 (14 Jul 2019 05:01), Max: 36.9 (13 Jul 2019 23:42)  T(F): 98.2 (14 Jul 2019 05:01), Max: 98.4 (13 Jul 2019 23:42)  HR: 77 (14 Jul 2019 05:01) (77 - 94)  BP: 122/65 (14 Jul 2019 05:01) (107/59 - 135/74)  BP(mean): 71 (13 Jul 2019 10:58) (71 - 71)  RR: 18 (14 Jul 2019 05:01) (17 - 18)  SpO2: 96% (14 Jul 2019 05:01) (94% - 96%)    Gen: NAD    Left Lower Extremity:  Dressing clean dry intact  +EHL/FHL/TA/GS  SILT L3-S1  +DP/PT Pulses  Compartments soft  No calf TTP B/L

## 2019-07-15 ENCOUNTER — EMERGENCY (EMERGENCY)
Facility: HOSPITAL | Age: 79
LOS: 0 days | Discharge: HOME | End: 2019-07-15
Attending: EMERGENCY MEDICINE | Admitting: EMERGENCY MEDICINE
Payer: MEDICARE

## 2019-07-15 VITALS
OXYGEN SATURATION: 96 % | WEIGHT: 238.1 LBS | DIASTOLIC BLOOD PRESSURE: 71 MMHG | HEIGHT: 72 IN | TEMPERATURE: 98 F | HEART RATE: 99 BPM | SYSTOLIC BLOOD PRESSURE: 122 MMHG | RESPIRATION RATE: 18 BRPM

## 2019-07-15 VITALS
RESPIRATION RATE: 18 BRPM | HEART RATE: 97 BPM | DIASTOLIC BLOOD PRESSURE: 88 MMHG | TEMPERATURE: 98 F | SYSTOLIC BLOOD PRESSURE: 119 MMHG | OXYGEN SATURATION: 96 %

## 2019-07-15 DIAGNOSIS — H54.8 LEGAL BLINDNESS, AS DEFINED IN USA: ICD-10-CM

## 2019-07-15 DIAGNOSIS — I83.90 ASYMPTOMATIC VARICOSE VEINS OF UNSPECIFIED LOWER EXTREMITY: ICD-10-CM
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
## 2019-07-15 DIAGNOSIS — Z98.890 OTHER SPECIFIED POSTPROCEDURAL STATES: Chronic | ICD-10-CM

## 2019-07-15 DIAGNOSIS — R05 COUGH: ICD-10-CM

## 2019-07-15 DIAGNOSIS — M25.552 PAIN IN LEFT HIP: ICD-10-CM

## 2019-07-15 DIAGNOSIS — Z96.642 PRESENCE OF LEFT ARTIFICIAL HIP JOINT: ICD-10-CM

## 2019-07-15 DIAGNOSIS — E66.9 OBESITY, UNSPECIFIED: ICD-10-CM

## 2019-07-15 DIAGNOSIS — I10 ESSENTIAL (PRIMARY) HYPERTENSION: ICD-10-CM

## 2019-07-15 DIAGNOSIS — I48.91 UNSPECIFIED ATRIAL FIBRILLATION: ICD-10-CM

## 2019-07-15 DIAGNOSIS — Z79.899 OTHER LONG TERM (CURRENT) DRUG THERAPY: ICD-10-CM

## 2019-07-15 DIAGNOSIS — E78.00 PURE HYPERCHOLESTEROLEMIA, UNSPECIFIED: ICD-10-CM

## 2019-07-15 DIAGNOSIS — M19.90 UNSPECIFIED OSTEOARTHRITIS, UNSPECIFIED SITE: ICD-10-CM

## 2019-07-15 DIAGNOSIS — T84.84XA PAIN DUE TO INTERNAL ORTHOPEDIC PROSTHETIC DEVICES, IMPLANTS AND GRAFTS, INITIAL ENCOUNTER: ICD-10-CM
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
## 2019-07-15 DIAGNOSIS — R42 DIZZINESS AND GIDDINESS: ICD-10-CM

## 2019-07-15 PROBLEM — M16.9 OSTEOARTHRITIS OF HIP, UNSPECIFIED: Chronic | Status: ACTIVE | Noted: 2019-06-26

## 2019-07-15 PROBLEM — Z86.79 PERSONAL HISTORY OF OTHER DISEASES OF THE CIRCULATORY SYSTEM: Chronic | Status: ACTIVE | Noted: 2019-06-26

## 2019-07-15 PROBLEM — H91.90 UNSPECIFIED HEARING LOSS, UNSPECIFIED EAR: Chronic | Status: ACTIVE | Noted: 2019-06-26

## 2019-07-15 LAB — SURGICAL PATHOLOGY STUDY: SIGNIFICANT CHANGE UP

## 2019-07-15 PROCEDURE — 99284 EMERGENCY DEPT VISIT MOD MDM: CPT

## 2019-07-15 PROCEDURE — 71045 X-RAY EXAM CHEST 1 VIEW: CPT | Mod: 26

## 2019-07-15 RX ADMIN — Medication 100 MILLIGRAM(S): at 11:39

## 2019-07-15 NOTE — ED ADULT TRIAGE NOTE - CHIEF COMPLAINT QUOTE
Pt BIBEMS for evaluation of left hip pain, discharged from  yesterday. Upon standing pain increased and patient felt dizzy. EMS established IV and administered Fentanyl 50 mcg IVP and 250 cc NS Bolus PTA.

## 2019-07-15 NOTE — ED PROVIDER NOTE - OBJECTIVE STATEMENT
79 y/o male with a PMHx of AFib, BPH, h/o varicose veins, HTN, hypercholesteremia, OA, obesity presents to the ED c/o left hip pain s/p total hip replacement by Dr. Strickland. Pt D/C'd yesterday to home. While in hospital pt received physical therapy. Pt lives alone and daughter is unable to control pt's pain on her own. Pt has been taking oxycodone and Tylenol for pain without relief in sx's. EMS established IV and administered Fentanyl 50 mcg IVP and 250 cc NS Bolus PTA. Pain right now is manageable. Also c/o cough that has been persistent and making it hard to sleep. PMD: Dr. Taylor

## 2019-07-15 NOTE — ED PROVIDER NOTE - PMH
Atrial fibrillation    BPH (Benign Prostatic Hypertrophy)  laser 2010  H/O varicose veins    Hearing loss    HTN (hypertension)    Hypercholesteremia    Legally blind  right eye  OA (osteoarthritis) of hip    Obesity

## 2019-07-15 NOTE — ED ADULT NURSE NOTE - OBJECTIVE STATEMENT
pt pw c/o left hip s/p left hip replacement done at  by Dr. Strickland on 7/11/19. Pt states he can not lay on his back for too long,, my saliva collects in the back of my throat and it feels like I'm drowning.  he has not been able to sleep because of the pain. Pt believes he should have been released into a subacute rehab in order to facilitate healing. Pt presents to ED without wedge pillow in place  Dressing on left hip is dry, clean and intact.

## 2019-07-15 NOTE — ED CLERICAL - NS ED CLERK NOTE PRE-ARRIVAL INFORMATION; ADDITIONAL PRE-ARRIVAL INFORMATION
This patient is enrolled in the comprehensive joint replacement (CJR) program and has active care navigation.  This patient can be followed up by the care navigation team within 24 hours. To arrange close follow-up or to obtain additional clinical information about this patient, please call the contact number above. Please call the hospitalist for medical consultation ([phone]) PRIOR to admission or observation.  The hospitalist is part of the care team and can assist in acute medical management. Please call the orthopedic team () for ALL patients who require admission.

## 2019-07-15 NOTE — ED PROVIDER NOTE - CLINICAL SUMMARY MEDICAL DECISION MAKING FREE TEXT BOX
CXR was clear.  Tessalon given for cough with improvement.  Pt was set up for rehab at [facility].  Discussed with Dr. Barney.

## 2019-07-18 DIAGNOSIS — M16.12 UNILATERAL PRIMARY OSTEOARTHRITIS, LEFT HIP: ICD-10-CM

## 2019-07-18 DIAGNOSIS — I10 ESSENTIAL (PRIMARY) HYPERTENSION: ICD-10-CM

## 2019-07-18 DIAGNOSIS — I48.91 UNSPECIFIED ATRIAL FIBRILLATION: ICD-10-CM

## 2019-07-18 DIAGNOSIS — N40.0 BENIGN PROSTATIC HYPERPLASIA WITHOUT LOWER URINARY TRACT SYMPTOMS: ICD-10-CM

## 2019-07-18 DIAGNOSIS — E78.5 HYPERLIPIDEMIA, UNSPECIFIED: ICD-10-CM

## 2019-07-18 DIAGNOSIS — E78.00 PURE HYPERCHOLESTEROLEMIA, UNSPECIFIED: ICD-10-CM

## 2019-07-18 DIAGNOSIS — Z72.89 OTHER PROBLEMS RELATED TO LIFESTYLE: ICD-10-CM

## 2021-01-12 ENCOUNTER — EMERGENCY (EMERGENCY)
Facility: HOSPITAL | Age: 81
LOS: 0 days | Discharge: HOME | End: 2021-01-12
Attending: EMERGENCY MEDICINE
Payer: MEDICARE

## 2021-01-12 VITALS
DIASTOLIC BLOOD PRESSURE: 92 MMHG | TEMPERATURE: 98 F | SYSTOLIC BLOOD PRESSURE: 151 MMHG | OXYGEN SATURATION: 99 % | RESPIRATION RATE: 16 BRPM | HEART RATE: 85 BPM | WEIGHT: 244.93 LBS | HEIGHT: 72 IN

## 2021-01-12 DIAGNOSIS — E78.00 PURE HYPERCHOLESTEROLEMIA, UNSPECIFIED: ICD-10-CM

## 2021-01-12 DIAGNOSIS — Z23 ENCOUNTER FOR IMMUNIZATION: ICD-10-CM
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
## 2021-01-12 DIAGNOSIS — R60.0 LOCALIZED EDEMA: ICD-10-CM

## 2021-01-12 DIAGNOSIS — Z98.890 OTHER SPECIFIED POSTPROCEDURAL STATES: Chronic | ICD-10-CM

## 2021-01-12 DIAGNOSIS — I10 ESSENTIAL (PRIMARY) HYPERTENSION: ICD-10-CM

## 2021-01-12 DIAGNOSIS — I48.91 UNSPECIFIED ATRIAL FIBRILLATION: ICD-10-CM
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
## 2021-01-12 DIAGNOSIS — S81.811A LACERATION WITHOUT FOREIGN BODY, RIGHT LOWER LEG, INITIAL ENCOUNTER: ICD-10-CM

## 2021-01-12 DIAGNOSIS — Z79.01 LONG TERM (CURRENT) USE OF ANTICOAGULANTS: ICD-10-CM

## 2021-01-12 DIAGNOSIS — M16.31 UNILATERAL OSTEOARTHRITIS RESULTING FROM HIP DYSPLASIA, RIGHT HIP: ICD-10-CM

## 2021-01-12 DIAGNOSIS — E66.9 OBESITY, UNSPECIFIED: ICD-10-CM

## 2021-01-12 PROCEDURE — 99283 EMERGENCY DEPT VISIT LOW MDM: CPT | Mod: 25

## 2021-01-12 PROCEDURE — 90471 IMMUNIZATION ADMIN: CPT

## 2021-01-12 PROCEDURE — 99283 EMERGENCY DEPT VISIT LOW MDM: CPT

## 2021-01-12 PROCEDURE — 90715 TDAP VACCINE 7 YRS/> IM: CPT

## 2021-01-12 RX ORDER — TETANUS TOXOID, REDUCED DIPHTHERIA TOXOID AND ACELLULAR PERTUSSIS VACCINE, ADSORBED 5; 2.5; 8; 8; 2.5 [IU]/.5ML; [IU]/.5ML; UG/.5ML; UG/.5ML; UG/.5ML
0.5 SUSPENSION INTRAMUSCULAR ONCE
Refills: 0 | Status: COMPLETED | OUTPATIENT
Start: 2021-01-12 | End: 2021-01-12

## 2021-01-12 RX ADMIN — TETANUS TOXOID, REDUCED DIPHTHERIA TOXOID AND ACELLULAR PERTUSSIS VACCINE, ADSORBED 0.5 MILLILITER(S): 5; 2.5; 8; 8; 2.5 SUSPENSION INTRAMUSCULAR at 16:41

## 2021-01-12 NOTE — ED STATDOCS - CLINICAL SUMMARY MEDICAL DECISION MAKING FREE TEXT BOX
79 y/o male on Eliquis presenting with small laceration to right leg, not currently bleeding. PLAN: Wound care.

## 2021-01-12 NOTE — ED STATDOCS - PATIENT PORTAL LINK FT
You can access the FollowMyHealth Patient Portal offered by [facility] by registering at the following website: [email or web address]. By joining [facility]’s FollowMyHealth portal, you will also be able to view your health information using other applications (apps) compatible with our system.

## 2021-01-12 NOTE — ED ADULT NURSE NOTE - OBJECTIVE STATEMENT
pt came to ED for treatment of right lower leg bleeding. pt sts suspected varicose vein cut. skin tear noted on lower right leg. denies any other pain or complaints

## 2021-01-12 NOTE — ED STATDOCS - SKIN, MLM
+purple-eris color to right toe, cap refill <2 seconds. Small laceration of anterior foot, 2mm in size, not actively bleeding at this time. +volatious color to right foot,  cap refill <2 seconds. Small laceration of anterior lower tibia 2mm in size, not actively bleeding at this time.

## 2021-01-12 NOTE — ED STATDOCS - CARE PLAN
Principal Discharge DX:	Skin tear of left lower leg without complication, initial encounter  Secondary Diagnosis:	Skin avulsion  Secondary Diagnosis:	Bilateral lower extremity edema

## 2021-01-12 NOTE — ED ADULT TRIAGE NOTE - WEIGHT METHOD
44 yo male, PMH morbid obesity, BMI 58.6, DM2, (HgA1c 12.1, previously 11.3 in November - pt. on dexamethasone), glioblastoma multiforme, diagnosed 8/2019 s/p resection and chemo-RT, post op DVT was on Lovenox and presently on aspirin, necrotizing fascitis on left leg and discharged from [facility] on 2/4/20. Pt.'s most recent MRI brain reveals progression of multifocal GBM s/p  Left craniotomy for resection of recurrent Left frontal GBM 2/27/20.    Plan    -Neuro stable. Decadron tapered to 2mg q8. On Keppra 1G BID. Should make Neuroonc appt prior to discharge   Vitals stable  -Type 2 DM- Followed by internal medicine- NPH and lantus increased today. Endocrine team consulted today.  -Hyponatremia- 2% nacl d/c'd today. Follow-up BMP off of hypertonics shows sodium of 136 when corrected for hyperglycemia. BMP q8 . c/w 1L fluid restriction and will start salt tabs.   - Left thigh wound from history of necrotising fascitis -Wound care team following  - SQL and SCDs for DVT ppx   PT/OT- a/c rehab . PM&R- [facility].     -will discuss with Dr. Salcedo  Spectra #[id number] stated

## 2021-01-12 NOTE — ED ADULT NURSE NOTE - CAS ELECT INFOMATION PROVIDED
Assessment/Plan:     Diagnoses and all orders for this visit:    Gastroesophageal reflux disease without esophagitis  She continues with intermittent reflux symptoms  We did discuss the GERD handout and dietary modifications-she does not wish to take a PPI at this time  He also recommended continuing with Zantac on an as-needed basis  Hopefully with dietary modification her symptoms will continue to improve  Dysphagia, unspecified type  I feel her dysphagia is probably likely related to her reflux as her EGD and manometry were within normal limits minus some gastric erosions  Again with dietary modifications hopefully her symptoms will improve  We will see her back on an as needed basis and she will call if she has any further problems  Subjective:      Patient ID: Unique Beth is a 70 y o  female  HPI     This is a follow-up for GERD, dysphagia and to discuss her upper endoscopy and manometry studies  She states she continues with intermittent symptoms  She describes a sensation of food coming up her esophagus and occasionally feels like it gets stuck there  She states he may have a few times a week depending on what she is eating  She is not taking a PPI as she does not wish to take medicines on a regular basis  She does take Zantac as needed which does help her symptoms  She denies any abdominal pain or bowel problems  Her EGD was performed in October 2017 which showed a normal esophagus, erosions in the antrum, normal duodenum  Biopsies showed gastritis but was negative for H pylori  She then had a manometry study in Dec which was also within normal limits  Her last colonoscopy was with Dr Radha Lorenzo approximately 2 years ago      Patient Active Problem List   Diagnosis    Allergic rhinitis    Arthritis    Asthma    Gastroesophageal reflux disease without esophagitis    Mixed hyperlipidemia    Osteoarthritis    Osteopenia    Rotator cuff syndrome of right shoulder    Vitamin B12 deficiency    Vitamin D deficiency     Allergies   Allergen Reactions    Amitriptyline      Current Outpatient Prescriptions on File Prior to Visit   Medication Sig    B Complex-Folic Acid (SUPER B COMPLEX MAXI) TABS Take by mouth    Biotin 5000 MCG TABS Take by mouth    Cholecalciferol (VITAMIN D3) 5000 units TABS Take by mouth    cyanocobalamin (VITAMIN B-12) 1,000 mcg tablet Take by mouth daily    fluticasone (FLONASE) 50 mcg/act nasal spray 2 sprays into each nostril as needed      folic acid (FOLVITE) 1 mg tablet Take by mouth    levalbuterol (XOPENEX HFA) 45 mcg/act inhaler Inhale 1 puff every 4 (four) hours as needed for wheezing    montelukast (SINGULAIR) 10 mg tablet Take 10 mg by mouth daily    niacin 500 mg tablet Take 500 mg by mouth daily with breakfast    Probiotic Product (PROBIOTIC-10 PO) Take by mouth     No current facility-administered medications on file prior to visit        Family History   Problem Relation Age of Onset    Diabetes Mother     Hypertension Mother     Skin cancer Mother     Heart disease Family     Other Family      CVA    Cancer Family      Past Medical History:   Diagnosis Date    Abnormal electrocardiogram     Prolonged QT    Asthma     GERD (gastroesophageal reflux disease)     Shortness of breath     Last assessed 03/12/14    Swallowing difficulty      Social History     Social History    Marital status:      Spouse name: N/A    Number of children: N/A    Years of education: N/A     Social History Main Topics    Smoking status: Never Smoker    Smokeless tobacco: Never Used    Alcohol use Yes      Comment: occasionally    Drug use: No    Sexual activity: Not Asked     Other Topics Concern    None     Social History Narrative    None     Past Surgical History:   Procedure Laterality Date    CATARACT EXTRACTION Bilateral     COLONOSCOPY      CYST REMOVAL      ovarian    ENDOMETRIAL BIOPSY      HEMORROIDECTOMY      TX ESOPHAGOGASTRODUODENOSCOPY TRANSORAL DIAGNOSTIC N/A 10/24/2017    Procedure: ESOPHAGOGASTRODUODENOSCOPY (EGD); Surgeon: Tanna George MD;  Location: [facility]; Service: Gastroenterology    SHOULDER SURGERY Bilateral     rotator cuff    TONSILLECTOMY      TOTAL ABDOMINAL HYSTERECTOMY W/ BILATERAL SALPINGOOPHORECTOMY           Review of Systems   All other systems reviewed and are negative  Objective:      /82 (BP Location: Left arm, Patient Position: Sitting, Cuff Size: Adult)   Pulse 76   Temp (!) 97 4 °F (36 3 °C) (Tympanic)   Ht 5' 2 5" (1 588 m)   Wt 73 9 kg (163 lb)   BMI 29 34 kg/m²          Physical Exam   Constitutional: She is oriented to person, place, and time  She appears well-developed and well-nourished  HENT:   Head: Normocephalic and atraumatic  Eyes: Conjunctivae and EOM are normal    Neck: Normal range of motion  Cardiovascular: Normal rate and regular rhythm  Pulmonary/Chest: Effort normal and breath sounds normal    Abdominal: Soft  Bowel sounds are normal  She exhibits no distension  There is no tenderness  Musculoskeletal: Normal range of motion  Neurological: She is alert and oriented to person, place, and time  Skin: Skin is warm and dry  Psychiatric: She has a normal mood and affect   Her behavior is normal  DC instructions

## 2021-01-12 NOTE — ED STATDOCS - OBJECTIVE STATEMENT
F/u visit with pt at RT. Pt c/o fatigue only. Last treatment on 17. All questions answered. NCCN Distress Tool    Cassy Wilson  was reassessed for management of distress using the NCCN Distress Thermometer for Patients tool. Mild to moderate distress  Scorin-4        Yes    No    -Practical/physical issues       []      [x]      -Provide community resources      [x]      []      -Provide list of support groups      [x]      []      -Provide list of national organizations and websites              [x]      []      -Provide ongoing supportive care by oncology medical team        [x]      []                  Comments/Referrals/Services provided:  Score:1. No concerns at this time.     Moderate to severe distress  Scorin or greater                   Yes    No    -Navigator consulted with MD      []      []     -Navigator follow up         []      []     -Spiritual concerns        []      []     -Emotional/family concerns       []      []     -Refer to /mental health professional/PCP   []      []      Comments/Referral/Services provided:  No.
81 y/o male with PMHx of Afib on Eliquis, BPH, Hearing Loss, HTN, Hypercholesteremia, OA presents to the ED c/o laceration to right leg. Pt states he was cutting wood, and states nail on leg grazed pt's right leg. Pt currently c/o laceration to right lower leg. Pt is on Eliquis. Unsure if Tetanus UTD.

## 2021-01-12 NOTE — ED ADULT TRIAGE NOTE - AS TEMP SITE
[address]  Evaluation     Patient Name: Cheryl Carrera  AGKPB'P Date: 4/18/2018  Problem List  Patient Active Problem List   Diagnosis    Diabetes mellitus type 2, uncontrolled ([facility] )    Glaucoma    Hyperlipidemia    Hypertension    Hyponatremia    Depression with anxiety    Paroxysmal atrial fibrillation (HCC)    Acute congestive heart failure ([facility] )    Slurred speech    Lethargy    Ataxia    Neurological deficit, transient    Type 2 diabetes mellitus with hyperglycemia ([facility] )    CVA (cerebral vascular accident) ([facility] )    Hearing loss    Insomnia    Left knee pain    PERI (obstructive sleep apnea)    Osteoarthritis    Osteoporosis    Periodic limb movement sleep disorder    Sleep talking    Tinnitus    Tricuspid valve disorders, non-rheumatic    Unsteady gait    History of cardioembolic cerebrovascular accident (CVA)    Viral upper respiratory tract infection    Chronic diastolic heart failure (HCC)    S/P TAVR (transcatheter aortic valve replacement)    Respiratory insufficiency, post operative     Past Medical History  Past Medical History:   Diagnosis Date    Ambulatory dysfunction     CHF (congestive heart failure) (HCC)     pEFHF    Cholelithiasis     Coronary artery disease     Depression with anxiety     Diabetes mellitus ([facility] )     niddm    Dysfunction of eustachian tube     Glaucoma     Hematuria     Hypercholesterolemia     Hypertension     Hyponatremia     Ischemic stroke of frontal lobe (HCC)     Right     Obstructive sleep apnea     OP (osteoporosis)     PAF (paroxysmal atrial fibrillation) (HCC)     Urinary frequency     Vertigo     Viral gastroenteritis     Viral gastroenteritis     Viral infection      Past Surgical History  Past Surgical History:   Procedure Laterality Date    APPENDECTOMY      GLAUCOMA SURGERY  01/03/2016    [place] REPLACE AORTIC VALVE OPENFEMORAL ARTERY APPROACH N/A 4/17/2018    Procedure: REPLACEMENT AORTIC VALVE TRANSCATHETER (TAVR) TRANSFEMORAL W/ 23 MM AGUILAR STEVE S3 VALVE;  Surgeon: Kody Jang DO;  [facility]: [facility];  Service: Cardiac Surgery    TUBAL LIGATION             04/18/18 1120   Note Type   Note type Eval/Treat   Restrictions/Precautions   Weight Bearing Precautions Per Order No   Other Precautions Cardiac/sternal;Multiple lines;Telemetry; Fall Risk;Pain  (a-line, catheter)   Pain Assessment   Pain Assessment No/denies pain   Pain Score No Pain   Diversional Activities Television   Home Living   Type of [address] One level;Stairs to enter without rails   Bathroom Shower/Tub Tub/shower unit  (sponge bathes at sink)   [facility]; Wheelchair-manual  (only uses cane occasionally)   Additional Comments Pt reports living in 1 story home w/ 1 GANESH   Prior Function   Level of [place] Independent with ADLs and functional mobility; Needs assistance with IADLs   Lives With Daughter; Other (Comment); Family  (son-in-law)   Receives Help From Family   ADL Assistance Independent   IADLs Needs assistance   Vocational Retired   Comments Pt reports being I w/ ADLS, needs assist for IADLS, and is Mod I w/ transfers and functional mobility PTA   Lifestyle   Autonomy Pt reports being I w/ ADLS, needs assist for IADLS, and is Mod I w/ transfers and functional mobility PTA   Reciprocal Relationships Pt lives w/ daughter and son-in-law- they work during the day   Service to Others Pt is retired   Intrinsic Gratification Pt reports enjoying watching game shows   Psychosocial   Psychosocial (WDL) [address]  7  [facility] [place] 5  [address] [facility] 4  [address] 3  Moderate Assistance   [address] 4  [address] 3  Moderate Assistance   [facility]  5  [id number] [facility] 4  [address] [facility] Additional Comments Unable to assess, pt seated in chair prior to and end of session   Transfers   Sit to Stand 3  Moderate assistance   Additional items Assist x 1; Increased time required;Verbal cues;Armrests   Stand to Sit 3  Moderate assistance   Additional items Assist x 1; Increased time required;Verbal cues   Additional Comments Pt performed sit-stand from chair w/ Mod A x1 for moderate force production into standing and VC for proper technique w/ use of RW   Functional Mobility   Functional Mobility 4  Minimal assistance   Additional Comments Pt ambulated short distance w/ Min A x1 for safety and balance and RW in standing for support and stability  Pt required A of 2 others to manage lines and wires  Upon ambulation pt reported feeling weak and naustious  After sitting in chair pt vomited small amount in basin   Additional items Rolling walker   Balance   Static Sitting Fair +   Dynamic Sitting Poor   Static Standing Fair -   Ambulatory Poor +   Activity Tolerance   Activity Tolerance Patient limited by fatigue   Medical Staff Made Aware PT, Jesus and PCA   Nurse Made Aware yes, Shama   RUE Assessment   RUE Assessment WFL   LUE Assessment   LUE Assessment WFL   Hand Function   Gross Motor Coordination Functional   Fine Motor Coordination Functional   Cognition   Overall Cognitive Status WFL   Arousal/Participation Alert; Responsive; Cooperative   Attention Within functional limits   Orientation Level Oriented X4   Memory Within functional limits   Following Commands Follows one step commands without difficulty   Comments Pt is pleasant and cooperative   Assessment   Limitation Decreased ADL status; Decreased UE strength;Decreased endurance;Decreased self-care trans;Decreased high-level ADLs; Decreased UE ROM; Decreased Safe judgement during ADL   Prognosis Fair   Assessment Pt is a 80 y/o female seen for OT eval s/p adm to [facility] for CT surg eval  She was recently admitted to the hospital for an episode of acute on chronic diastolic heart failure  Pt is now s/p TAVR  Comorbidities include a h/o cholelithiasis, depression w/ anxiety, DM , glaucoma , hematuria, HTN, hyponatremia, ischemic stroke of frontal lobe, OP , PAF, vertigo, and viral gastroenteritis  Pt with active OT orders and up as tolerated  Pt lives with daughter and son-in law in 1 story home w/ 1 GANESH   Pt was I w/  ADLS, has assist for IADLS, has not been driving, & required occasional use of DME PTA including a cane, pt also has a wheelchair but doesn't use  Pt is currently demonstrating the following occupational deficits: Min A UB ADLS, Mod A LB ADLS, Mod A transfers and Min A functional mobility w/ RW   Pt with deficits and limitations in all baseline areas of occupation 2* cardiac precautions, fatigue, nausea, decreased endurance, decreased activity tolerance, decreased functional mobility, decreased balance, decreased strength, generalized weakness, unsupportive home environment during the day, and decreased I w/ ADLS compared to previous level of functioning   The following Occupational Performance Areas to address include: eating, grooming, bathing/shower, toilet hygiene, dressing, medication management, socialization, health maintenance, functional mobility, community mobility, clothing management and social participation  Pt scored overall 40/100 on the Barthel Index  Based on the aforementioned OT evaluation, functional performance deficits, and assessments, pt has been identified as a high complexity evaluation  Recommend pt d/c to STR when medically stable   Pt to continue to benefit from acute immediate OT services to address the following goals 3-5x/wk to  w/in 7-10 days:   Goals   Patient Goals none expressed   LTG Time Frame 7-10   Long Term Goal #1 see below listed goals   Plan   Treatment Interventions ADL retraining;Functional transfer training;UE strengthening/ROM; Endurance training;Patient/family training;Equipment evaluation/education; Compensatory technique education;Continued evaluation;Cardiac education; Energy conservation; Activityengagement   Goal Expiration Date 04/28/18   OT Frequency 3-5x/wk   Recommendation   OT Discharge Recommendation Short Term Rehab   OT - OK to Discharge Yes  (when medically stable)   Barthel Index   Feeding 10   Bathing 0   Grooming Score 5   Dressing Score 5   Bladder Score 0   Bowels Score 10   Toilet Use Score 5   Transfers (Bed/Chair) Score 5   Mobility (Level Surface) Score 0   Stairs Score 0   Barthel Index Score 40   Modified Meredith Scale   Modified Brittany Scale 4     GOALS     1) Pt will improve activity tolerance to G for min 30 min txment sessions    2) Pt will complete ADLs/self care w/ mod I using adaptive device and DME as needed    3) Pt will complete toileting w/ mod I w/ G hygiene/thoroughness using DME as needed    4) Pt will improve functional transfers on/off all surfaces using DME as needed w/ G balance/safety including w/ mod I    5) Pt will improve functional mobility during ADL/IADL/leisure tasks using DME as needed w/ G balance/safety w/ mod I    6) Pt will engage in ongoing cognitive assessment w/ G participation w/ mod I to assist w/ safe d/c planning/recommendations    7) Pt will demonstrate G carryover of pt/caregiver education and training as appropriate w/ mod I w/o cues w/ good tolerance    8) Pt will demonstrate 100% carryover of energy conservation techniques w/ mod I t/o functional I/ADL/leisure tasks w/o cues s/p skilled education    9) Pt will engage in cardiac education using the Recovering After Cardiac Rehabilitation packet w/ G participation and G carryover      10) Pt will demonstrate 100% carryover of precautions s/p review w/o cues w/ mod I w/ G tolerance/participation t/o functional ADL/IADL/leisure tasks      [place], OTR/L oral

## 2021-01-12 NOTE — ED ADULT TRIAGE NOTE - CHIEF COMPLAINT QUOTE
Patient comes in s/p wood falling on his leg. Patient has laceration to lower right leg d/t nail being in wood. Patient takes Eliquis. Leg wrapped by EMS. Bleeding controlled. Patient found to have bilateral leg swelling, which he states is not normal.

## 2021-01-12 NOTE — ED STATDOCS - ATTENDING CONTRIBUTION TO CARE
I, Ramses Bonilla MD,  performed the initial face to face bedside interview with this patient regarding history of present illness, review of symptoms and relevant past medical, social and family history.  I completed an independent physical examination.  I was the initial provider who evaluated this patient. I have signed out the follow up of any pending tests (i.e. labs, radiological studies) to the ACP.  I have communicated the patient’s plan of care and disposition with the ACP.  The history, relevant review of systems, past medical and surgical history, medical decision making, and physical examination was documented by the scribe in my presence and I attest to the accuracy of the documentation.

## 2021-01-12 NOTE — ED STATDOCS - PROGRESS NOTE DETAILS
Pt with small area of skin avulsion to R lower anterior leg / shin.  Initially bleeding was controleld after dressing removal.  Then bleeding started again.  I cleaned with Maddy saline.  Applied Quick clot and pressure dressing with guaze and ACE wrap.  Pt observed for an hour in ST without bleeding through dressing.  Will dc home.  Elevate legs.  Remove dressing with saline in 48 hours. Pt with small area of skin avulsion to R lower anterior leg / shin.  Initially bleeding was controlled after dressing removal.  During cleaning of area, bleeding started again.  I cleaned with Maddy saline.  I Applied Quick clot and pressure dressing with guaze and ACE wrap.  Pt observed for an hour in ST without bleeding through dressing.  Will dc home.  Elevate legs.  Remove dressing with saline in 24 - 48 hours.  Pt's legs are edematous bilat with varicosities to R leg.  Instructed pt to arrange close F?U with Dr. Taylor in 2 days.  Layne Gross PA-C

## 2021-01-12 NOTE — ED ADULT NURSE NOTE - NSIMPLEMENTINTERV_GEN_ALL_ED
Implemented All Universal Safety Interventions:  [place] to call system. Call bell, personal items and telephone within reach. Instruct patient to call for assistance. Room bathroom lighting operational. Non-slip footwear when patient is off stretcher. Physically safe environment: no spills, clutter or unnecessary equipment. Stretcher in lowest position, wheels locked, appropriate side rails in place.

## 2021-04-24 ENCOUNTER — INPATIENT (INPATIENT)
Facility: HOSPITAL | Age: 81
LOS: 2 days | Discharge: HOME HEALTH | DRG: 683 | End: 2021-04-27
Attending: FAMILY MEDICINE | Admitting: INTERNAL MEDICINE
Payer: MEDICARE

## 2021-04-24 VITALS — WEIGHT: 242.95 LBS | HEIGHT: 72 IN

## 2021-04-24 DIAGNOSIS — Z98.890 OTHER SPECIFIED POSTPROCEDURAL STATES: Chronic | ICD-10-CM

## 2021-04-24 DIAGNOSIS — N39.0 URINARY TRACT INFECTION, SITE NOT SPECIFIED: ICD-10-CM

## 2021-04-24 LAB
ALBUMIN SERPL ELPH-MCNC: 3.3 G/DL — SIGNIFICANT CHANGE UP (ref 3.3–5)
ALP SERPL-CCNC: 85 U/L — SIGNIFICANT CHANGE UP (ref 40–120)
ALT FLD-CCNC: 13 U/L — SIGNIFICANT CHANGE UP (ref 12–78)
ANION GAP SERPL CALC-SCNC: 5 MMOL/L — SIGNIFICANT CHANGE UP (ref 5–17)
APPEARANCE UR: CLEAR — SIGNIFICANT CHANGE UP
APTT BLD: 32.9 SEC — SIGNIFICANT CHANGE UP (ref 27.5–35.5)
AST SERPL-CCNC: 17 U/L — SIGNIFICANT CHANGE UP (ref 15–37)
BASOPHILS # BLD AUTO: 0.02 K/UL — SIGNIFICANT CHANGE UP (ref 0–0.2)
BASOPHILS NFR BLD AUTO: 0.2 % — SIGNIFICANT CHANGE UP (ref 0–2)
BILIRUB SERPL-MCNC: 1.3 MG/DL — HIGH (ref 0.2–1.2)
BILIRUB UR-MCNC: NEGATIVE — SIGNIFICANT CHANGE UP
BUN SERPL-MCNC: 37 MG/DL — HIGH (ref 7–23)
CALCIUM SERPL-MCNC: 9.4 MG/DL — SIGNIFICANT CHANGE UP (ref 8.5–10.1)
CHLORIDE SERPL-SCNC: 105 MMOL/L — SIGNIFICANT CHANGE UP (ref 96–108)
CO2 SERPL-SCNC: 27 MMOL/L — SIGNIFICANT CHANGE UP (ref 22–31)
COLOR SPEC: YELLOW — SIGNIFICANT CHANGE UP
CREAT SERPL-MCNC: 2.45 MG/DL — HIGH (ref 0.5–1.3)
DIFF PNL FLD: ABNORMAL
EOSINOPHIL # BLD AUTO: 0.07 K/UL — SIGNIFICANT CHANGE UP (ref 0–0.5)
EOSINOPHIL NFR BLD AUTO: 0.6 % — SIGNIFICANT CHANGE UP (ref 0–6)
GLUCOSE SERPL-MCNC: 104 MG/DL — HIGH (ref 70–99)
GLUCOSE UR QL: NEGATIVE MG/DL — SIGNIFICANT CHANGE UP
HCT VFR BLD CALC: 45.1 % — SIGNIFICANT CHANGE UP (ref 39–50)
HGB BLD-MCNC: 14.9 G/DL — SIGNIFICANT CHANGE UP (ref 13–17)
IMM GRANULOCYTES NFR BLD AUTO: 0.5 % — SIGNIFICANT CHANGE UP (ref 0–1.5)
INR BLD: 1.51 RATIO — HIGH (ref 0.88–1.16)
KETONES UR-MCNC: NEGATIVE — SIGNIFICANT CHANGE UP
LACTATE SERPL-SCNC: 1 MMOL/L — SIGNIFICANT CHANGE UP (ref 0.7–2)
LEUKOCYTE ESTERASE UR-ACNC: ABNORMAL
LYMPHOCYTES # BLD AUTO: 0.54 K/UL — LOW (ref 1–3.3)
LYMPHOCYTES # BLD AUTO: 4.8 % — LOW (ref 13–44)
MCHC RBC-ENTMCNC: 32.7 PG — SIGNIFICANT CHANGE UP (ref 27–34)
MCHC RBC-ENTMCNC: 33 GM/DL — SIGNIFICANT CHANGE UP (ref 32–36)
MCV RBC AUTO: 98.9 FL — SIGNIFICANT CHANGE UP (ref 80–100)
MONOCYTES # BLD AUTO: 0.84 K/UL — SIGNIFICANT CHANGE UP (ref 0–0.9)
MONOCYTES NFR BLD AUTO: 7.4 % — SIGNIFICANT CHANGE UP (ref 2–14)
NEUTROPHILS # BLD AUTO: 9.75 K/UL — HIGH (ref 1.8–7.4)
NEUTROPHILS NFR BLD AUTO: 86.5 % — HIGH (ref 43–77)
NITRITE UR-MCNC: POSITIVE
PH UR: 5 — SIGNIFICANT CHANGE UP (ref 5–8)
PLATELET # BLD AUTO: 202 K/UL — SIGNIFICANT CHANGE UP (ref 150–400)
POTASSIUM SERPL-MCNC: 3.9 MMOL/L — SIGNIFICANT CHANGE UP (ref 3.5–5.3)
POTASSIUM SERPL-SCNC: 3.9 MMOL/L — SIGNIFICANT CHANGE UP (ref 3.5–5.3)
PROT SERPL-MCNC: 7.3 GM/DL — SIGNIFICANT CHANGE UP (ref 6–8.3)
PROT UR-MCNC: 30 MG/DL
PROTHROM AB SERPL-ACNC: 17.2 SEC — HIGH (ref 10.6–13.6)
RAPID RVP RESULT: SIGNIFICANT CHANGE UP
RBC # BLD: 4.56 M/UL — SIGNIFICANT CHANGE UP (ref 4.2–5.8)
RBC # FLD: 14.2 % — SIGNIFICANT CHANGE UP (ref 10.3–14.5)
SARS-COV-2 RNA SPEC QL NAA+PROBE: SIGNIFICANT CHANGE UP
SODIUM SERPL-SCNC: 137 MMOL/L — SIGNIFICANT CHANGE UP (ref 135–145)
SP GR SPEC: 1.01 — SIGNIFICANT CHANGE UP (ref 1.01–1.02)
UROBILINOGEN FLD QL: NEGATIVE MG/DL — SIGNIFICANT CHANGE UP
WBC # BLD: 11.28 K/UL — HIGH (ref 3.8–10.5)
WBC # FLD AUTO: 11.28 K/UL — HIGH (ref 3.8–10.5)

## 2021-04-24 PROCEDURE — 51700 IRRIGATION OF BLADDER: CPT

## 2021-04-24 PROCEDURE — 36415 COLL VENOUS BLD VENIPUNCTURE: CPT

## 2021-04-24 PROCEDURE — 80048 BASIC METABOLIC PNL TOTAL CA: CPT

## 2021-04-24 PROCEDURE — 83605 ASSAY OF LACTIC ACID: CPT

## 2021-04-24 PROCEDURE — 99223 1ST HOSP IP/OBS HIGH 75: CPT

## 2021-04-24 PROCEDURE — 86769 SARS-COV-2 COVID-19 ANTIBODY: CPT

## 2021-04-24 PROCEDURE — 93005 ELECTROCARDIOGRAM TRACING: CPT

## 2021-04-24 PROCEDURE — 99285 EMERGENCY DEPT VISIT HI MDM: CPT | Mod: CS,25

## 2021-04-24 PROCEDURE — 74176 CT ABD & PELVIS W/O CONTRAST: CPT

## 2021-04-24 PROCEDURE — 71045 X-RAY EXAM CHEST 1 VIEW: CPT

## 2021-04-24 PROCEDURE — 85025 COMPLETE CBC W/AUTO DIFF WBC: CPT

## 2021-04-24 PROCEDURE — 87040 BLOOD CULTURE FOR BACTERIA: CPT

## 2021-04-24 PROCEDURE — 71045 X-RAY EXAM CHEST 1 VIEW: CPT | Mod: 26

## 2021-04-24 PROCEDURE — 74176 CT ABD & PELVIS W/O CONTRAST: CPT | Mod: 26

## 2021-04-24 PROCEDURE — 0225U NFCT DS DNA&RNA 21 SARSCOV2: CPT

## 2021-04-24 RX ORDER — CEFTRIAXONE 500 MG/1
1000 INJECTION, POWDER, FOR SOLUTION INTRAMUSCULAR; INTRAVENOUS ONCE
Refills: 0 | Status: COMPLETED | OUTPATIENT
Start: 2021-04-24 | End: 2021-04-24

## 2021-04-24 RX ORDER — HEPARIN SODIUM 5000 [USP'U]/ML
5000 INJECTION INTRAVENOUS; SUBCUTANEOUS EVERY 12 HOURS
Refills: 0 | Status: DISCONTINUED | OUTPATIENT
Start: 2021-04-24 | End: 2021-04-24

## 2021-04-24 RX ORDER — TAMSULOSIN HYDROCHLORIDE 0.4 MG/1
0.4 CAPSULE ORAL AT BEDTIME
Refills: 0 | Status: DISCONTINUED | OUTPATIENT
Start: 2021-04-24 | End: 2021-04-27

## 2021-04-24 RX ORDER — CEFTRIAXONE 500 MG/1
1000 INJECTION, POWDER, FOR SOLUTION INTRAMUSCULAR; INTRAVENOUS ONCE
Refills: 0 | Status: DISCONTINUED | OUTPATIENT
Start: 2021-04-24 | End: 2021-04-24

## 2021-04-24 RX ORDER — APIXABAN 2.5 MG/1
2.5 TABLET, FILM COATED ORAL
Refills: 0 | Status: DISCONTINUED | OUTPATIENT
Start: 2021-04-24 | End: 2021-04-27

## 2021-04-24 RX ORDER — ATORVASTATIN CALCIUM 80 MG/1
10 TABLET, FILM COATED ORAL AT BEDTIME
Refills: 0 | Status: DISCONTINUED | OUTPATIENT
Start: 2021-04-24 | End: 2021-04-27

## 2021-04-24 RX ORDER — METOPROLOL TARTRATE 50 MG
100 TABLET ORAL DAILY
Refills: 0 | Status: DISCONTINUED | OUTPATIENT
Start: 2021-04-24 | End: 2021-04-27

## 2021-04-24 RX ORDER — APIXABAN 2.5 MG/1
5 TABLET, FILM COATED ORAL
Refills: 0 | Status: DISCONTINUED | OUTPATIENT
Start: 2021-04-24 | End: 2021-04-24

## 2021-04-24 RX ORDER — ONDANSETRON 8 MG/1
4 TABLET, FILM COATED ORAL EVERY 6 HOURS
Refills: 0 | Status: DISCONTINUED | OUTPATIENT
Start: 2021-04-24 | End: 2021-04-27

## 2021-04-24 RX ADMIN — TAMSULOSIN HYDROCHLORIDE 0.4 MILLIGRAM(S): 0.4 CAPSULE ORAL at 22:40

## 2021-04-24 RX ADMIN — CEFTRIAXONE 1000 MILLIGRAM(S): 500 INJECTION, POWDER, FOR SOLUTION INTRAMUSCULAR; INTRAVENOUS at 14:45

## 2021-04-24 RX ADMIN — ATORVASTATIN CALCIUM 10 MILLIGRAM(S): 80 TABLET, FILM COATED ORAL at 22:39

## 2021-04-24 NOTE — H&P ADULT - NSHPPHYSICALEXAM_GEN_ALL_CORE
· CONSTITUTIONAL: Well appearing, awake, alert, oriented to person, place, time/situation and in no apparent distress.  · ENMT: Airway patent, Nasal mucosa clear. . Throat has no vesicles, no oropharyngeal exudates and uvula is midline. +dry mucous membranes  · EYES: Clear bilaterally, pupils equal, round and reactive to light.  · CARDIAC: Normal rate, irregularly irregular rhythm. Heart sounds S1, S2.  +murmur  · RESPIRATORY: Breath sounds clear and equal bilaterally.  · GASTROINTESTINAL: Abdomen soft, non-tender, no guarding.  · GENITOURINARY: +small incontinence of urine  · MUSCULOSKELETAL: Spine appears normal, range of motion is not limited, no muscle or joint tenderness  · NEUROLOGICAL: Alert and oriented, no focal deficits, no motor or sensory deficits.  · SKIN: Skin normal color for race, warm, dry and intact. No evidence of rash. · CONSTITUTIONAL: Well appearing, awake, alert, oriented to person, place, time/situation and in no apparent distress.  · ENMT: Airway patent, Nasal mucosa clear. . Throat has no vesicles, no oropharyngeal exudates and uvula is midline. +dry mucous membranes  · EYES: Clear bilaterally, pupils equal, round and reactive to light.  · CARDIAC: Normal rate, irregularly irregular rhythm. Heart sounds S1, S2.  +murmur  · RESPIRATORY: Breath sounds clear and equal bilaterally.  · GASTROINTESTINAL: Abdomen soft, non-tender, no guarding.  · GENITOURINARY: Goode hematuria  · MUSCULOSKELETAL: Spine appears normal, range of motion is not limited, no muscle or joint tenderness; chronic derm changes lower extr  · NEUROLOGICAL: Alert and oriented, no focal deficits, no motor or sensory deficits.

## 2021-04-24 NOTE — H&P ADULT - NSHPLABSRESULTS_GEN_ALL_CORE
14.9   11.28 )-----------( 202      ( 24 Apr 2021 13:22 )             45.1   04-24    137  |  105  |  37<H>  ----------------------------<  104<H>  3.9   |  27  |  2.45<H>    Ca    9.4      24 Apr 2021 14:09    TPro  7.3  /  Alb  3.3  /  TBili  1.3<H>  /  DBili  x   /  AST  17  /  ALT  13  /  AlkPhos  85  04-24

## 2021-04-24 NOTE — ED PROVIDER NOTE - CARE PLAN
Principal Discharge DX:	UTI (urinary tract infection)  Secondary Diagnosis:	Urinary retention  Secondary Diagnosis:	CRISPIN (acute kidney injury)

## 2021-04-24 NOTE — ED PROVIDER NOTE - OBJECTIVE STATEMENT
79 y/o male with PMHx of HTN, HLD, OA, BPH, Afib on Eliquis presents to the ED c/o urinary retention x2 days. Patient reports that he has had similar episode in the past requiring irrigation, does not remember his Urologist. Patient tried to drink fluids for relief but made symptoms worse. Denies any SOB, CP, abd pain, N/V/D, fever or chills.

## 2021-04-24 NOTE — ED STATDOCS - PROGRESS NOTE DETAILS
Jason BARROS for    81 y/o male with PMHx of HTN, HLD, OA, BPH, Afib on Eliquis presents to the ED c/o urinary retention x2 days associated with lower back pain. States Pt states he has Hx of this in the past which required bladder irrigation. Pt is on Eliquis. Denies fevers, but states he feels cold. Pt with chills, back pain as well, given prior need for CBI and further treatment. Will send pt to main ED for further evaluation.

## 2021-04-24 NOTE — H&P ADULT - HISTORY OF PRESENT ILLNESS
79 y/o male with PMHx of HTN, HLD, OA, BPH, Afib on Eliquis presents to the ED c/o urinary retention x2 days. Patient reports that he has had similar episode in the past requiring irrigation, does not remember his Urologist. Patient tried to drink fluids for relief but made symptoms worse. Denies any SOB, CP, abd pain, N/V/D, fever or chills; Goode placed in ED 79 y/o male with PMHx of HTN, HLD, OA, BPH, Afib on Eliquis presents to the ED c/o urinary retention x2 days. Patient reports that he has had similar episode in the past requiring irrigation, does not remember his Urologist. Patient tried to drink fluids for relief but made symptoms worse. Denies any SOB, CP, abd pain, N/V/D, fever or chills; Goode placed in ED with difficulty, mild hematuria noted.  Of not pt is been taking Elquis for years once a day since a major  bleed; aware this can cause a cva; daughter present. Rene for CRISPIN and home care eval; check US bladder, prostate start Flomax.

## 2021-04-24 NOTE — ED ADULT NURSE NOTE - OBJECTIVE STATEMENT
Pt comes to the ED complaining of back pain and inability to urinate since Thursday afternoon. Pt states that he has not had much to eat/drink in the past day because it makes it worse.

## 2021-04-24 NOTE — ED PROVIDER NOTE - ENMT, MLM
Airway patent, Nasal mucosa clear. . Throat has no vesicles, no oropharyngeal exudates and uvula is midline. +dry mucous membranes

## 2021-04-24 NOTE — ED ADULT NURSE NOTE - ISOLATION TYPE:
None Nosebleeds Normal Treatment: I explained this is common when taking isotretinoin. I recommended saline mist in each nostril multiple times a day. If this worsens they will contact us.

## 2021-04-24 NOTE — H&P ADULT - NSICDXPASTMEDICALHX_GEN_ALL_CORE_FT
PAST MEDICAL HISTORY:  Atrial fibrillation     BPH (Benign Prostatic Hypertrophy) laser 2010    H/O varicose veins     Hearing loss     HTN (hypertension)     Hypercholesteremia     Legally blind right eye    OA (osteoarthritis) of hip     Obesity

## 2021-04-24 NOTE — H&P ADULT - ASSESSMENT
* Acute urinary retention and CRISPIN  Goode  start Flomax  IVF  Urology consult  US bladder      * Hematuria  mild  c/w Eliquis; if persistent, worsening dc AC    * HTN resume BB  neither pt nor daughter has list of meds; Dr Dupree confirms some of the meds    Will need home care

## 2021-04-25 LAB
ANION GAP SERPL CALC-SCNC: 4 MMOL/L — LOW (ref 5–17)
BASOPHILS # BLD AUTO: 0.01 K/UL — SIGNIFICANT CHANGE UP (ref 0–0.2)
BASOPHILS NFR BLD AUTO: 0.1 % — SIGNIFICANT CHANGE UP (ref 0–2)
BUN SERPL-MCNC: 23 MG/DL — SIGNIFICANT CHANGE UP (ref 7–23)
CALCIUM SERPL-MCNC: 8.5 MG/DL — SIGNIFICANT CHANGE UP (ref 8.5–10.1)
CHLORIDE SERPL-SCNC: 105 MMOL/L — SIGNIFICANT CHANGE UP (ref 96–108)
CO2 SERPL-SCNC: 29 MMOL/L — SIGNIFICANT CHANGE UP (ref 22–31)
COVID-19 SPIKE DOMAIN AB INTERP: POSITIVE
COVID-19 SPIKE DOMAIN ANTIBODY RESULT: >250 U/ML — HIGH
CREAT SERPL-MCNC: 1.09 MG/DL — SIGNIFICANT CHANGE UP (ref 0.5–1.3)
EOSINOPHIL # BLD AUTO: 0.16 K/UL — SIGNIFICANT CHANGE UP (ref 0–0.5)
EOSINOPHIL NFR BLD AUTO: 1.7 % — SIGNIFICANT CHANGE UP (ref 0–6)
GLUCOSE SERPL-MCNC: 92 MG/DL — SIGNIFICANT CHANGE UP (ref 70–99)
HCT VFR BLD CALC: 39.5 % — SIGNIFICANT CHANGE UP (ref 39–50)
HGB BLD-MCNC: 13.4 G/DL — SIGNIFICANT CHANGE UP (ref 13–17)
IMM GRANULOCYTES NFR BLD AUTO: 0.3 % — SIGNIFICANT CHANGE UP (ref 0–1.5)
LYMPHOCYTES # BLD AUTO: 0.97 K/UL — LOW (ref 1–3.3)
LYMPHOCYTES # BLD AUTO: 10.1 % — LOW (ref 13–44)
MCHC RBC-ENTMCNC: 33.1 PG — SIGNIFICANT CHANGE UP (ref 27–34)
MCHC RBC-ENTMCNC: 33.9 GM/DL — SIGNIFICANT CHANGE UP (ref 32–36)
MCV RBC AUTO: 97.5 FL — SIGNIFICANT CHANGE UP (ref 80–100)
MONOCYTES # BLD AUTO: 0.61 K/UL — SIGNIFICANT CHANGE UP (ref 0–0.9)
MONOCYTES NFR BLD AUTO: 6.4 % — SIGNIFICANT CHANGE UP (ref 2–14)
NEUTROPHILS # BLD AUTO: 7.8 K/UL — HIGH (ref 1.8–7.4)
NEUTROPHILS NFR BLD AUTO: 81.4 % — HIGH (ref 43–77)
PLATELET # BLD AUTO: 165 K/UL — SIGNIFICANT CHANGE UP (ref 150–400)
POTASSIUM SERPL-MCNC: 3.2 MMOL/L — LOW (ref 3.5–5.3)
POTASSIUM SERPL-SCNC: 3.2 MMOL/L — LOW (ref 3.5–5.3)
RBC # BLD: 4.05 M/UL — LOW (ref 4.2–5.8)
RBC # FLD: 14 % — SIGNIFICANT CHANGE UP (ref 10.3–14.5)
SARS-COV-2 IGG+IGM SERPL QL IA: >250 U/ML — HIGH
SARS-COV-2 IGG+IGM SERPL QL IA: POSITIVE
SODIUM SERPL-SCNC: 138 MMOL/L — SIGNIFICANT CHANGE UP (ref 135–145)
WBC # BLD: 9.58 K/UL — SIGNIFICANT CHANGE UP (ref 3.8–10.5)
WBC # FLD AUTO: 9.58 K/UL — SIGNIFICANT CHANGE UP (ref 3.8–10.5)

## 2021-04-25 PROCEDURE — 99222 1ST HOSP IP/OBS MODERATE 55: CPT

## 2021-04-25 PROCEDURE — 99232 SBSQ HOSP IP/OBS MODERATE 35: CPT

## 2021-04-25 PROCEDURE — 93010 ELECTROCARDIOGRAM REPORT: CPT

## 2021-04-25 RX ORDER — CEFTRIAXONE 500 MG/1
1000 INJECTION, POWDER, FOR SOLUTION INTRAMUSCULAR; INTRAVENOUS EVERY 24 HOURS
Refills: 0 | Status: DISCONTINUED | OUTPATIENT
Start: 2021-04-25 | End: 2021-04-27

## 2021-04-25 RX ORDER — SODIUM CHLORIDE 9 MG/ML
1000 INJECTION INTRAMUSCULAR; INTRAVENOUS; SUBCUTANEOUS
Refills: 0 | Status: DISCONTINUED | OUTPATIENT
Start: 2021-04-25 | End: 2021-04-27

## 2021-04-25 RX ORDER — POTASSIUM CHLORIDE 20 MEQ
40 PACKET (EA) ORAL ONCE
Refills: 0 | Status: COMPLETED | OUTPATIENT
Start: 2021-04-25 | End: 2021-04-25

## 2021-04-25 RX ORDER — LACTOBACILLUS ACIDOPHILUS 100MM CELL
1 CAPSULE ORAL
Refills: 0 | Status: DISCONTINUED | OUTPATIENT
Start: 2021-04-25 | End: 2021-04-27

## 2021-04-25 RX ADMIN — CEFTRIAXONE 1000 MILLIGRAM(S): 500 INJECTION, POWDER, FOR SOLUTION INTRAMUSCULAR; INTRAVENOUS at 10:49

## 2021-04-25 RX ADMIN — APIXABAN 2.5 MILLIGRAM(S): 2.5 TABLET, FILM COATED ORAL at 22:47

## 2021-04-25 RX ADMIN — Medication 1 TABLET(S): at 17:32

## 2021-04-25 RX ADMIN — Medication 100 MILLIGRAM(S): at 10:49

## 2021-04-25 RX ADMIN — APIXABAN 2.5 MILLIGRAM(S): 2.5 TABLET, FILM COATED ORAL at 10:49

## 2021-04-25 RX ADMIN — ATORVASTATIN CALCIUM 10 MILLIGRAM(S): 80 TABLET, FILM COATED ORAL at 22:47

## 2021-04-25 RX ADMIN — TAMSULOSIN HYDROCHLORIDE 0.4 MILLIGRAM(S): 0.4 CAPSULE ORAL at 22:47

## 2021-04-25 RX ADMIN — Medication 40 MILLIEQUIVALENT(S): at 15:28

## 2021-04-25 RX ADMIN — SODIUM CHLORIDE 60 MILLILITER(S): 9 INJECTION INTRAMUSCULAR; INTRAVENOUS; SUBCUTANEOUS at 15:28

## 2021-04-25 NOTE — CONSULT NOTE ADULT - ASSESSMENT
79 yo M with BPH, urinary retention, UA concerning for UTI. Recommend treat UTI with abx, maintain crockett until completes course of abx. Follow up with Dr Christensen for outpatient TOV

## 2021-04-25 NOTE — CONSULT NOTE ADULT - SUBJECTIVE AND OBJECTIVE BOX
79 y/o male with PMHx of HTN, HLD, OA, BPH, Afib on Eliquis presents to the ED c/o urinary retention x2 days. Patient reports that he has had similar episode in the past requiring irrigation, does not remember his Urologist. Patient tried to drink fluids for relief but made symptoms worse. Denies any SOB, CP, abd pain, N/V/D, fever or chills; Goode placed in ED with difficulty, mild hematuria noted.  Of not pt is been taking Elquis for years once a day since a major  bleed; aware this can cause a cva; daughter present. Rene for CRISPIN and home care eval; check US bladder, prostate start Flomax.    2021: Urology consulted for urinary retention. Pt reports he follows with Dr Christensen for urologist. He feels better with Goode in place, tolerating well.     Review of Systems:  Other Review of Systems: All other review of systems negative, except as noted in HPI      Allergies and Intolerances:        Allergies:  	No Known Allergies:     Home Medications:   * Patient Currently Takes Medications as of 15-Jul-2019 16:09 documented in Structured Notes  · 	oxycodone-acetaminophen 5 mg-325 mg oral tablet: 1 tab(s) orally every 4 to 6 hours, As Needed for severe pain MDD:6 tablets   · 	Protonix 40 mg oral delayed release tablet: 1 tab(s) orally once a day   · 	Colace 100 mg oral capsule: 1 cap(s) orally 2 times a day  · 	metoprolol succinate 100 mg oral tablet, extended release: 1 tab(s) orally once a day  · 	atorvastatin 10 mg oral tablet: 1 tab(s) orally once a day  · 	lisinopril 5 mg oral tablet: 1 tab(s) orally once a day  · 	Eliquis 5 mg oral tablet: 1 tab(s) orally 2 times a day  · 	omeprazole 20 mg oral delayed release capsule: 1 cap(s) orally once a day    .    Patient History:   Past Medical, Past Surgical, and Family History:  PAST MEDICAL HISTORY:  Atrial fibrillation     BPH (Benign Prostatic Hypertrophy) laser     H/O varicose veins     Hearing loss     HTN (hypertension)     Hypercholesteremia     Legally blind right eye    OA (osteoarthritis) of hip     Obesity.     PAST SURGICAL HISTORY:  H/O colonoscopy     H/O cystoscopy TURP 2017    History of Cataract Surgery right eye .     FAMILY HISTORY:  Family history of melanoma, father.    Social History:  Social History (marital status, living situation, occupation, tobacco use, alcohol and drug use, and sexual history): neg smoking etoh idu      Vital Signs Last 24 Hrs  T(C): 36.3 (2021 08:30), Max: 37.6 (2021 22:37)  T(F): 97.4 (2021 08:30), Max: 99.6 (2021 22:37)  HR: 84 (2021 08:30) (72 - 87)  BP: 137/67 (2021 08:30) (116/69 - 160/78)  BP(mean): 98 (2021 18:10) (98 - 98)  RR: 18 (2021 08:30) (17 - 18)  SpO2: 95% (2021 08:30) (95% - 100%)    NAD, A+Ox3  RRR  no increased WOB  ABD S NT ND, no SP tenderness  Goode draining clear yellow urine                          13.4   9.58  )-----------( 165      ( 2021 10:27 )             39.5     04-25    138  |  105  |  23  ----------------------------<  92  3.2<L>   |  29  |  1.09    Ca    8.5      2021 06:52    TPro  7.3  /  Alb  3.3  /  TBili  1.3<H>  /  DBili  x   /  AST  17  /  ALT  13  /  AlkPhos  85  04-24    Urinalysis Basic - ( 2021 13:24 )    Color: Yellow / Appearance: Clear / S.010 / pH: x  Gluc: x / Ketone: Negative  / Bili: Negative / Urobili: Negative mg/dL   Blood: x / Protein: 30 mg/dL / Nitrite: Positive   Leuk Esterase: Moderate / RBC: >50 /HPF / WBC 6-10   Sq Epi: x / Non Sq Epi: Occasional / Bacteria: Many

## 2021-04-26 ENCOUNTER — TRANSCRIPTION ENCOUNTER (OUTPATIENT)
Age: 81
End: 2021-04-26

## 2021-04-26 LAB
ANION GAP SERPL CALC-SCNC: 6 MMOL/L — SIGNIFICANT CHANGE UP (ref 5–17)
BASOPHILS # BLD AUTO: 0.03 K/UL — SIGNIFICANT CHANGE UP (ref 0–0.2)
BASOPHILS NFR BLD AUTO: 0.5 % — SIGNIFICANT CHANGE UP (ref 0–2)
BUN SERPL-MCNC: 22 MG/DL — SIGNIFICANT CHANGE UP (ref 7–23)
CALCIUM SERPL-MCNC: 9.1 MG/DL — SIGNIFICANT CHANGE UP (ref 8.5–10.1)
CHLORIDE SERPL-SCNC: 106 MMOL/L — SIGNIFICANT CHANGE UP (ref 96–108)
CO2 SERPL-SCNC: 27 MMOL/L — SIGNIFICANT CHANGE UP (ref 22–31)
CREAT SERPL-MCNC: 0.89 MG/DL — SIGNIFICANT CHANGE UP (ref 0.5–1.3)
EOSINOPHIL # BLD AUTO: 0.32 K/UL — SIGNIFICANT CHANGE UP (ref 0–0.5)
EOSINOPHIL NFR BLD AUTO: 5 % — SIGNIFICANT CHANGE UP (ref 0–6)
GLUCOSE SERPL-MCNC: 86 MG/DL — SIGNIFICANT CHANGE UP (ref 70–99)
HCT VFR BLD CALC: 39.6 % — SIGNIFICANT CHANGE UP (ref 39–50)
HGB BLD-MCNC: 12.9 G/DL — LOW (ref 13–17)
IMM GRANULOCYTES NFR BLD AUTO: 0.5 % — SIGNIFICANT CHANGE UP (ref 0–1.5)
LYMPHOCYTES # BLD AUTO: 1.04 K/UL — SIGNIFICANT CHANGE UP (ref 1–3.3)
LYMPHOCYTES # BLD AUTO: 16.2 % — SIGNIFICANT CHANGE UP (ref 13–44)
MCHC RBC-ENTMCNC: 31.7 PG — SIGNIFICANT CHANGE UP (ref 27–34)
MCHC RBC-ENTMCNC: 32.6 GM/DL — SIGNIFICANT CHANGE UP (ref 32–36)
MCV RBC AUTO: 97.3 FL — SIGNIFICANT CHANGE UP (ref 80–100)
MONOCYTES # BLD AUTO: 0.72 K/UL — SIGNIFICANT CHANGE UP (ref 0–0.9)
MONOCYTES NFR BLD AUTO: 11.2 % — SIGNIFICANT CHANGE UP (ref 2–14)
NEUTROPHILS # BLD AUTO: 4.29 K/UL — SIGNIFICANT CHANGE UP (ref 1.8–7.4)
NEUTROPHILS NFR BLD AUTO: 66.6 % — SIGNIFICANT CHANGE UP (ref 43–77)
PLATELET # BLD AUTO: 147 K/UL — LOW (ref 150–400)
POTASSIUM SERPL-MCNC: 3.4 MMOL/L — LOW (ref 3.5–5.3)
POTASSIUM SERPL-SCNC: 3.4 MMOL/L — LOW (ref 3.5–5.3)
RBC # BLD: 4.07 M/UL — LOW (ref 4.2–5.8)
RBC # FLD: 13.9 % — SIGNIFICANT CHANGE UP (ref 10.3–14.5)
SODIUM SERPL-SCNC: 139 MMOL/L — SIGNIFICANT CHANGE UP (ref 135–145)
WBC # BLD: 6.43 K/UL — SIGNIFICANT CHANGE UP (ref 3.8–10.5)
WBC # FLD AUTO: 6.43 K/UL — SIGNIFICANT CHANGE UP (ref 3.8–10.5)

## 2021-04-26 PROCEDURE — 99232 SBSQ HOSP IP/OBS MODERATE 35: CPT

## 2021-04-26 RX ORDER — POTASSIUM CHLORIDE 20 MEQ
40 PACKET (EA) ORAL ONCE
Refills: 0 | Status: COMPLETED | OUTPATIENT
Start: 2021-04-26 | End: 2021-04-26

## 2021-04-26 RX ADMIN — ATORVASTATIN CALCIUM 10 MILLIGRAM(S): 80 TABLET, FILM COATED ORAL at 21:07

## 2021-04-26 RX ADMIN — CEFTRIAXONE 1000 MILLIGRAM(S): 500 INJECTION, POWDER, FOR SOLUTION INTRAMUSCULAR; INTRAVENOUS at 09:55

## 2021-04-26 RX ADMIN — Medication 100 MILLIGRAM(S): at 09:55

## 2021-04-26 RX ADMIN — Medication 40 MILLIEQUIVALENT(S): at 16:06

## 2021-04-26 RX ADMIN — TAMSULOSIN HYDROCHLORIDE 0.4 MILLIGRAM(S): 0.4 CAPSULE ORAL at 21:07

## 2021-04-26 RX ADMIN — Medication 1 TABLET(S): at 16:06

## 2021-04-26 RX ADMIN — APIXABAN 2.5 MILLIGRAM(S): 2.5 TABLET, FILM COATED ORAL at 09:55

## 2021-04-26 RX ADMIN — APIXABAN 2.5 MILLIGRAM(S): 2.5 TABLET, FILM COATED ORAL at 21:07

## 2021-04-26 RX ADMIN — Medication 1 TABLET(S): at 09:55

## 2021-04-26 NOTE — PROGRESS NOTE ADULT - SUBJECTIVE AND OBJECTIVE BOX
79 y/o male with PMHx of HTN, HLD, OA, BPH, Afib on Eliquis presents to the ED c/o urinary retention x2 days. Patient reports that he has had similar episode in the past requiring irrigation, does not remember his Urologist. Patient tried to drink fluids for relief but made symptoms worse. Denies any SOB, CP, abd pain, N/V/D, fever or chills; Crockett placed in ED with difficulty, mild hematuria noted.  Of not pt is been taking Elquis for years once a day since a major  bleed; aware this can cause a cva; daughter present. Rene for CRISPIN and home care eval; check US bladder, prostate start Flomax.    4/25-afebrile, crockett draining tea colored urine , denies abd pain, nausea or vomiting , had 2 loose stools  4/26 hematuria resolved, no new complaints, no diarrhea    · CONSTITUTIONAL: Well appearing, awake, alert, oriented to person, place, time/situation and in no apparent distress.  · ENMT: Airway patent, Nasal mucosa clear. . Throat has no vesicles, no oropharyngeal exudates and uvula is midline. +dry mucous membranes  · EYES: Clear bilaterally, pupils equal, round and reactive to light.  · CARDIAC: Normal rate, irregularly irregular rhythm. Heart sounds S1, S2.  +murmur  · RESPIRATORY: Breath sounds clear and equal bilaterally.  · GASTROINTESTINAL: Abdomen soft, non-tender, no guarding.  · GENITOURINARY: Crockett hematuria  · MUSCULOSKELETAL: Spine appears normal, range of motion is not limited, no muscle or joint tenderness; chronic derm changes lower extr  · NEUROLOGICAL: Alert and oriented, no focal deficits, no motor or sensory deficits.        PHYSICAL EXAM:    Daily     Daily     ICU Vital Signs Last 24 Hrs  T(C): 36.3 (26 Apr 2021 08:58), Max: 37.2 (25 Apr 2021 16:31)  T(F): 97.3 (26 Apr 2021 08:58), Max: 99 (25 Apr 2021 16:31)  HR: 82 (26 Apr 2021 08:58) (79 - 82)  BP: 141/78 (26 Apr 2021 08:58) (124/66 - 144/84)  BP(mean): --  ABP: --  ABP(mean): --  RR: 18 (26 Apr 2021 08:58) (18 - 18)  SpO2: 97% (26 Apr 2021 08:58) (97% - 98%)                                12.9   6.43  )-----------( 147      ( 26 Apr 2021 06:04 )             39.6       CBC Full  -  ( 26 Apr 2021 06:04 )  WBC Count : 6.43 K/uL  RBC Count : 4.07 M/uL  Hemoglobin : 12.9 g/dL  Hematocrit : 39.6 %  Platelet Count - Automated : 147 K/uL  Mean Cell Volume : 97.3 fl  Mean Cell Hemoglobin : 31.7 pg  Mean Cell Hemoglobin Concentration : 32.6 gm/dL  Auto Neutrophil # : 4.29 K/uL  Auto Lymphocyte # : 1.04 K/uL  Auto Monocyte # : 0.72 K/uL  Auto Eosinophil # : 0.32 K/uL  Auto Basophil # : 0.03 K/uL  Auto Neutrophil % : 66.6 %  Auto Lymphocyte % : 16.2 %  Auto Monocyte % : 11.2 %  Auto Eosinophil % : 5.0 %  Auto Basophil % : 0.5 %      04-26    139  |  106  |  22  ----------------------------<  86  3.4<L>   |  27  |  0.89    Ca    9.1      26 Apr 2021 06:04                              MEDICATIONS  (STANDING):  apixaban 2.5 milliGRAM(s) Oral two times a day  atorvastatin 10 milliGRAM(s) Oral at bedtime  cefTRIAXone Injectable. 1000 milliGRAM(s) IV Push every 24 hours  lactobacillus acidophilus 1 Tablet(s) Oral two times a day with meals  metoprolol succinate  milliGRAM(s) Oral daily  potassium chloride    Tablet ER 40 milliEquivalent(s) Oral once  sodium chloride 0.9%. 1000 milliLiter(s) (60 mL/Hr) IV Continuous <Continuous>  tamsulosin 0.4 milliGRAM(s) Oral at bedtime        
79 y/o male with PMHx of HTN, HLD, OA, BPH, Afib on Eliquis presents to the ED c/o urinary retention x2 days. Patient reports that he has had similar episode in the past requiring irrigation, does not remember his Urologist. Patient tried to drink fluids for relief but made symptoms worse. Denies any SOB, CP, abd pain, N/V/D, fever or chills; Crockett placed in ED with difficulty, mild hematuria noted.  Of not pt is been taking Elquis for years once a day since a major  bleed; aware this can cause a cva; daughter present. Rene for CRISPIN and home care eval; check US bladder, prostate start Flomax.    -afebrile, crockett draining tea colored urine , denies abd pain, nausea or vomiting , had 2 loose stools   · CONSTITUTIONAL: Well appearing, awake, alert, oriented to person, place, time/situation and in no apparent distress.  · ENMT: Airway patent, Nasal mucosa clear. . Throat has no vesicles, no oropharyngeal exudates and uvula is midline. +dry mucous membranes  · EYES: Clear bilaterally, pupils equal, round and reactive to light.  · CARDIAC: Normal rate, irregularly irregular rhythm. Heart sounds S1, S2.  +murmur  · RESPIRATORY: Breath sounds clear and equal bilaterally.  · GASTROINTESTINAL: Abdomen soft, non-tender, no guarding.  · GENITOURINARY: Crockett hematuria  · MUSCULOSKELETAL: Spine appears normal, range of motion is not limited, no muscle or joint tenderness; chronic derm changes lower extr  · NEUROLOGICAL: Alert and oriented, no focal deficits, no motor or sensory deficits.      PHYSICAL EXAM:    Daily     Daily     ICU Vital Signs Last 24 Hrs  T(C): 36.3 (2021 08:30), Max: 37.6 (2021 22:37)  T(F): 97.4 (2021 08:30), Max: 99.6 (2021 22:37)  HR: 84 (2021 08:30) (72 - 87)  BP: 137/67 (2021 08:30) (116/69 - 160/78)  BP(mean): 98 (2021 18:10) (98 - 98)  ABP: --  ABP(mean): --  RR: 18 (2021 08:30) (17 - 18)  SpO2: 95% (2021 08:30) (95% - 100%)                              13.4   9.58  )-----------( 165      ( 2021 10:27 )             39.5       CBC Full  -  ( 2021 10:27 )  WBC Count : 9.58 K/uL  RBC Count : 4.05 M/uL  Hemoglobin : 13.4 g/dL  Hematocrit : 39.5 %  Platelet Count - Automated : 165 K/uL  Mean Cell Volume : 97.5 fl  Mean Cell Hemoglobin : 33.1 pg  Mean Cell Hemoglobin Concentration : 33.9 gm/dL  Auto Neutrophil # : 7.80 K/uL  Auto Lymphocyte # : 0.97 K/uL  Auto Monocyte # : 0.61 K/uL  Auto Eosinophil # : 0.16 K/uL  Auto Basophil # : 0.01 K/uL  Auto Neutrophil % : 81.4 %  Auto Lymphocyte % : 10.1 %  Auto Monocyte % : 6.4 %  Auto Eosinophil % : 1.7 %  Auto Basophil % : 0.1 %          138  |  105  |  23  ----------------------------<  92  3.2<L>   |  29  |  1.09    Ca    8.5      2021 06:52    TPro  7.3  /  Alb  3.3  /  TBili  1.3<H>  /  DBili  x   /  AST  17  /  ALT  13  /  AlkPhos  85  04-24      LIVER FUNCTIONS - ( 2021 14:09 )  Alb: 3.3 g/dL / Pro: 7.3 gm/dL / ALK PHOS: 85 U/L / ALT: 13 U/L / AST: 17 U/L / GGT: x             PT/INR - ( 2021 14:09 )   PT: 17.2 sec;   INR: 1.51 ratio         PTT - ( 2021 14:09 )  PTT:32.9 sec          Urinalysis Basic - ( 2021 13:24 )    Color: Yellow / Appearance: Clear / S.010 / pH: x  Gluc: x / Ketone: Negative  / Bili: Negative / Urobili: Negative mg/dL   Blood: x / Protein: 30 mg/dL / Nitrite: Positive   Leuk Esterase: Moderate / RBC: >50 /HPF / WBC 6-10   Sq Epi: x / Non Sq Epi: Occasional / Bacteria: Many            MEDICATIONS  (STANDING):  apixaban 2.5 milliGRAM(s) Oral two times a day  atorvastatin 10 milliGRAM(s) Oral at bedtime  cefTRIAXone Injectable. 1000 milliGRAM(s) IV Push every 24 hours  lactobacillus acidophilus 1 Tablet(s) Oral two times a day with meals  metoprolol succinate  milliGRAM(s) Oral daily  potassium chloride    Tablet ER 40 milliEquivalent(s) Oral once  sodium chloride 0.9%. 1000 milliLiter(s) (60 mL/Hr) IV Continuous <Continuous>  tamsulosin 0.4 milliGRAM(s) Oral at bedtime

## 2021-04-26 NOTE — PROVIDER CONTACT NOTE (OTHER) - SITUATION
LEFT MESSAGE WITH JHONATAN FROM DR. JAMES'S ANSWERING SERVICE.
spoke with Nesha, office made aware of patient admission to [address] [place] [address]. Fax # is [phone]

## 2021-04-26 NOTE — DISCHARGE NOTE NURSING/CASE MANAGEMENT/SOCIAL WORK - NSDCPNINST_GEN_ALL_CORE
Return to ER or call MD if you experience fever greater than 101, decreased urine output or any pain or bleeding.

## 2021-04-26 NOTE — DISCHARGE NOTE NURSING/CASE MANAGEMENT/SOCIAL WORK - PATIENT PORTAL LINK FT
You can access the FollowMyHealth Patient Portal offered by [facility] by registering at the following website: [email or web address]. By joining [facility]’s FollowMyHealth portal, you will also be able to view your health information using other applications (apps) compatible with our system.

## 2021-04-26 NOTE — PROGRESS NOTE ADULT - ASSESSMENT
* Acute urinary retention and CRISPIN  UTI ecoli sensitivities pending  on ceftriaxone  Goode  Flomax    Urology consult appreciated   US bladder      * Hematuria resolved   mild  c/w Eliquis;  * HTN resume BB  neither pt nor daughter has list of meds; Dr Dupree confirms some of the meds    Will need home care

## 2021-04-27 ENCOUNTER — TRANSCRIPTION ENCOUNTER (OUTPATIENT)
Age: 81
End: 2021-04-27

## 2021-04-27 VITALS
HEART RATE: 64 BPM | TEMPERATURE: 98 F | OXYGEN SATURATION: 96 % | RESPIRATION RATE: 18 BRPM | SYSTOLIC BLOOD PRESSURE: 148 MMHG | DIASTOLIC BLOOD PRESSURE: 87 MMHG

## 2021-04-27 PROCEDURE — 99239 HOSP IP/OBS DSCHRG MGMT >30: CPT

## 2021-04-27 RX ORDER — TAMSULOSIN HYDROCHLORIDE 0.4 MG/1
1 CAPSULE ORAL
Qty: 0 | Refills: 0 | DISCHARGE
Start: 2021-04-27

## 2021-04-27 RX ORDER — TAMSULOSIN HYDROCHLORIDE 0.4 MG/1
1 CAPSULE ORAL
Qty: 7 | Refills: 0
Start: 2021-04-27 | End: 2021-05-03

## 2021-04-27 RX ORDER — OMEPRAZOLE 10 MG/1
1 CAPSULE, DELAYED RELEASE ORAL
Qty: 0 | Refills: 0 | DISCHARGE

## 2021-04-27 RX ORDER — APIXABAN 2.5 MG/1
5 TABLET, FILM COATED ORAL EVERY 12 HOURS
Refills: 0 | Status: DISCONTINUED | OUTPATIENT
Start: 2021-04-27 | End: 2021-04-27

## 2021-04-27 RX ADMIN — CEFTRIAXONE 1000 MILLIGRAM(S): 500 INJECTION, POWDER, FOR SOLUTION INTRAMUSCULAR; INTRAVENOUS at 09:12

## 2021-04-27 RX ADMIN — APIXABAN 2.5 MILLIGRAM(S): 2.5 TABLET, FILM COATED ORAL at 09:11

## 2021-04-27 RX ADMIN — Medication 100 MILLIGRAM(S): at 09:11

## 2021-04-27 RX ADMIN — Medication 1 TABLET(S): at 09:10

## 2021-04-27 NOTE — DISCHARGE NOTE PROVIDER - NSDCMRMEDTOKEN_GEN_ALL_CORE_FT
atorvastatin 10 mg oral tablet: 1 tab(s) orally once a day  Colace 100 mg oral capsule: 1 cap(s) orally 2 times a day  Eliquis 5 mg oral tablet: 1 tab(s) orally 2 times a day  lisinopril 5 mg oral tablet: 1 tab(s) orally once a day  metoprolol succinate 100 mg oral tablet, extended release: 1 tab(s) orally once a day  omeprazole 20 mg oral delayed release capsule: 1 cap(s) orally once a day  oxycodone-acetaminophen 5 mg-325 mg oral tablet: 1 tab(s) orally every 4 to 6 hours, As Needed for severe pain MDD:6 tablets   Protonix 40 mg oral delayed release tablet: 1 tab(s) orally once a day    atorvastatin 10 mg oral tablet: 1 tab(s) orally once a day  cefuroxime 250 mg oral tablet: 1 tab(s) orally 2 times a day   Colace 100 mg oral capsule: 1 cap(s) orally 2 times a day  Eliquis 5 mg oral tablet: 1 tab(s) orally 2 times a day  Flomax 0.4 mg oral capsule: 1 cap(s) orally once a day   lisinopril 5 mg oral tablet: 1 tab(s) orally once a day  metoprolol succinate 100 mg oral tablet, extended release: 1 tab(s) orally once a day  oxycodone-acetaminophen 5 mg-325 mg oral tablet: 1 tab(s) orally every 4 to 6 hours, As Needed for severe pain MDD:6 tablets   Protonix 40 mg oral delayed release tablet: 1 tab(s) orally once a day   tamsulosin 0.4 mg oral capsule: 1 cap(s) orally once a day (at bedtime)   atorvastatin 10 mg oral tablet: 1 tab(s) orally once a day  cefuroxime 250 mg oral tablet: 1 tab(s) orally 2 times a day   Colace 100 mg oral capsule: 1 cap(s) orally 2 times a day  Eliquis 5 mg oral tablet: 1 tab(s) orally 2 times a day  Flomax 0.4 mg oral capsule: 1 cap(s) orally once a day   lisinopril 5 mg oral tablet: 1 tab(s) orally once a day  metoprolol succinate 100 mg oral tablet, extended release: 1 tab(s) orally once a day  oxycodone-acetaminophen 5 mg-325 mg oral tablet: 1 tab(s) orally every 4 to 6 hours, As Needed for severe pain MDD:6 tablets   Protonix 40 mg oral delayed release tablet: 1 tab(s) orally once a day

## 2021-04-27 NOTE — DISCHARGE NOTE PROVIDER - CARE PROVIDER_API CALL
Valentin Taylor)  Internal Medicine  [address], [address]  [place] [address]  Phone: [phone]  Fax: [phone]  Follow Up Time: 1 week    HUNTER BANERJEE  Urology  [address] [place] [address]  Phone: [phone]  Fax: [phone]  Follow Up Time: 1 week

## 2021-04-27 NOTE — PHARMACOTHERAPY INTERVENTION NOTE - COMMENTS
Recommended increasing Eliquis 2.5 BID to 5 mg BID as patients CRISPIN resolved; patient's home dose was 5 mg for afib.

## 2021-04-27 NOTE — DISCHARGE NOTE PROVIDER - NSDCFUADDINST_GEN_ALL_CORE_FT
Follow up with your primary care provider within 1 week from discharge. Take all discharge paperwork with you to appointment. Continue taking home mediation as prescribed.     If you develop any worsening symptoms, chest pain, shortness of breath, weakness, worsening confusion, unable to eat or drink, or are not urinating, please contact your medical provider immediately or go to your nearest emergency department

## 2021-04-27 NOTE — DISCHARGE NOTE PROVIDER - NSDCFUSCHEDAPPT_GEN_ALL_CORE_FT
KRAIG SCANLON ; 05/04/2021 ; [facility] [address] [facility]  KRAIG SCANLON ; 05/04/2021 ; [facility] [address]

## 2021-04-27 NOTE — DISCHARGE NOTE PROVIDER - INSTRUCTIONS
Drink plenty of fluids to stay hydrated and maintain a low sodium diet with less than 1500 mg per day.

## 2021-04-27 NOTE — DISCHARGE NOTE PROVIDER - PROVIDER TOKENS
PROVIDER:[TOKEN:[[id number]],FOLLOWUP:[1 week]],PROVIDER:[TOKEN:[[id number]],FOLLOWUP:[1 week]]

## 2021-04-27 NOTE — DISCHARGE NOTE PROVIDER - ATTENDING COMMENTS
#Acute urinary retention | Hematuria   -UTI E.Coli sensitive to Rocephin  -S/P Rocephin x4 days. Will d/c on Ceftin 250 mg BID for 3 days   -Crockett leg bag placed with home care to follow   -Urology consult appreciated by Dr. Leal: Follow up with Dr Christensen after completion of abx for TOV  -Continue Flomax HS QD  -Hematuria resolved   -VSS and leukocytosis resolved     #CRISPIN  -Likely pre-renal due to dehydration   -Resolved S/P IVF     #Afib  -Increase eliquis to 5mg BID (home dose) now that CRISPIN resolved.   -Continue Toprol 100 mg QD    # anemia likely from hematuria which has now resolved   recheck CBC in  1week with PMD to check stability    # HTN  -Lisinopril 5mg qd     Discharge home with home care for crockett care. Leg bag placed. Discussed plan of care, discharge instructions, follow up care, and return precautions with patient and patients daughter Ramona. #Acute urinary retention | Hematuria   -UTI E.Coli sensitive to Rocephin  -S/P Rocephin x4 days. Will d/c on Ceftin 250 mg BID for 3 days   -Crockett leg bag placed with home care to follow   -Urology consult appreciated by Dr. Leal: Follow up with Dr Christensen after completion of abx for TOV  -Continue Flomax HS QD  -Hematuria resolved   -VSS and leukocytosis resolved     #CRISPIN  -Likely pre-renal due to dehydration   -Resolved S/P IVF     #Afib  -Increase eliquis to 5mg BID (home dose) now that CRISPIN resolved.   -Continue Toprol 100 mg QD    # anemia likely from hematuria which has now resolved   recheck CBC in  1week with PMD to check stability    # HTN  -Lisinopril 5mg qd     #liver cyst  B/L kidney cysts 4cm and 1.9 cm   these are incidental findings on Ct scan   f/u as outpatient with PMD and urology respectively for outpatient monitoring     Discharge home with home care for crockett care. Leg bag placed. Discussed plan of care, discharge instructions, follow up care, and return precautions with patient and patients daughter Ramona.

## 2021-04-27 NOTE — DISCHARGE NOTE PROVIDER - NSDCCPCAREPLAN_GEN_ALL_CORE_FT
PRINCIPAL DISCHARGE DIAGNOSIS  Diagnosis: UTI (urinary tract infection)  Assessment and Plan of Treatment: You have received 4 days of IV antibiotics (Rocephin) and will continue to take 3 additional days of Ceftin 250 mg two times per day for 3 days which will begin tomorrow.   Urinary Tract Infection  A urinary tract infection (UTI) is an infection of any part of the urinary tract, which includes the kidneys, ureters, bladder, and urethra. Risk factors include ignoring your need to urinate, wiping back to front if female, being an uncircumcised male, and having diabetes or a weak immune system. Symptoms include frequent urination, pain or burning with urination, foul smelling urine, cloudy urine, pain in the lower abdomen, blood in the urine, and fever. If you were prescribed an antibiotic medicine, take it as told by your health care provider. Do not stop taking the antibiotic even if you start to feel better.  SEEK IMMEDIATE MEDICAL CARE IF YOU HAVE ANY OF THE FOLLOWING SYMPTOMS: severe back or abdominal pain, fever, inability to keep fluids or medicine down, dizziness/lightheadedness, or a change in mental status.        SECONDARY DISCHARGE DIAGNOSES  Diagnosis: Urinary retention  Assessment and Plan of Treatment: A crockett catheter has been placed with an attached leg bag. You have been provided a home care reigstered nurse for assistance with care and management.   You have been prescribed flomax 0.4 mg once daily to help with this urinary retention.   Follow up with Dr. Christensen for removal of the crockett catheter to perform a trial of void which will ensure that you are able to urinate on your own once you have completed all your antibiotics.       Diagnosis: Afib  Assessment and Plan of Treatment: Continue taking your eliquis and metoprolol as prescribed     PRINCIPAL DISCHARGE DIAGNOSIS  Diagnosis: UTI (urinary tract infection)  Assessment and Plan of Treatment: You have received 4 days of IV antibiotics (Rocephin) and will continue to take 3 additional days of Ceftin 250 mg two times per day for 3 days which will begin tomorrow.   follow up with Dr plummer in  in 3  days  follow up with primary doctor to recheck complete blood count to check stability of anemia   hemoglobin at time of discharge 12.9   Urinary Tract Infection  A urinary tract infection (UTI) is an infection of any part of the urinary tract, which includes the kidneys, ureters, bladder, and urethra. Risk factors include ignoring your need to urinate, wiping back to front if female, being an uncircumcised male, and having diabetes or a weak immune system. Symptoms include frequent urination, pain or burning with urination, foul smelling urine, cloudy urine, pain in the lower abdomen, blood in the urine, and fever. If you were prescribed an antibiotic medicine, take it as told by your health care provider. Do not stop taking the antibiotic even if you start to feel better.  SEEK IMMEDIATE MEDICAL CARE IF YOU HAVE ANY OF THE FOLLOWING SYMPTOMS: severe back or abdominal pain, fever, inability to keep fluids or medicine down, dizziness/lightheadedness, or a change in mental status.        SECONDARY DISCHARGE DIAGNOSES  Diagnosis: Urinary retention  Assessment and Plan of Treatment: A crockett catheter has been placed with an attached leg bag. You have been provided a home care reigstered nurse for assistance with care and management.   You have been prescribed flomax 0.4 mg once daily to help with this urinary retention.   Follow up with Dr. Plummer for removal of the crockett catheter to perform a trial of void which will ensure that you are able to urinate on your own once you have completed all your antibiotics.       Diagnosis: Afib  Assessment and Plan of Treatment: Continue taking your eliquis and metoprolol as prescribed

## 2021-04-27 NOTE — DISCHARGE NOTE PROVIDER - HOSPITAL COURSE
81 y/o male with PMHx of HTN, HLD, OA, BPH, Afib on Eliquis presents to the ED c/o urinary retention x2 days. Patient reports that he has had similar episode in the past requiring irrigation, does not remember his Urologist. Patient tried to drink fluids for relief but made symptoms worse. Denies any SOB, CP, abd pain, N/V/D, fever or chills; Crockett placed in ED with difficulty, mild hematuria noted.  Of not pt is been taking Elquis for years once a day since a major  bleed; aware this can cause a cva; daughter present. Rene for CRISPIN and home care eval; check US bladder, prostate start Flomax. (24 Apr 2021 15:05)        #Acute urinary retention   -UTI E.Coli sensitive to Rocephin  -S/P Rocephin x4 days. Will d/c on Ceftin 250 mg BID for 3 days   -Crockett leg bag placed with home care to follow   -Urology consult appreciated by Dr. Leal: Follow up with Dr Christensen after completion of abx for TOV  -Continue Flomax HS QD    #CRISPIN  -Likely pre-renal due to dehydration   -Resolved S/P IVF     #Hematuria resolved   mild  c/w Eliquis    # HTN resume BB  neither pt nor daughter has list of meds; Dr Dupree confirms some of the meds    Discharge home with home care for crockett care. Leg bag placed. Discussed plan of care, discharge instructions, follow up care, and return precautions with patient and patients daughter.    81 y/o male with PMHx of HTN, HLD, OA, BPH, Afib on Eliquis presents to the ED c/o urinary retention x2 days. Patient reports that he has had similar episode in the past requiring irrigation, does not remember his Urologist. Patient tried to drink fluids for relief but made symptoms worse. Denies any SOB, CP, abd pain, N/V/D, fever or chills; Crockett placed in ED with difficulty, mild hematuria noted.  Of note pt is been taking Elquis for years once a day since a major  bleed; aware this can cause a cva; daughter present. Patient admitted for CRISPIN/hematuria with urinary retention.     #Acute urinary retention | Hematuria   -UTI E.Coli sensitive to Rocephin  -S/P Rocephin x4 days. Will d/c on Ceftin 250 mg BID for 3 days   -Crockett leg bag placed with home care to follow   -Urology consult appreciated by Dr. Leal: Follow up with Dr Christensen after completion of abx for TOV  -Continue Flomax HS QD  -Hematuria resolved     #CRISPIN  -Likely pre-renal due to dehydration   -Resolved S/P IVF     #Afib  -Increase eliquis to 5mg BID (home dose) now that CRISPIN resolved.   -Continue Toprol 100 mg QD    # HTN  -Lisinopril 5mg qd     Discharge home with home care for crockett care. Leg bag placed. Discussed plan of care, discharge instructions, follow up care, and return precautions with patient and patients daughter.    79 y/o male with PMHx of HTN, HLD, OA, BPH, Afib on Eliquis presents to the ED c/o urinary retention x2 days. Patient reports that he has had similar episode in the past requiring irrigation, does not remember his Urologist. Patient tried to drink fluids for relief but made symptoms worse. Denies any SOB, CP, abd pain, N/V/D, fever or chills; Crockett placed in ED with difficulty, mild hematuria noted.  Of note pt is been taking Elquis for years once a day since a major  bleed; aware this can cause a cva; daughter present. Patient admitted for CRISPIN/hematuria with urinary retention.     #Acute urinary retention | Hematuria   -UTI E.Coli sensitive to Rocephin  -S/P Rocephin x4 days. Will d/c on Ceftin 250 mg BID for 3 days   -Crockett leg bag placed with home care to follow   -Urology consult appreciated by Dr. Leal: Follow up with Dr Christensen after completion of abx for TOV  -Continue Flomax HS QD  -Hematuria resolved   -VSS and leukocytosis resolved     #CRISPIN  -Likely pre-renal due to dehydration   -Resolved S/P IVF     #Afib  -Increase eliquis to 5mg BID (home dose) now that CRISPIN resolved.   -Continue Toprol 100 mg QD    # HTN  -Lisinopril 5mg qd     Discharge home with home care for crockett care. Leg bag placed. Discussed plan of care, discharge instructions, follow up care, and return precautions with patient and patients daughter Ramona.     79 y/o male with PMHx of HTN, HLD, OA, BPH, Afib on Eliquis presents to the ED c/o urinary retention x2 days. Patient reports that he has had similar episode in the past requiring irrigation, does not remember his Urologist. Patient tried to drink fluids for relief but made symptoms worse. Denies any SOB, CP, abd pain, N/V/D, fever or chills; Crockett placed in ED with difficulty, mild hematuria noted.  Of note pt is been taking Elquis for years once a day since a major  bleed; aware this can cause a cva; daughter present. Patient admitted for CRISPIN/hematuria with urinary retention.     #Acute urinary retention |   #Hematuria resolved   -UTI E.Coli sensitive to Rocephin  -S/P Rocephin x4 days. Will d/c on Ceftin 250 mg BID for 3 days   -Crockett leg bag placed with home care to follow   -Urology consult appreciated by Dr. Leal: Follow up with Dr Christensen after completion of abx for TOV  -Continue Flomax HS QD  -Hematuria resolved   -VSS and leukocytosis resolved     #CRISPIN  -Likely pre-renal due to dehydration   -Resolved S/P IVF     # Anemia  supect from hematuria which has now resolved   12.9 hb , recheck CBC in 1 week with PMD    #Afib  continue home dose  eliquis to 5mg BID (home dose) now that CRISPIN resolved.   -Continue Toprol 100 mg QD    # HTN  -Lisinopril 5mg qd     Discharge home with home care for crockett care. Leg bag placed. Discussed plan of care, discharge instructions, follow up care, and return precautions with patient and patients daughter Ramona.

## 2021-04-27 NOTE — DISCHARGE NOTE PROVIDER - NSDCPNSUBOBJ_GEN_ALL_CORE
79 y/o male with PMHx of HTN, HLD, OA, BPH, Afib on Eliquis presents to the ED c/o urinary retention x2 days. Patient reports that he has had similar episode in the past requiring irrigation, does not remember his Urologist. Patient tried to drink fluids for relief but made symptoms worse. Denies any SOB, CP, abd pain, N/V/D, fever or chills; Crockett placed in ED with difficulty, mild hematuria noted.  Of not pt is been taking Elquis for years once a day since a major  bleed; aware this can cause a cva; daughter present. Rene for CRISPIN and home care eval; check US bladder, prostate start Flomax.    4/25-afebrile, crockett draining tea colored urine , denies abd pain, nausea or vomiting , had 2 loose stools  4/26 hematuria resolved, no new complaints, no diarrhea   4/27 No overnight events: Denies any complaints at this time.     Vital Signs Last 24 Hrs  T(C): 36.8 (27 Apr 2021 08:00), Max: 36.8 (26 Apr 2021 21:10)  T(F): 98.2 (27 Apr 2021 08:00), Max: 98.3 (26 Apr 2021 21:10)  HR: 64 (27 Apr 2021 08:00) (64 - 73)  BP: 148/87 (27 Apr 2021 08:00) (133/74 - 148/87)  BP(mean): --  RR: 18 (27 Apr 2021 08:00) (18 - 18)  SpO2: 96% (27 Apr 2021 08:00) (94% - 99%)    CONSTITUTIONAL: Well appearing, awake, alert, oriented to person, place, time/situation and in no apparent distress.  ENMT: Airway patent, Nasal mucosa clear. . Throat has no vesicles, no oropharyngeal exudates and uvula is midline. +dry mucous membranes  EYES: Clear bilaterally, pupils equal, round and reactive to light.  CARDIAC: Normal rate, irregularly irregular rhythm. Heart sounds S1, S2.  +murmur  RESPIRATORY: Breath sounds clear and equal bilaterally.  GASTROINTESTINAL: Abdomen soft, non-tender, no guarding.  GENITOURINARY: Crockett hematuria  MUSCULOSKELETAL: Spine appears normal, range of motion is not limited, no muscle or joint tenderness; chronic derm changes lower extr  NEUROLOGICAL: Alert and oriented, no focal deficits, no motor or sensory deficits.    Labs:                         12.9   6.43  )-----------( 147      ( 26 Apr 2021 06:04 )             39.6   04-26    139  |  106  |  22  ----------------------------<  86  3.4<L>   |  27  |  0.89    Ca    9.1      26 Apr 2021 06:04    Radiology reviewed:   CT Abdomen and Pelvis No Cont (04.24.21 @ 15:10) >  IMPRESSION:  No evidence of hydronephrosis or hydroureter. Crockett catheter in place within a collapsed bladder.  Small fat-containing periumbilical hernia.        Xray Chest 1 View AP/PA. (04.24.21 @ 15:20) >  IMPRESSION: The cardiac silhouette is enlarged. There is opacification of the left lung base compatible with atelectasis and a small left pleural effusion. The right lung is clear. The bones are intact.      Assessment and Plan:   * See Hospital course

## 2021-04-28 RX ORDER — CEFUROXIME AXETIL 250 MG
1 TABLET ORAL
Qty: 6 | Refills: 0
Start: 2021-04-28 | End: 2021-04-30

## 2021-04-29 LAB
CULTURE RESULTS: SIGNIFICANT CHANGE UP
CULTURE RESULTS: SIGNIFICANT CHANGE UP
SPECIMEN SOURCE: SIGNIFICANT CHANGE UP
SPECIMEN SOURCE: SIGNIFICANT CHANGE UP

## 2021-05-04 ENCOUNTER — APPOINTMENT (OUTPATIENT)
Dept: UROLOGY | Facility: CLINIC | Age: 81
End: 2021-05-04
Payer: MEDICARE

## 2021-05-04 VITALS
HEIGHT: 72 IN | HEART RATE: 82 BPM | OXYGEN SATURATION: 97 % | WEIGHT: 242 LBS | BODY MASS INDEX: 32.78 KG/M2 | SYSTOLIC BLOOD PRESSURE: 135 MMHG | DIASTOLIC BLOOD PRESSURE: 79 MMHG

## 2021-05-04 DIAGNOSIS — Z78.9 OTHER SPECIFIED HEALTH STATUS: ICD-10-CM

## 2021-05-04 DIAGNOSIS — Z72.0 TOBACCO USE: ICD-10-CM

## 2021-05-04 DIAGNOSIS — N17.9 ACUTE KIDNEY FAILURE, UNSPECIFIED: ICD-10-CM

## 2021-05-04 DIAGNOSIS — Z72.89 OTHER PROBLEMS RELATED TO LIFESTYLE: ICD-10-CM

## 2021-05-04 DIAGNOSIS — Z80.8 FAMILY HISTORY OF MALIGNANT NEOPLASM OF OTHER ORGANS OR SYSTEMS: ICD-10-CM

## 2021-05-04 DIAGNOSIS — Z98.890 OTHER SPECIFIED POSTPROCEDURAL STATES: ICD-10-CM

## 2021-05-04 DIAGNOSIS — E86.0 DEHYDRATION: ICD-10-CM

## 2021-05-04 DIAGNOSIS — Z79.01 LONG TERM (CURRENT) USE OF ANTICOAGULANTS: ICD-10-CM

## 2021-05-04 DIAGNOSIS — B96.20 UNSPECIFIED ESCHERICHIA COLI [E. COLI] AS THE CAUSE OF DISEASES CLASSIFIED ELSEWHERE: ICD-10-CM

## 2021-05-04 DIAGNOSIS — M19.90 UNSPECIFIED OSTEOARTHRITIS, UNSPECIFIED SITE: ICD-10-CM

## 2021-05-04 DIAGNOSIS — E66.9 OBESITY, UNSPECIFIED: ICD-10-CM

## 2021-05-04 DIAGNOSIS — E78.5 HYPERLIPIDEMIA, UNSPECIFIED: ICD-10-CM

## 2021-05-04 DIAGNOSIS — I48.91 UNSPECIFIED ATRIAL FIBRILLATION: ICD-10-CM

## 2021-05-04 DIAGNOSIS — N39.0 URINARY TRACT INFECTION, SITE NOT SPECIFIED: ICD-10-CM

## 2021-05-04 DIAGNOSIS — N40.1 BENIGN PROSTATIC HYPERPLASIA WITH LOWER URINARY TRACT SYMPTOMS: ICD-10-CM

## 2021-05-04 DIAGNOSIS — D64.9 ANEMIA, UNSPECIFIED: ICD-10-CM

## 2021-05-04 DIAGNOSIS — R33.9 RETENTION OF URINE, UNSPECIFIED: ICD-10-CM

## 2021-05-04 DIAGNOSIS — I10 ESSENTIAL (PRIMARY) HYPERTENSION: ICD-10-CM

## 2021-05-04 DIAGNOSIS — H91.90 UNSPECIFIED HEARING LOSS, UNSPECIFIED EAR: ICD-10-CM

## 2021-05-04 DIAGNOSIS — N28.1 CYST OF KIDNEY, ACQUIRED: ICD-10-CM

## 2021-05-04 DIAGNOSIS — R31.9 HEMATURIA, UNSPECIFIED: ICD-10-CM

## 2021-05-04 DIAGNOSIS — H54.7 UNSPECIFIED VISUAL LOSS: ICD-10-CM

## 2021-05-04 PROCEDURE — 99203 OFFICE O/P NEW LOW 30 MIN: CPT

## 2021-05-04 RX ORDER — TAMSULOSIN HYDROCHLORIDE 0.4 MG/1
0.4 CAPSULE ORAL
Qty: 60 | Refills: 11 | Status: ACTIVE | COMMUNITY
Start: 2021-05-04 | End: 1900-01-01

## 2021-05-04 NOTE — END OF VISIT
[FreeTextEntry3] : He will start bethanechol 50 mg twice daily and tamsulosin twice daily and he will have a cystometric evaluation in 1 week.

## 2021-05-04 NOTE — PHYSICAL EXAM
[General Appearance - Well Developed] : well developed [General Appearance - Well Nourished] : well nourished [Normal Appearance] : normal appearance [Well Groomed] : well groomed [General Appearance - In No Acute Distress] : no acute distress [Edema] : no peripheral edema [Respiration, Rhythm And Depth] : normal respiratory rhythm and effort [Exaggerated Use Of Accessory Muscles For Inspiration] : no accessory muscle use [Abdomen Soft] : soft [Abdomen Tenderness] : non-tender [Costovertebral Angle Tenderness] : no ~M costovertebral angle tenderness [Urethral Meatus] : meatus normal [Urinary Bladder Findings] : the bladder was normal on palpation [Scrotum] : the scrotum was normal [Testes Mass (___cm)] : there were no testicular masses [No Prostate Nodules] : no prostate nodules [FreeTextEntry1] : Moderate in size and palpably beingn [Normal Station and Gait] : the gait and station were normal for the patient's age [] : no rash [No Focal Deficits] : no focal deficits [Oriented To Time, Place, And Person] : oriented to person, place, and time [Affect] : the affect was normal [Mood] : the mood was normal [Not Anxious] : not anxious [No Palpable Adenopathy] : no palpable adenopathy

## 2021-05-04 NOTE — HISTORY OF PRESENT ILLNESS
[FreeTextEntry1] : This patient states that he had lower tract symptoms for some period of time and recently ended up in the emergency room where cath was placed for a large postvoid residual

## 2021-05-04 NOTE — REVIEW OF SYSTEMS
[Eyesight Problems] : eyesight problems [Urine retention] : urine retention [see HPI] : see HPI [Negative] : Cardiovascular [FreeTextEntry2] : High blood pressure

## 2021-05-17 ENCOUNTER — APPOINTMENT (OUTPATIENT)
Dept: UROLOGY | Facility: CLINIC | Age: 81
End: 2021-05-17
Payer: MEDICARE

## 2021-05-17 ENCOUNTER — APPOINTMENT (OUTPATIENT)
Dept: UROLOGY | Facility: CLINIC | Age: 81
End: 2021-05-17

## 2021-05-17 VITALS
DIASTOLIC BLOOD PRESSURE: 86 MMHG | OXYGEN SATURATION: 96 % | TEMPERATURE: 97 F | HEART RATE: 86 BPM | SYSTOLIC BLOOD PRESSURE: 130 MMHG

## 2021-05-17 PROCEDURE — 51797 INTRAABDOMINAL PRESSURE TEST: CPT

## 2021-05-17 PROCEDURE — 51728 CYSTOMETROGRAM W/VP: CPT

## 2021-05-17 PROCEDURE — 51784 ANAL/URINARY MUSCLE STUDY: CPT

## 2021-05-27 NOTE — ASU PREOP CHECKLIST - MUPIRONCIN END DATE
Lesly Medina MD  Signing Provider: MD BRENDAN Christensen/HUA ([id number])  DD: 05/26/2021 10:44:50 TD: 05/27/2021 07:19:37    Copy Sent To:    12-Jul-2019 09:03

## 2021-06-01 ENCOUNTER — APPOINTMENT (OUTPATIENT)
Dept: UROLOGY | Facility: CLINIC | Age: 81
End: 2021-06-01
Payer: MEDICARE

## 2021-06-01 PROCEDURE — 76872 US TRANSRECTAL: CPT

## 2021-06-09 ENCOUNTER — APPOINTMENT (OUTPATIENT)
Dept: UROLOGY | Facility: CLINIC | Age: 81
End: 2021-06-09
Payer: MEDICARE

## 2021-06-09 VITALS
BODY MASS INDEX: 31.42 KG/M2 | DIASTOLIC BLOOD PRESSURE: 70 MMHG | HEIGHT: 72 IN | OXYGEN SATURATION: 96 % | SYSTOLIC BLOOD PRESSURE: 115 MMHG | HEART RATE: 74 BPM | WEIGHT: 232 LBS

## 2021-06-09 PROCEDURE — 52000 CYSTOURETHROSCOPY: CPT

## 2021-07-06 ENCOUNTER — APPOINTMENT (OUTPATIENT)
Dept: UROLOGY | Facility: CLINIC | Age: 81
End: 2021-07-06
Payer: MEDICARE

## 2021-07-06 VITALS
OXYGEN SATURATION: 98 % | HEIGHT: 72 IN | DIASTOLIC BLOOD PRESSURE: 74 MMHG | TEMPERATURE: 97.3 F | WEIGHT: 230 LBS | SYSTOLIC BLOOD PRESSURE: 106 MMHG | HEART RATE: 73 BPM | BODY MASS INDEX: 31.15 KG/M2

## 2021-07-06 PROCEDURE — 51702 INSERT TEMP BLADDER CATH: CPT

## 2021-07-24 ENCOUNTER — INPATIENT (INPATIENT)
Facility: HOSPITAL | Age: 81
LOS: 0 days | Discharge: HOME | DRG: 700 | End: 2021-07-25
Attending: HOSPITALIST | Admitting: INTERNAL MEDICINE
Payer: MEDICARE

## 2021-07-24 VITALS — WEIGHT: 235.01 LBS | HEIGHT: 72 IN

## 2021-07-24 DIAGNOSIS — Z98.890 OTHER SPECIFIED POSTPROCEDURAL STATES: Chronic | ICD-10-CM

## 2021-07-24 LAB
ANION GAP SERPL CALC-SCNC: 5 MMOL/L — SIGNIFICANT CHANGE UP (ref 5–17)
APTT BLD: 36.7 SEC — HIGH (ref 27.5–35.5)
BASOPHILS # BLD AUTO: 0.03 K/UL — SIGNIFICANT CHANGE UP (ref 0–0.2)
BASOPHILS NFR BLD AUTO: 0.4 % — SIGNIFICANT CHANGE UP (ref 0–2)
BUN SERPL-MCNC: 14 MG/DL — SIGNIFICANT CHANGE UP (ref 7–23)
CALCIUM SERPL-MCNC: 8.9 MG/DL — SIGNIFICANT CHANGE UP (ref 8.5–10.1)
CHLORIDE SERPL-SCNC: 110 MMOL/L — HIGH (ref 96–108)
CO2 SERPL-SCNC: 24 MMOL/L — SIGNIFICANT CHANGE UP (ref 22–31)
CREAT SERPL-MCNC: 0.91 MG/DL — SIGNIFICANT CHANGE UP (ref 0.5–1.3)
EOSINOPHIL # BLD AUTO: 0.12 K/UL — SIGNIFICANT CHANGE UP (ref 0–0.5)
EOSINOPHIL NFR BLD AUTO: 1.7 % — SIGNIFICANT CHANGE UP (ref 0–6)
GLUCOSE SERPL-MCNC: 87 MG/DL — SIGNIFICANT CHANGE UP (ref 70–99)
HCT VFR BLD CALC: 42.7 % — SIGNIFICANT CHANGE UP (ref 39–50)
HGB BLD-MCNC: 13.8 G/DL — SIGNIFICANT CHANGE UP (ref 13–17)
IMM GRANULOCYTES NFR BLD AUTO: 0.3 % — SIGNIFICANT CHANGE UP (ref 0–1.5)
INR BLD: 1.37 RATIO — HIGH (ref 0.88–1.16)
LYMPHOCYTES # BLD AUTO: 1.37 K/UL — SIGNIFICANT CHANGE UP (ref 1–3.3)
LYMPHOCYTES # BLD AUTO: 19.5 % — SIGNIFICANT CHANGE UP (ref 13–44)
MCHC RBC-ENTMCNC: 31.6 PG — SIGNIFICANT CHANGE UP (ref 27–34)
MCHC RBC-ENTMCNC: 32.3 GM/DL — SIGNIFICANT CHANGE UP (ref 32–36)
MCV RBC AUTO: 97.7 FL — SIGNIFICANT CHANGE UP (ref 80–100)
MONOCYTES # BLD AUTO: 0.61 K/UL — SIGNIFICANT CHANGE UP (ref 0–0.9)
MONOCYTES NFR BLD AUTO: 8.7 % — SIGNIFICANT CHANGE UP (ref 2–14)
NEUTROPHILS # BLD AUTO: 4.86 K/UL — SIGNIFICANT CHANGE UP (ref 1.8–7.4)
NEUTROPHILS NFR BLD AUTO: 69.4 % — SIGNIFICANT CHANGE UP (ref 43–77)
PLATELET # BLD AUTO: 200 K/UL — SIGNIFICANT CHANGE UP (ref 150–400)
POTASSIUM SERPL-MCNC: 4 MMOL/L — SIGNIFICANT CHANGE UP (ref 3.5–5.3)
POTASSIUM SERPL-SCNC: 4 MMOL/L — SIGNIFICANT CHANGE UP (ref 3.5–5.3)
PROTHROM AB SERPL-ACNC: 15.7 SEC — HIGH (ref 10.6–13.6)
RBC # BLD: 4.37 M/UL — SIGNIFICANT CHANGE UP (ref 4.2–5.8)
RBC # FLD: 14.7 % — HIGH (ref 10.3–14.5)
SODIUM SERPL-SCNC: 139 MMOL/L — SIGNIFICANT CHANGE UP (ref 135–145)
TROPONIN I SERPL-MCNC: <0.015 NG/ML — SIGNIFICANT CHANGE UP (ref 0.01–0.04)
WBC # BLD: 7.01 K/UL — SIGNIFICANT CHANGE UP (ref 3.8–10.5)
WBC # FLD AUTO: 7.01 K/UL — SIGNIFICANT CHANGE UP (ref 3.8–10.5)

## 2021-07-24 PROCEDURE — 99285 EMERGENCY DEPT VISIT HI MDM: CPT

## 2021-07-24 RX ORDER — SODIUM CHLORIDE 9 MG/ML
1000 INJECTION INTRAMUSCULAR; INTRAVENOUS; SUBCUTANEOUS ONCE
Refills: 0 | Status: COMPLETED | OUTPATIENT
Start: 2021-07-24 | End: 2021-07-24

## 2021-07-24 RX ORDER — CEFTRIAXONE 500 MG/1
1000 INJECTION, POWDER, FOR SOLUTION INTRAMUSCULAR; INTRAVENOUS ONCE
Refills: 0 | Status: DISCONTINUED | OUTPATIENT
Start: 2021-07-24 | End: 2021-07-24

## 2021-07-24 RX ORDER — CEFTRIAXONE 500 MG/1
1000 INJECTION, POWDER, FOR SOLUTION INTRAMUSCULAR; INTRAVENOUS ONCE
Refills: 0 | Status: COMPLETED | OUTPATIENT
Start: 2021-07-24 | End: 2021-07-24

## 2021-07-24 RX ORDER — OXYCODONE AND ACETAMINOPHEN 5; 325 MG/1; MG/1
1 TABLET ORAL ONCE
Refills: 0 | Status: DISCONTINUED | OUTPATIENT
Start: 2021-07-24 | End: 2021-07-24

## 2021-07-24 RX ADMIN — OXYCODONE AND ACETAMINOPHEN 1 TABLET(S): 5; 325 TABLET ORAL at 22:31

## 2021-07-24 RX ADMIN — CEFTRIAXONE 1000 MILLIGRAM(S): 500 INJECTION, POWDER, FOR SOLUTION INTRAMUSCULAR; INTRAVENOUS at 23:18

## 2021-07-24 RX ADMIN — SODIUM CHLORIDE 2000 MILLILITER(S): 9 INJECTION INTRAMUSCULAR; INTRAVENOUS; SUBCUTANEOUS at 21:06

## 2021-07-24 RX ADMIN — OXYCODONE AND ACETAMINOPHEN 1 TABLET(S): 5; 325 TABLET ORAL at 21:07

## 2021-07-24 NOTE — ED ADULT NURSE NOTE - NSIMPLEMENTINTERV_GEN_ALL_ED
Implemented All Fall with Harm Risk Interventions:  [place] to call system. Call bell, personal items and telephone within reach. Instruct patient to call for assistance. Room bathroom lighting operational. Non-slip footwear when patient is off stretcher. Physically safe environment: no spills, clutter or unnecessary equipment. Stretcher in lowest position, wheels locked, appropriate side rails in place. Provide visual cue, wrist band, yellow gown, etc. Monitor gait and stability. Monitor for mental status changes and reorient to person, place, and time. Review medications for side effects contributing to fall risk. Reinforce activity limits and safety measures with patient and family. Provide visual clues: red socks.

## 2021-07-24 NOTE — ED ADULT TRIAGE NOTE - CHIEF COMPLAINT QUOTE
Patient ambulatory into ED co catheter complications. Pt with indwelling urinary cathter, not draining since today at 1400. Catheter was placed by urologist on 7/8/21, due to be changed on 7/28/2021. Pt endorses a full feeling, increased pain in lower abdomen. No burning or hematuria as per patient.

## 2021-07-24 NOTE — ED STATDOCS - CONSTITUTIONAL, MLM
normal... well appearing and in no apparent distress. well appearing and in distress unable to void with  blocked crockett cath

## 2021-07-24 NOTE — ED STATDOCS - OBJECTIVE STATEMENT
79 y/o male with PMHx of HTN, HLD, OA, BPH, Afib on Eliquis presents to the ED c/o indwelling urinary catheter complication. Pt reports catheter is not draining since 1400 today. Catheter was placed by urologist on 07/08/21 and is due to changed on 7/28/2021.  Pt endorses a full feeling, increased pain in lower abdomen. No hematuria as per patient.

## 2021-07-24 NOTE — ED ADULT NURSE NOTE - OBJECTIVE STATEMENT
81 y/o a&0 Patient ambulatory into ED with co of catheter complications. Pt with indwelling urinary cathter, not draining since today at 1400. Catheter was placed by urologist on 7/8/21, due to be changed on 7/28/2021. Pt endorses a full feeling, increased pain in lower abdomen. No burning or hematuria as per patient. abd does not appear to be distended at this time.

## 2021-07-24 NOTE — ED STATDOCS - CARE PLAN
Principal Discharge DX:	Hematuria, gross   Principal Discharge DX:	Hematuria, gross  Secondary Diagnosis:	Urinary retention

## 2021-07-24 NOTE — ED STATDOCS - ENMT, MLM
Nasal mucosa clear.  Mouth with normal mucosa  Throat has no vesicles, no oropharyngeal exudates and uvula is midline. Nasal mucosa clear.

## 2021-07-24 NOTE — ED STATDOCS - ATTENDING CONTRIBUTION TO CARE
Dr. Lentz: I performed a face to face bedside interview with patient regarding history of present illness, review of symptoms and past medical history. I completed an independent physical exam.  I have discussed patient's plan of care with PA.   I agree with note as stated above, having amended the EMR as needed to reflect my findings.   This includes HISTORY OF PRESENT ILLNESS, HIV, PAST MEDICAL/SURGICAL/FAMILY/SOCIAL HISTORY, ALLERGIES AND HOME MEDICATIONS, REVIEW OF SYSTEMS, PHYSICAL EXAM, and any PROGRESS NOTES during the time I functioned as the attending physician for this patient.

## 2021-07-24 NOTE — ED ADULT NURSE NOTE - NS ED NURSE REPORT GIVEN TO FT
[Good] : ~his/her~  mood as  good [] : Yes [No] : No [0] : 2) Feeling down, depressed, or hopeless: Not at all (0) [With Significant Other] : lives with significant other [] :  [Employed] : employed [Sexually Active] : sexually active [Reports changes in hearing] : Reports no changes in hearing [Reports changes in vision] : Reports no changes in vision [Reports changes in dental health] : Reports no changes in dental health [FreeTextEntry2] :  [de-identified] : does not see dentist rn

## 2021-07-24 NOTE — ED STATDOCS - PROGRESS NOTE DETAILS
called to pt bedside for no urine drainaing from catheter, pocus shows distended urinary bladder with no evidence of crockett balloon in bladder, removed for large amount of blood with clot at meatus, cude cath used and inserted with return of urine, blood from meatus slowed after cath insertion, will get labs, including inr and observe to make certain bleeding stops FLORES Lentz DO Patient seen and evaluated, ED attending note and orders reviewed, will continue with patient follow up and care -Ruben Cartwright PA-C Discussed with Dr. Leal, imagining not indicated at this time, treatment for traumatic crockett placement is to place the crockett in the bladder. If clotting and hematuria can try cbi. If there is an issue with the crockett draining he will come in to evaluate. -Ruben Cartwright PA-C urine clearing, less blood. will observe for now, explained topt and family at bedside FLORES Lentz DO pt with hematuria with clots will place 3 way crockett for cbi. dulce Lentz DO spoke with Dr. Sotomayor hosptialist dulce Lentz DO cbi inserted by rn, cbi flows in but does not drain out, pocus unable to identify foely will send for stat ct abd for exact location of crockett palma Lentz DO radiology called for results of ct B Mary Carmen LYNCH spoke to ct tech to get a read on ct, he eloy "chat" with reading radilogist FLORES Lentz DO

## 2021-07-25 ENCOUNTER — TRANSCRIPTION ENCOUNTER (OUTPATIENT)
Age: 81
End: 2021-07-25

## 2021-07-25 VITALS
HEART RATE: 77 BPM | OXYGEN SATURATION: 98 % | RESPIRATION RATE: 14 BRPM | WEIGHT: 286.16 LBS | TEMPERATURE: 99 F | SYSTOLIC BLOOD PRESSURE: 154 MMHG | DIASTOLIC BLOOD PRESSURE: 87 MMHG

## 2021-07-25 DIAGNOSIS — R31.0 GROSS HEMATURIA: ICD-10-CM

## 2021-07-25 LAB
ALBUMIN SERPL ELPH-MCNC: 2.9 G/DL — LOW (ref 3.3–5)
ALP SERPL-CCNC: 79 U/L — SIGNIFICANT CHANGE UP (ref 40–120)
ALT FLD-CCNC: 15 U/L — SIGNIFICANT CHANGE UP (ref 12–78)
ANION GAP SERPL CALC-SCNC: 2 MMOL/L — LOW (ref 5–17)
APPEARANCE UR: ABNORMAL
APTT BLD: 34.9 SEC — SIGNIFICANT CHANGE UP (ref 27.5–35.5)
AST SERPL-CCNC: 8 U/L — LOW (ref 15–37)
BASOPHILS # BLD AUTO: 0.03 K/UL — SIGNIFICANT CHANGE UP (ref 0–0.2)
BASOPHILS NFR BLD AUTO: 0.6 % — SIGNIFICANT CHANGE UP (ref 0–2)
BILIRUB SERPL-MCNC: 0.9 MG/DL — SIGNIFICANT CHANGE UP (ref 0.2–1.2)
BILIRUB UR-MCNC: NEGATIVE — SIGNIFICANT CHANGE UP
BUN SERPL-MCNC: 13 MG/DL — SIGNIFICANT CHANGE UP (ref 7–23)
CALCIUM SERPL-MCNC: 8.4 MG/DL — LOW (ref 8.5–10.1)
CHLORIDE SERPL-SCNC: 113 MMOL/L — HIGH (ref 96–108)
CO2 SERPL-SCNC: 28 MMOL/L — SIGNIFICANT CHANGE UP (ref 22–31)
COLOR SPEC: ABNORMAL
COVID-19 SPIKE DOMAIN AB INTERP: POSITIVE
COVID-19 SPIKE DOMAIN ANTIBODY RESULT: >250 U/ML — HIGH
CREAT SERPL-MCNC: 0.88 MG/DL — SIGNIFICANT CHANGE UP (ref 0.5–1.3)
DIFF PNL FLD: ABNORMAL
EOSINOPHIL # BLD AUTO: 0.16 K/UL — SIGNIFICANT CHANGE UP (ref 0–0.5)
EOSINOPHIL NFR BLD AUTO: 3.2 % — SIGNIFICANT CHANGE UP (ref 0–6)
GLUCOSE SERPL-MCNC: 94 MG/DL — SIGNIFICANT CHANGE UP (ref 70–99)
GLUCOSE UR QL: NEGATIVE MG/DL — SIGNIFICANT CHANGE UP
HCT VFR BLD CALC: 41.9 % — SIGNIFICANT CHANGE UP (ref 39–50)
HGB BLD-MCNC: 13.2 G/DL — SIGNIFICANT CHANGE UP (ref 13–17)
IMM GRANULOCYTES NFR BLD AUTO: 0.2 % — SIGNIFICANT CHANGE UP (ref 0–1.5)
INR BLD: 1.26 RATIO — HIGH (ref 0.88–1.16)
KETONES UR-MCNC: ABNORMAL
LEUKOCYTE ESTERASE UR-ACNC: NEGATIVE — SIGNIFICANT CHANGE UP
LYMPHOCYTES # BLD AUTO: 1.23 K/UL — SIGNIFICANT CHANGE UP (ref 1–3.3)
LYMPHOCYTES # BLD AUTO: 24.9 % — SIGNIFICANT CHANGE UP (ref 13–44)
MCHC RBC-ENTMCNC: 31.5 GM/DL — LOW (ref 32–36)
MCHC RBC-ENTMCNC: 31.7 PG — SIGNIFICANT CHANGE UP (ref 27–34)
MCV RBC AUTO: 100.5 FL — HIGH (ref 80–100)
MONOCYTES # BLD AUTO: 0.51 K/UL — SIGNIFICANT CHANGE UP (ref 0–0.9)
MONOCYTES NFR BLD AUTO: 10.3 % — SIGNIFICANT CHANGE UP (ref 2–14)
NEUTROPHILS # BLD AUTO: 2.99 K/UL — SIGNIFICANT CHANGE UP (ref 1.8–7.4)
NEUTROPHILS NFR BLD AUTO: 60.8 % — SIGNIFICANT CHANGE UP (ref 43–77)
NITRITE UR-MCNC: NEGATIVE — SIGNIFICANT CHANGE UP
PH UR: 7 — SIGNIFICANT CHANGE UP (ref 5–8)
PLATELET # BLD AUTO: 165 K/UL — SIGNIFICANT CHANGE UP (ref 150–400)
POTASSIUM SERPL-MCNC: 4.4 MMOL/L — SIGNIFICANT CHANGE UP (ref 3.5–5.3)
POTASSIUM SERPL-SCNC: 4.4 MMOL/L — SIGNIFICANT CHANGE UP (ref 3.5–5.3)
PROT SERPL-MCNC: 6 GM/DL — SIGNIFICANT CHANGE UP (ref 6–8.3)
PROT UR-MCNC: 500 MG/DL
PROTHROM AB SERPL-ACNC: 14.6 SEC — HIGH (ref 10.6–13.6)
RBC # BLD: 4.17 M/UL — LOW (ref 4.2–5.8)
RBC # FLD: 14.6 % — HIGH (ref 10.3–14.5)
SARS-COV-2 IGG+IGM SERPL QL IA: >250 U/ML — HIGH
SARS-COV-2 IGG+IGM SERPL QL IA: POSITIVE
SODIUM SERPL-SCNC: 143 MMOL/L — SIGNIFICANT CHANGE UP (ref 135–145)
SP GR SPEC: 1.01 — SIGNIFICANT CHANGE UP (ref 1.01–1.02)
UROBILINOGEN FLD QL: NEGATIVE MG/DL — SIGNIFICANT CHANGE UP
WBC # BLD: 4.93 K/UL — SIGNIFICANT CHANGE UP (ref 3.8–10.5)
WBC # FLD AUTO: 4.93 K/UL — SIGNIFICANT CHANGE UP (ref 3.8–10.5)

## 2021-07-25 PROCEDURE — 74176 CT ABD & PELVIS W/O CONTRAST: CPT | Mod: 26

## 2021-07-25 PROCEDURE — 85730 THROMBOPLASTIN TIME PARTIAL: CPT

## 2021-07-25 PROCEDURE — 80053 COMPREHEN METABOLIC PANEL: CPT

## 2021-07-25 PROCEDURE — U0005: CPT

## 2021-07-25 PROCEDURE — 93005 ELECTROCARDIOGRAM TRACING: CPT

## 2021-07-25 PROCEDURE — 74176 CT ABD & PELVIS W/O CONTRAST: CPT

## 2021-07-25 PROCEDURE — 99223 1ST HOSP IP/OBS HIGH 75: CPT

## 2021-07-25 PROCEDURE — 93010 ELECTROCARDIOGRAM REPORT: CPT

## 2021-07-25 PROCEDURE — 85025 COMPLETE CBC W/AUTO DIFF WBC: CPT

## 2021-07-25 PROCEDURE — 36415 COLL VENOUS BLD VENIPUNCTURE: CPT

## 2021-07-25 PROCEDURE — U0003: CPT

## 2021-07-25 PROCEDURE — 99221 1ST HOSP IP/OBS SF/LOW 40: CPT

## 2021-07-25 PROCEDURE — 86769 SARS-COV-2 COVID-19 ANTIBODY: CPT

## 2021-07-25 PROCEDURE — 12345: CPT | Mod: NC

## 2021-07-25 PROCEDURE — 85610 PROTHROMBIN TIME: CPT

## 2021-07-25 RX ORDER — ATORVASTATIN CALCIUM 80 MG/1
10 TABLET, FILM COATED ORAL AT BEDTIME
Refills: 0 | Status: DISCONTINUED | OUTPATIENT
Start: 2021-07-25 | End: 2021-07-25

## 2021-07-25 RX ORDER — METOPROLOL TARTRATE 50 MG
100 TABLET ORAL DAILY
Refills: 0 | Status: DISCONTINUED | OUTPATIENT
Start: 2021-07-25 | End: 2021-07-25

## 2021-07-25 RX ORDER — MORPHINE SULFATE 50 MG/1
4 CAPSULE, EXTENDED RELEASE ORAL ONCE
Refills: 0 | Status: DISCONTINUED | OUTPATIENT
Start: 2021-07-25 | End: 2021-07-25

## 2021-07-25 RX ORDER — ATORVASTATIN CALCIUM 80 MG/1
1 TABLET, FILM COATED ORAL
Qty: 0 | Refills: 0 | DISCHARGE

## 2021-07-25 RX ORDER — METOPROLOL TARTRATE 50 MG
1 TABLET ORAL
Qty: 0 | Refills: 0 | DISCHARGE

## 2021-07-25 RX ORDER — PANTOPRAZOLE SODIUM 20 MG/1
40 TABLET, DELAYED RELEASE ORAL
Refills: 0 | Status: DISCONTINUED | OUTPATIENT
Start: 2021-07-25 | End: 2021-07-25

## 2021-07-25 RX ORDER — TAMSULOSIN HYDROCHLORIDE 0.4 MG/1
0.4 CAPSULE ORAL AT BEDTIME
Refills: 0 | Status: DISCONTINUED | OUTPATIENT
Start: 2021-07-25 | End: 2021-07-25

## 2021-07-25 RX ORDER — ACETAMINOPHEN 500 MG
650 TABLET ORAL EVERY 6 HOURS
Refills: 0 | Status: DISCONTINUED | OUTPATIENT
Start: 2021-07-25 | End: 2021-07-25

## 2021-07-25 RX ORDER — LISINOPRIL 2.5 MG/1
5 TABLET ORAL DAILY
Refills: 0 | Status: DISCONTINUED | OUTPATIENT
Start: 2021-07-25 | End: 2021-07-25

## 2021-07-25 RX ORDER — LISINOPRIL 2.5 MG/1
1 TABLET ORAL
Qty: 0 | Refills: 0 | DISCHARGE

## 2021-07-25 RX ADMIN — MORPHINE SULFATE 4 MILLIGRAM(S): 50 CAPSULE, EXTENDED RELEASE ORAL at 01:28

## 2021-07-25 RX ADMIN — MORPHINE SULFATE 4 MILLIGRAM(S): 50 CAPSULE, EXTENDED RELEASE ORAL at 02:00

## 2021-07-25 RX ADMIN — Medication 100 MILLIGRAM(S): at 10:12

## 2021-07-25 RX ADMIN — LISINOPRIL 5 MILLIGRAM(S): 2.5 TABLET ORAL at 13:34

## 2021-07-25 NOTE — CONSULT NOTE ADULT - SUBJECTIVE AND OBJECTIVE BOX
79 y/o M w PMH of HTN, dyslipidemia, OA, BPH, A-fib (on eliquis), p/w crockett not draining properly. Patient had crockett placed on 7/8/21 for urinary retention. Patient states that on Friday he noticed some hematuria and the crockett wasn't draining properly on and off. Yesterday the crockett stopped draining again and he had abdominal discomfort with urinary retention. States that after crockett was replaced in ED, he started having hematuria again. Patient denies abdominal discomfort at this time. Denies nausea, vomiting, fever, chills, cough, runny nose, sore throat, CP, SOB    7/25/2021: urology consulted for gross hematuria. Catheter was exchanged in ED and pt was started on CBI by ED. CBI clear overnight, clear this AM on moderate drip. CBI clamped and hour later was draining yellow urine.     PSH: Denies     Social Hx: Former smoker, etoh - 1 drink per day, drugs - denies     Family Hx: Denies     Review of Systems:  Other Review of Systems: All other review of systems negative, except as noted in HPI      Allergies and Intolerances:        Allergies:  	No Known Allergies:     Home Medications:   * Patient Currently Takes Medications as of 25-Jul-2021 04:11 documented in Structured Notes  · 	Flomax 0.4 mg oral capsule: Last Dose Taken:  , 1 cap(s) orally once a day   · 	Protonix 40 mg oral delayed release tablet: Last Dose Taken:  , 1 tab(s) orally once a day   · 	metoprolol succinate 100 mg oral tablet, extended release: Last Dose Taken:  , 1 tab(s) orally once a day  · 	atorvastatin 10 mg oral tablet: Last Dose Taken:  , 1 tab(s) orally once a day  · 	lisinopril 5 mg oral tablet: Last Dose Taken:  , 1 tab(s) orally once a day  · 	Eliquis 5 mg oral tablet: Last Dose Taken:  , 1 tab(s) orally 2 times a day    Vital Signs Last 24 Hrs  T(C): 37 (25 Jul 2021 10:04), Max: 37 (25 Jul 2021 10:04)  T(F): 98.6 (25 Jul 2021 10:04), Max: 98.6 (25 Jul 2021 10:04)  HR: 77 (25 Jul 2021 10:04) (65 - 100)  BP: 154/87 (25 Jul 2021 10:04) (139/101 - 192/120)  BP(mean): 114 (25 Jul 2021 07:08) (106 - 141)  RR: 14 (25 Jul 2021 10:04) (14 - 16)  SpO2: 98% (25 Jul 2021 10:04) (95% - 98%)    NAD, A+Ox3  RRR  no increased WOB  ABD S NT ND  Crockett draining clear yellow urine with CBI clamped                          13.2   4.93  )-----------( 165      ( 25 Jul 2021 06:50 )             41.9     07-25    143  |  113<H>  |  13  ----------------------------<  94  4.4   |  28  |  0.88    Ca    8.4<L>      25 Jul 2021 06:50    TPro  6.0  /  Alb  2.9<L>  /  TBili  0.9  /  DBili  x   /  AST  8<L>  /  ALT  15  /  AlkPhos  79  07-25

## 2021-07-25 NOTE — ED ADULT NURSE REASSESSMENT NOTE - NS ED NURSE REASSESS COMMENT FT1
pt CBI was draining well, blood tinged and few small clots.  around 0500 pt complaining of pressure and increased pain/full feeling.  irrigated Goode and removed a few big clots, pt stated immediate relief and CBI started to drain again this time clearer.  will ctm.

## 2021-07-25 NOTE — DISCHARGE NOTE PROVIDER - HOSPITAL COURSE
patient presented with hematuria likely due to crockett trauma. CBI started. seen by urology. bleeding has stopped and urine clearing. evaluated by urology and CBI stopped. d/w Dr. Leal and ok to restart Eliquis and to discharge patient home. pt agreeable w/ plan. pt seen and examined. he denies any complaints and feels better. will need to f/u w/ urology as OP in 1 week for possible cystoscopy. instructed pt to see urologist or return to ER if having gross hematuria again. offered pt to stay overnight to monitor while restarting eliquis but he wants to go home.    T(C): 37 (07-25-21 @ 10:04), Max: 37 (07-25-21 @ 10:04)  HR: 77 (07-25-21 @ 10:04) (65 - 100)  BP: 154/87 (07-25-21 @ 10:04) (139/101 - 192/120)  RR: 14 (07-25-21 @ 10:04) (14 - 16)  SpO2: 98% (07-25-21 @ 10:04) (95% - 98%)    Gen - Pleasant, cooperative, in no acute distress  HEENT- PERRL, moist mucus membranes, OP clear  CV - IRIR, No m/r/g, +pulses, no edema  Lungs - Good effort, Clear to auscultation bilaterally  Abdomen - Soft, nontender, nondistended, +BS, No rebound/rigidity/guarding  Ext - No cyanosis/clubbing.   Skin - No rashes, No jaundice  Psych- Alert & oriented x 3  Neuro- fluent speech, no facial droop, EOMI. moves all ext    Dispo: discharge to home in stable condition    Final diagnosis, treatment plan, and follow-up recommendations were discussed and explained to the patient. The patient was given an opportunity to ask questions concerning the diagnosis and treatment plan. The patient acknowledged understanding of the diagnosis, treatment, and follow-up recommendations. The patient was advised to seek urgent care upon discharge if worsening symptoms develop prior to scheduled follow-up. Time spent on discharge included time with the patient, and also coordinating discharge care as outlined below.    Total time spent: 50 min

## 2021-07-25 NOTE — DISCHARGE NOTE NURSING/CASE MANAGEMENT/SOCIAL WORK - NSDCVIVACCINE_GEN_ALL_CORE_FT
Tdap; 12-Jan-2021 16:41; Viviana Alvarez (NINI); [employer]; [id number] (Exp. Date: 31-Jul-2022); IntraMuscular; Deltoid Left.; 0.5 milliLiter(s); VIS (VIS Published: 09-May-2013, VIS Presented: 12-Jan-2021);

## 2021-07-25 NOTE — DISCHARGE NOTE PROVIDER - CARE PROVIDER_API CALL
Marcus Godfrey)  Urology  [address], [address] [place] [address]  Phone: [phone]  Fax: [phone]  Follow Up Time: 1 week    Valentin Taylor)  Internal Medicine  [address], [address]  [place] [address]  Phone: [phone]  Fax: [phone]  Follow Up Time: 1 week

## 2021-07-25 NOTE — DISCHARGE NOTE NURSING/CASE MANAGEMENT/SOCIAL WORK - PATIENT PORTAL LINK FT
You can access the FollowMyHealth Patient Portal offered by [facility] by registering at the following website: [email or web address]. By joining [facility]’s FollowMyHealth portal, you will also be able to view your health information using other applications (apps) compatible with our system.

## 2021-07-25 NOTE — H&P ADULT - ASSESSMENT
79 y/o M w PMH of HTN, dyslipidemia, OA, BPH, A-fib (on eliquis), p/w crockett not draining properly    *Hematuria  -Eliquis held temporarily  - consult  -Trend H/H     *B/L LE edema  -Avoid IVF  -No respiratory distress  -F/u outpatient if stable     *H/o HTN / dyslipidemia / OA / BPH / A-fib  -C/w home meds (except as noted above) and f/u outpatient for further management if conditions remain stable during hospitalization     *Medication reconciliation  -Patient doesn't recall all of his meds. Reconciled based on DrFirst and previous discharge reconciliation from 4/2021, will need to confirm in AM    *DVT ppx  -SCDs    81 y/o M w PMH of HTN, dyslipidemia, OA, BPH, A-fib (on eliquis), p/w crockett not draining properly    *Hematuria  -As per ED physician, she discussed case with Dr. Leal who recommended a three way catheter   -Eliquis held temporarily  - consult  -Trend H/H     *B/L LE edema  -Avoid IVF  -No respiratory distress  -F/u outpatient if stable     *H/o HTN / dyslipidemia / OA / BPH / A-fib  -C/w home meds (except as noted above) and f/u outpatient for further management if conditions remain stable during hospitalization     *Medication reconciliation  -Patient doesn't recall all of his meds. Reconciled based on Salvatore and previous discharge reconciliation from 4/2021, will need to confirm in AM    *DVT ppx  -SCDs

## 2021-07-25 NOTE — DISCHARGE NOTE PROVIDER - NSDCCPCAREPLAN_GEN_ALL_CORE_FT
PRINCIPAL DISCHARGE DIAGNOSIS  Diagnosis: Hematuria, gross  Assessment and Plan of Treatment: -may be related to trauma of rcockett insertion and being on blood thinner.  -you had continuous bladder irrigation  -you were seen by urology and can restart taking Eliquis.  -follow up with Dr. Godfrey your urologist in 1 week. you may need a cystoscopy as outpatient.  -if you have recurrence of bleeding or crockett stops draining then return to ER   -if you have worsening shortness of breath or chest pain/pressure or feel that you are going to faint, then call 911 or come to the ER immediately.

## 2021-07-25 NOTE — DISCHARGE NOTE PROVIDER - NSDCFUSCHEDAPPT_GEN_ALL_CORE_FT
KRAIG SCANLON ; 07/29/2021 ; [facility] [address]  KRAIG SCANLON ; 07/29/2021 ; [facility] [address]

## 2021-07-25 NOTE — H&P ADULT - HISTORY OF PRESENT ILLNESS
79 y/o M w PMH of HTN, dyslipidemia, OA, BPH, A-fib (on eliquis), p/w crockett not draining properly. Patient had crockett placed on 7/8/21 for urinary retention. Patient states that on Friday he noticed some hematuria and the crockett wasn't draining properly on and off. Yesterday the crockett stopped draining again and he had abdominal discomfort with urinary retention. States that after crockett was replaced in ED, he started having hematuria again. Patient denies abdominal discomfort at this time. Denies nausea, vomiting, fever, chills, cough, runny nose, sore throat, CP, SOB      PSH: Denies     Social Hx: Former smoker, etoh - 1 drink per day, drugs - denies     Family Hx: Denies

## 2021-07-25 NOTE — CONSULT NOTE ADULT - ASSESSMENT
79 yo M with chronic indwelling crockett for BPH, admitted with hematuria for CBI. Clear this am, disconnected. OK to DC from  standpoint.

## 2021-07-25 NOTE — DISCHARGE NOTE PROVIDER - NSDCMRMEDTOKEN_GEN_ALL_CORE_FT
atorvastatin 10 mg oral tablet: 1 tab(s) orally once a day  Eliquis 5 mg oral tablet: 1 tab(s) orally 2 times a day  Flomax 0.4 mg oral capsule: 1 cap(s) orally once a day   lisinopril 5 mg oral tablet: 1 tab(s) orally once a day  metoprolol succinate 100 mg oral tablet, extended release: 1 tab(s) orally once a day  Protonix 40 mg oral delayed release tablet: 1 tab(s) orally once a day

## 2021-07-25 NOTE — DISCHARGE NOTE PROVIDER - CARE PROVIDERS DIRECT ADDRESSES
,[email or web address]_Unknown Statement Selected

## 2021-07-25 NOTE — ED ADULT NURSE REASSESSMENT NOTE - NS ED NURSE REASSESS COMMENT FT1
received report from rn griselda, pt resting comfortably, vitals stable, awaiting bed on unit, CBI draining light yellow, new bag put up, pt aware of plan of care

## 2021-07-25 NOTE — DISCHARGE NOTE PROVIDER - PROVIDER TOKENS
PROVIDER:[TOKEN:[[id number]],FOLLOWUP:[1 week]],PROVIDER:[TOKEN:[[id number]],FOLLOWUP:[1 week]]

## 2021-07-29 ENCOUNTER — APPOINTMENT (OUTPATIENT)
Dept: UROLOGY | Facility: CLINIC | Age: 81
End: 2021-07-29
Payer: MEDICARE

## 2021-07-29 PROCEDURE — A4218: CPT | Mod: NC

## 2021-07-29 PROCEDURE — 51700 IRRIGATION OF BLADDER: CPT

## 2021-07-29 NOTE — CHART NOTE - NSCHARTNOTEFT_GEN_A_CORE
Spoke with Ramona - daughter about UCx (enterococcus) results. sent 10d augmentin to pharmacy. to f/u Dr. alfaro pcp

## 2021-08-05 ENCOUNTER — APPOINTMENT (OUTPATIENT)
Dept: UROLOGY | Facility: CLINIC | Age: 81
End: 2021-08-05
Payer: MEDICARE

## 2021-08-05 PROCEDURE — 51702 INSERT TEMP BLADDER CATH: CPT

## 2021-08-06 DIAGNOSIS — E78.5 HYPERLIPIDEMIA, UNSPECIFIED: ICD-10-CM

## 2021-08-06 DIAGNOSIS — R31.0 GROSS HEMATURIA: ICD-10-CM

## 2021-08-06 DIAGNOSIS — H54.8 LEGAL BLINDNESS, AS DEFINED IN USA: ICD-10-CM

## 2021-08-06 DIAGNOSIS — B95.2 ENTEROCOCCUS AS THE CAUSE OF DISEASES CLASSIFIED ELSEWHERE: ICD-10-CM
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
## 2021-08-06 DIAGNOSIS — I49.1 ATRIAL PREMATURE DEPOLARIZATION: ICD-10-CM

## 2021-08-06 DIAGNOSIS — T83.011A BREAKDOWN (MECHANICAL) OF INDWELLING URETHRAL CATHETER, INITIAL ENCOUNTER: ICD-10-CM

## 2021-08-06 DIAGNOSIS — N40.0 BENIGN PROSTATIC HYPERPLASIA WITHOUT LOWER URINARY TRACT SYMPTOMS: ICD-10-CM

## 2021-08-06 DIAGNOSIS — I10 ESSENTIAL (PRIMARY) HYPERTENSION: ICD-10-CM

## 2021-08-06 DIAGNOSIS — M19.91 PRIMARY OSTEOARTHRITIS, UNSPECIFIED SITE: ICD-10-CM

## 2021-08-26 ENCOUNTER — APPOINTMENT (OUTPATIENT)
Dept: UROLOGY | Facility: CLINIC | Age: 81
End: 2021-08-26

## 2021-09-02 ENCOUNTER — APPOINTMENT (OUTPATIENT)
Dept: UROLOGY | Facility: CLINIC | Age: 81
End: 2021-09-02
Payer: MEDICARE

## 2021-09-02 PROCEDURE — 51702 INSERT TEMP BLADDER CATH: CPT

## 2021-09-28 ENCOUNTER — APPOINTMENT (OUTPATIENT)
Dept: UROLOGY | Facility: CLINIC | Age: 81
End: 2021-09-28
Payer: MEDICARE

## 2021-09-28 ENCOUNTER — APPOINTMENT (OUTPATIENT)
Dept: UROLOGY | Facility: CLINIC | Age: 81
End: 2021-09-28

## 2021-09-28 VITALS
TEMPERATURE: 97.2 F | SYSTOLIC BLOOD PRESSURE: 143 MMHG | HEART RATE: 91 BPM | OXYGEN SATURATION: 99 % | DIASTOLIC BLOOD PRESSURE: 88 MMHG

## 2021-09-28 PROCEDURE — 51702 INSERT TEMP BLADDER CATH: CPT

## 2021-09-30 ENCOUNTER — APPOINTMENT (OUTPATIENT)
Dept: UROLOGY | Facility: CLINIC | Age: 81
End: 2021-09-30
Payer: MEDICARE

## 2021-09-30 PROCEDURE — A4218: CPT | Mod: NC

## 2021-09-30 PROCEDURE — 51700 IRRIGATION OF BLADDER: CPT

## 2021-10-12 ENCOUNTER — APPOINTMENT (OUTPATIENT)
Dept: UROLOGY | Facility: CLINIC | Age: 81
End: 2021-10-12
Payer: MEDICARE

## 2021-10-12 DIAGNOSIS — N39.0 URINARY TRACT INFECTION, SITE NOT SPECIFIED: ICD-10-CM

## 2021-10-12 DIAGNOSIS — A49.9 URINARY TRACT INFECTION, SITE NOT SPECIFIED: ICD-10-CM

## 2021-10-12 PROCEDURE — 51702 INSERT TEMP BLADDER CATH: CPT

## 2021-10-20 ENCOUNTER — APPOINTMENT (OUTPATIENT)
Dept: UROLOGY | Facility: CLINIC | Age: 81
End: 2021-10-20
Payer: MEDICARE

## 2021-10-20 PROCEDURE — 51702 INSERT TEMP BLADDER CATH: CPT

## 2021-10-20 RX ORDER — OXYBUTYNIN CHLORIDE 10 MG/1
10 TABLET, EXTENDED RELEASE ORAL
Qty: 30 | Refills: 3 | Status: DISCONTINUED | COMMUNITY
Start: 2021-10-20 | End: 2021-10-20

## 2021-10-21 ENCOUNTER — APPOINTMENT (OUTPATIENT)
Dept: UROLOGY | Facility: CLINIC | Age: 81
End: 2021-10-21
Payer: MEDICARE

## 2021-10-21 PROCEDURE — 51700 IRRIGATION OF BLADDER: CPT

## 2021-10-21 PROCEDURE — A4218: CPT | Mod: NC

## 2021-10-21 NOTE — PHYSICAL EXAM

## 2021-10-21 NOTE — HISTORY OF PRESENT ILLNESS
[FreeTextEntry1] : Patient presented with complaint of catheter leakage, strong urgency. \par Catheter was irrigated with normal saline, easy instillation and aspiration.

## 2021-10-21 NOTE — ASSESSMENT
[FreeTextEntry1] : 81 yo M with urinary retention, chronic crockett. Crockett irrigated well. Suspect bladder spasms. Recommended he try oxybutynin.

## 2021-10-26 ENCOUNTER — APPOINTMENT (OUTPATIENT)
Dept: UROLOGY | Facility: CLINIC | Age: 81
End: 2021-10-26

## 2021-10-26 ENCOUNTER — EMERGENCY (EMERGENCY)
Facility: HOSPITAL | Age: 81
LOS: 0 days | Discharge: HOME | End: 2021-10-26
Attending: EMERGENCY MEDICINE
Payer: MEDICARE

## 2021-10-26 VITALS — HEIGHT: 72 IN | WEIGHT: 235.01 LBS

## 2021-10-26 VITALS — HEART RATE: 110 BPM | OXYGEN SATURATION: 100 % | TEMPERATURE: 98 F | RESPIRATION RATE: 18 BRPM

## 2021-10-26 DIAGNOSIS — I48.91 UNSPECIFIED ATRIAL FIBRILLATION: ICD-10-CM
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
## 2021-10-26 DIAGNOSIS — I10 ESSENTIAL (PRIMARY) HYPERTENSION: ICD-10-CM

## 2021-10-26 DIAGNOSIS — H54.8 LEGAL BLINDNESS, AS DEFINED IN USA: ICD-10-CM

## 2021-10-26 DIAGNOSIS — Z98.890 OTHER SPECIFIED POSTPROCEDURAL STATES: Chronic | ICD-10-CM

## 2021-10-26 DIAGNOSIS — E78.00 PURE HYPERCHOLESTEROLEMIA, UNSPECIFIED: ICD-10-CM
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
## 2021-10-26 DIAGNOSIS — R33.9 RETENTION OF URINE, UNSPECIFIED: ICD-10-CM

## 2021-10-26 DIAGNOSIS — Z79.01 LONG TERM (CURRENT) USE OF ANTICOAGULANTS: ICD-10-CM

## 2021-10-26 DIAGNOSIS — N40.0 BENIGN PROSTATIC HYPERPLASIA WITHOUT LOWER URINARY TRACT SYMPTOMS: ICD-10-CM

## 2021-10-26 DIAGNOSIS — E66.9 OBESITY, UNSPECIFIED: ICD-10-CM

## 2021-10-26 DIAGNOSIS — E78.5 HYPERLIPIDEMIA, UNSPECIFIED: ICD-10-CM

## 2021-10-26 DIAGNOSIS — Z98.41 CATARACT EXTRACTION STATUS, RIGHT EYE: ICD-10-CM

## 2021-10-26 DIAGNOSIS — T83.098A OTHER MECHANICAL COMPLICATION OF OTHER URINARY CATHETER, INITIAL ENCOUNTER: ICD-10-CM

## 2021-10-26 DIAGNOSIS — M19.90 UNSPECIFIED OSTEOARTHRITIS, UNSPECIFIED SITE: ICD-10-CM

## 2021-10-26 DIAGNOSIS — H91.90 UNSPECIFIED HEARING LOSS, UNSPECIFIED EAR: ICD-10-CM

## 2021-10-26 PROCEDURE — 99282 EMERGENCY DEPT VISIT SF MDM: CPT

## 2021-10-26 PROCEDURE — 99283 EMERGENCY DEPT VISIT LOW MDM: CPT

## 2021-10-26 NOTE — ED PROVIDER NOTE - PATIENT PORTAL LINK FT
You can access the FollowMyHealth Patient Portal offered by [facility] by registering at the following website: [email or web address]. By joining [facility]’s FollowMyHealth portal, you will also be able to view your health information using other applications (apps) compatible with our system.

## 2021-10-26 NOTE — ED ADULT NURSE NOTE - CHIEF COMPLAINT QUOTE
chronic crockett catheter x 3 months; last changed 1 week ago. reports no drainage from crockett catheter, last working normally around 6:00PM yesterday. reports no drainage in bag, woke up urine dripping around catheter and increasing suprapubic pain/pressure.   urologist: dr. trevizo.

## 2021-10-26 NOTE — ED PROVIDER NOTE - PROGRESS NOTE DETAILS
POCUS u/s showing no urine retention in bladder crockett in correct place leg bag replaced likely clot that resolved its drianing fine now pt advised f/u with dr trevizo in office this week. return precautiosn given to pt he agrees to plan of care

## 2021-10-26 NOTE — ED PROVIDER NOTE - OBJECTIVE STATEMENT
81 y/o M w PMH of HTN, dyslipidemia, OA, BPH, A-fib (on eliquis) presents because he thingks chronic crockett bag was not draining urine at my evaluation pt in nad comfortable and states to me " maybe it started draining just now" he has no fevers no abd pain no vomiting. no other acute issues symptoms or concerns.

## 2021-10-26 NOTE — ED ADULT TRIAGE NOTE - CHIEF COMPLAINT QUOTE
chronic crokcett catheter x 3 months; last changed 1 week ago. reports no drainage from crockett catheter, last working normally around 6:00PM yesterday. reports no drainage in bag, woke up urine dripping around catheter and increasing suprapubic pain/pressure.   urologist: dr. trevizo.

## 2021-10-26 NOTE — ED ADULT NURSE NOTE - CAS DISCH BELONGINGS RETURNED
Detail Level: Detailed
Initiate Treatment: Tacrolimus ointment to apply to breakouts daily  PRN, and Triamcinolone ointment to apply bid x 2 weeks as needed for flares; discussed TIW and alternating with Tacrolimus
Plan: Avoid hot showers and fragrances and moisturize twice daily especially after showers
Not applicable

## 2021-10-26 NOTE — ED ADULT NURSE NOTE - OBJECTIVE STATEMENT
79 y/o a&ox4 male comes into ED for "urinary retention. pt has a chronic crockett catheter x 3 months; last changed 1 week ago. reports no drainage from crockett catheter, last working normally around 6:00PM yesterday. reports no drainage in bag, woke up urine dripping around catheter and increasing suprapubic pain/pressure. Authored RN observed and emptied out 400ml of urine from pt chronic f/c, New leg bag placed and changed out, pt is producing urine output, MD Conteh proformed ultrasound of bladder, pt did no show signs of any urinary retention.   urologist: dr. trevizo.

## 2021-10-28 ENCOUNTER — RX RENEWAL (OUTPATIENT)
Age: 81
End: 2021-10-28

## 2021-10-28 ENCOUNTER — APPOINTMENT (OUTPATIENT)
Dept: UROLOGY | Facility: CLINIC | Age: 81
End: 2021-10-28
Payer: MEDICARE

## 2021-10-28 ENCOUNTER — APPOINTMENT (OUTPATIENT)
Dept: UROLOGY | Facility: CLINIC | Age: 81
End: 2021-10-28

## 2021-10-28 PROCEDURE — 51702 INSERT TEMP BLADDER CATH: CPT

## 2021-11-05 NOTE — PATIENT PROFILE ADULT - FUNCTIONAL SCREEN CURRENT LEVEL: EATING, MLM
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

## 2021-11-11 ENCOUNTER — APPOINTMENT (OUTPATIENT)
Dept: UROLOGY | Facility: CLINIC | Age: 81
End: 2021-11-11

## 2021-11-11 ENCOUNTER — APPOINTMENT (OUTPATIENT)
Dept: UROLOGY | Facility: CLINIC | Age: 81
End: 2021-11-11
Payer: MEDICARE

## 2021-11-11 VITALS
TEMPERATURE: 97.1 F | HEART RATE: 70 BPM | DIASTOLIC BLOOD PRESSURE: 80 MMHG | SYSTOLIC BLOOD PRESSURE: 134 MMHG | OXYGEN SATURATION: 97 %

## 2021-11-11 PROCEDURE — 51702 INSERT TEMP BLADDER CATH: CPT

## 2021-11-17 ENCOUNTER — APPOINTMENT (OUTPATIENT)
Dept: UROLOGY | Facility: CLINIC | Age: 81
End: 2021-11-17
Payer: MEDICARE

## 2021-11-17 ENCOUNTER — NON-APPOINTMENT (OUTPATIENT)
Age: 81
End: 2021-11-17

## 2021-11-17 PROCEDURE — 51702 INSERT TEMP BLADDER CATH: CPT

## 2021-11-17 RX ORDER — CIPROFLOXACIN HYDROCHLORIDE 500 MG/1
500 TABLET, FILM COATED ORAL
Qty: 14 | Refills: 0 | Status: COMPLETED | COMMUNITY
Start: 2021-05-17 | End: 2021-11-17

## 2021-11-18 ENCOUNTER — APPOINTMENT (OUTPATIENT)
Dept: UROLOGY | Facility: CLINIC | Age: 81
End: 2021-11-18

## 2021-11-23 ENCOUNTER — NON-APPOINTMENT (OUTPATIENT)
Age: 81
End: 2021-11-23

## 2021-11-23 ENCOUNTER — EMERGENCY (EMERGENCY)
Facility: HOSPITAL | Age: 81
LOS: 0 days | Discharge: HOME | End: 2021-11-23
Attending: EMERGENCY MEDICINE
Payer: MEDICARE

## 2021-11-23 VITALS
OXYGEN SATURATION: 97 % | DIASTOLIC BLOOD PRESSURE: 93 MMHG | HEART RATE: 80 BPM | TEMPERATURE: 98 F | RESPIRATION RATE: 16 BRPM | SYSTOLIC BLOOD PRESSURE: 142 MMHG

## 2021-11-23 VITALS — HEIGHT: 72 IN | WEIGHT: 235.01 LBS

## 2021-11-23 DIAGNOSIS — Z98.890 OTHER SPECIFIED POSTPROCEDURAL STATES: Chronic | ICD-10-CM

## 2021-11-23 DIAGNOSIS — Z79.01 LONG TERM (CURRENT) USE OF ANTICOAGULANTS: ICD-10-CM

## 2021-11-23 DIAGNOSIS — R33.9 RETENTION OF URINE, UNSPECIFIED: ICD-10-CM

## 2021-11-23 DIAGNOSIS — T83.091A OTHER MECHANICAL COMPLICATION OF INDWELLING URETHRAL CATHETER, INITIAL ENCOUNTER: ICD-10-CM

## 2021-11-23 DIAGNOSIS — E78.00 PURE HYPERCHOLESTEROLEMIA, UNSPECIFIED: ICD-10-CM

## 2021-11-23 DIAGNOSIS — Z87.438 PERSONAL HISTORY OF OTHER DISEASES OF MALE GENITAL ORGANS: ICD-10-CM
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
## 2021-11-23 DIAGNOSIS — I10 ESSENTIAL (PRIMARY) HYPERTENSION: ICD-10-CM

## 2021-11-23 PROCEDURE — 99282 EMERGENCY DEPT VISIT SF MDM: CPT

## 2021-11-23 PROCEDURE — 99283 EMERGENCY DEPT VISIT LOW MDM: CPT

## 2021-11-23 NOTE — ED ADULT NURSE NOTE - OBJECTIVE STATEMENT
Pt is an 81yr old male, A&OX4 and ambulatory, presenting to the ED for decreased urine output in his Goode since 2AM. Pt has Goode for "prostate blockage", supposed to be getting procedure done this week. Endorses suprapubic pressure. Goode catheter flushed with ease, no noted resistance. Goode catheter now draining clear yellow urine. Pt states relief. Resp. even and unlabored. Denies any other complaints. VS as noted. In NAD.

## 2021-11-23 NOTE — ED PROVIDER NOTE - RESPIRATORY, MLM
After Visit Summary   7/20/2018    Shanta Edmondson    MRN: [id number]           Patient Information     Date Of Birth          1993        Visit Information        Provider Department      7/20/2018 9:30 AM Radha Corona PA-C [facility]        Today's Diagnoses     Acne vulgaris    -  1    Dermatitis, seborrheic           Follow-ups after your visit        Who to contact     If you have questions or need follow up information about today's clinic visit or your schedule please contact [facility] directly at [phone].  Normal or non-critical lab and imaging results will be communicated to you by MyChart, letter or phone within 4 business days after the clinic has received the results. If you do not hear from us within 7 days, please contact the clinic through [email or web address] or phone. If you have a critical or abnormal lab result, we will notify you by phone as soon as possible.  Submit refill requests through Onc[employer]reon or call your pharmacy and they will forward the refill request to us. Please allow 3 business days for your refill to be completed.          Additional Information About Your Visit        MyChart Information     [employer] gives you secure access to your electronic health record. If you see a primary care provider, you can also send messages to your care team and make appointments. If you have questions, please call your primary care clinic.  If you do not have a primary care provider, please call [phone] and they will assist you.        Care EveryWhere ID     This is your Care EveryWhere ID. This could be used by other organizations to access your [place] medical records  [id number]        Your Vitals Were     Pulse Last Period Pulse Oximetry Breastfeeding?          93 07/14/2018 97% No         Blood Pressure from Last 3 Encounters:   07/20/18 103/75   04/24/18 109/76   09/22/17 101/62    Weight from Last 3 Encounters:    09/22/17 53.1 kg (117 lb)   11/04/16 52.4 kg (115 lb 8 oz)   09/29/15 50.8 kg (112 lb)              Today, you had the following     No orders found for display       Primary Care Provider Office Phone # Fax #    Laney Todd PA-C [phone]       [address]  [facility] [address]        Equal Access to Services     JENNIFER FORD : Hadii aad ku hadasho Soomaali, waaxda luqadaha, qaybta kaalmada adeegyada, waxay idiin hayaan adeeg kharash la'chiqui . So [facility] [phone].    ATENCIÓN: Si habla español, tiene a vera disposición servicios gratuitos de asistencia lingüística. Llame al [phone].    We comply with applicable federal civil rights laws and [place] laws. We do not discriminate on the basis of race, color, national origin, age, disability, sex, sexual orientation, or gender identity.            Thank you!     Thank you for choosing [facility]  for your care. Our goal is always to provide you with excellent care. Hearing back from our patients is one way we can continue to improve our services. Please take a few minutes to complete the written survey that you may receive in the mail after your visit with us. Thank you!             Your Updated Medication List - Protect others around you: Learn how to safely use, store and throw away your medicines at www.disposemymeds.org.          This list is accurate as of 7/20/18  9:51 AM.  Always use your most recent med list.                   Brand Name Dispense Instructions for use Diagnosis    * clobetasol propionate 0.05 % Sham     1 Bottle    Apply topically daily as needed Apply sparingly to dry scalp once daily for 2 weeks, leave in place for 15 min, then add water, lather, rinse thoroughly.    Dermatitis       * clobetasol propionate 0.05 % Foam     100 g    Apply sparingly to scalp once daily for two weeks. Leave overnight and shower normally in the morning.  Do not apply to face.    Dermatitis        doxycycline monohydrate 100 MG capsule     60 capsule    1 tab BID with food and water x 3 months    Acne vulgaris       ketoconazole 2 % shampoo    NIZORAL    120 mL    Apply topically daily as needed for itching or irritation    Dermatitis, seborrheic       magnesium 250 MG tablet     100 tablet    Take 1 tablet by mouth daily    Restless leg syndrome       MULTIVITAMIN ADULT PO           omeprazole 20 MG tablet     60 tablet    Take 1 tablet (20 mg) by mouth daily Take 30-60 minutes before a meal and wait 30 minutes before eating after taking the medication.    Gastroesophageal reflux disease without esophagitis       tretinoin 0.05 % cream    RETIN-A    45 g    Spread a pea size amount into affected area topically at bedtime.  Use sunscreen SPF>20.    Acne vulgaris       vitamin B complex with vitamin C Tabs tablet      Take 1 tablet by mouth daily        * Notice:  This list has 2 medication(s) that are the same as other medications prescribed for you. Read the directions carefully, and ask your doctor or other care provider to review them with you.       Breath sounds clear and equal bilaterally.

## 2021-11-23 NOTE — ED PROVIDER NOTE - OBJECTIVE STATEMENT
82 y/o male in ED c/o crockett clogged tonight.   pt states this crockett placed 1 wk ago by Dr Leal.   pt states tonight around 2AM noticed no drainage into bag.   states now with abd fullness.   pt denies any fever, HA, cp, sob, n/v/d.

## 2021-11-23 NOTE — ED ADULT TRIAGE NOTE - CHIEF COMPLAINT QUOTE
pt. presents to ED c/o Goode catheter complications since 2am. pt. states catheter is not draining properly. c/o lower abdomen discomfort and distention. no other complains in triage.

## 2021-11-24 ENCOUNTER — APPOINTMENT (OUTPATIENT)
Dept: UROLOGY | Facility: HOSPITAL | Age: 81
End: 2021-11-24

## 2021-11-26 ENCOUNTER — EMERGENCY (EMERGENCY)
Facility: HOSPITAL | Age: 81
LOS: 0 days | Discharge: HOME | End: 2021-11-26
Attending: STUDENT IN AN ORGANIZED HEALTH CARE EDUCATION/TRAINING PROGRAM
Payer: MEDICARE

## 2021-11-26 VITALS
SYSTOLIC BLOOD PRESSURE: 138 MMHG | OXYGEN SATURATION: 95 % | HEART RATE: 86 BPM | DIASTOLIC BLOOD PRESSURE: 89 MMHG | RESPIRATION RATE: 18 BRPM | TEMPERATURE: 98 F

## 2021-11-26 VITALS — WEIGHT: 235.01 LBS | HEIGHT: 72 IN

## 2021-11-26 DIAGNOSIS — I10 ESSENTIAL (PRIMARY) HYPERTENSION: ICD-10-CM

## 2021-11-26 DIAGNOSIS — Z98.890 OTHER SPECIFIED POSTPROCEDURAL STATES: Chronic | ICD-10-CM

## 2021-11-26 DIAGNOSIS — Z79.01 LONG TERM (CURRENT) USE OF ANTICOAGULANTS: ICD-10-CM

## 2021-11-26 DIAGNOSIS — T83.091A OTHER MECHANICAL COMPLICATION OF INDWELLING URETHRAL CATHETER, INITIAL ENCOUNTER: ICD-10-CM
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
## 2021-11-26 DIAGNOSIS — E78.00 PURE HYPERCHOLESTEROLEMIA, UNSPECIFIED: ICD-10-CM

## 2021-11-26 DIAGNOSIS — Z87.438 PERSONAL HISTORY OF OTHER DISEASES OF MALE GENITAL ORGANS: ICD-10-CM
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
## 2021-11-26 LAB
APPEARANCE UR: ABNORMAL
BACTERIA # UR AUTO: ABNORMAL
BILIRUB UR-MCNC: NEGATIVE — SIGNIFICANT CHANGE UP
COLOR SPEC: YELLOW — SIGNIFICANT CHANGE UP
DIFF PNL FLD: ABNORMAL
GLUCOSE UR QL: NEGATIVE MG/DL — SIGNIFICANT CHANGE UP
KETONES UR-MCNC: NEGATIVE — SIGNIFICANT CHANGE UP
LEUKOCYTE ESTERASE UR-ACNC: ABNORMAL
NITRITE UR-MCNC: NEGATIVE — SIGNIFICANT CHANGE UP
PH UR: 8 — SIGNIFICANT CHANGE UP (ref 5–8)
PROT UR-MCNC: 100 MG/DL
RBC CASTS # UR COMP ASSIST: ABNORMAL /HPF (ref 0–4)
SP GR SPEC: 1.01 — SIGNIFICANT CHANGE UP (ref 1.01–1.02)
TRI-PHOS CRY UR QL COMP ASSIST: ABNORMAL
UROBILINOGEN FLD QL: NEGATIVE MG/DL — SIGNIFICANT CHANGE UP
WBC UR QL: ABNORMAL

## 2021-11-26 PROCEDURE — 81001 URINALYSIS AUTO W/SCOPE: CPT

## 2021-11-26 PROCEDURE — 51702 INSERT TEMP BLADDER CATH: CPT

## 2021-11-26 PROCEDURE — 99283 EMERGENCY DEPT VISIT LOW MDM: CPT

## 2021-11-26 PROCEDURE — 99283 EMERGENCY DEPT VISIT LOW MDM: CPT | Mod: 25

## 2021-11-26 PROCEDURE — 87086 URINE CULTURE/COLONY COUNT: CPT

## 2021-11-26 RX ORDER — CEFPODOXIME PROXETIL 100 MG
1 TABLET ORAL
Qty: 20 | Refills: 0
Start: 2021-11-26 | End: 2021-12-05

## 2021-11-26 RX ORDER — CEFPODOXIME PROXETIL 100 MG
100 TABLET ORAL ONCE
Refills: 0 | Status: DISCONTINUED | OUTPATIENT
Start: 2021-11-26 | End: 2021-11-26

## 2021-11-26 RX ORDER — CEFPODOXIME PROXETIL 100 MG
200 TABLET ORAL ONCE
Refills: 0 | Status: COMPLETED | OUTPATIENT
Start: 2021-11-26 | End: 2021-11-26

## 2021-11-26 RX ADMIN — Medication 200 MILLIGRAM(S): at 07:45

## 2021-11-26 NOTE — ED PROVIDER NOTE - OBJECTIVE STATEMENT
Keyon MCGILL: 81M hx of afib BPH s/p chronic crockett presents with a cc of crockett problem reports crockett was clogged this morning a/w decreased outpt and purulent like drainage, no other complaints. No back pain fever abdominal pain or other urinary sx, limited historian was started on tamsulosin and  Bethanechol Chloride  earlier this week by his urologist on chart review No other complaints. LE swelling is at baseline. Denies n/v/f/c/cp/sob. Denies headache, syncope, lightheadedness, dizziness. Denies chest palpitations, abdominal pain. Denies edema. Denies dysuria, hematuria, BRBPR, tarry stools, diarrhea, constipation.

## 2021-11-26 NOTE — ED PROVIDER NOTE - PHYSICAL EXAMINATION
Keyon MCGILL:  VITALS: Initial triage and subsequent vitals have been reviewed by me.  GEN APPEARANCE: WDWN, alert and cooperative, non-toxic appearing and in NAD  HEAD: Atraumatic, normocephalic   EYES: PERRLa, EOMI, vision grossly intact.   EARS: Gross hearing intact.   NOSE: No nasal discharge, no external evidence of epistaxis.   NECK: Supple  CV: RRR, S1S2, no c/r/m/g. No cyanosis or pallor. Extremities warm, well perfused. Cap refill <2 seconds. No bruits.   LUNGS: CTAB. No wheezing. No rales. No rhonchi. No diminished breath sounds.   ABDOMEN: Soft, NTND. No guarding or rebound. No masses.   MSK/EXT: Spine appears normal, no spine point tenderness. No CVA ttp. Normal muscular development. Pelvis stable. No obvious joint or bony deformity, no peripheral edema.   NEURO: Alert, follows commands. Weight bearing normal. Speech normal. Sensation and motor normal x4 extremities.   SKIN: Normal color for race, warm, dry and intact. No evidence of rash.  PSYCH: Normal mood and affect.   Exam (Male): Un-Circumcised penis, external anatomy appears normal, no e/o priapism, no e/o trama. No testicular swelling, erythema, or tenderness. No urethral discharge or bleeding. Goode in place w cloudy/sediment/purulent discharge  EXAM WITH: RN at bedside

## 2021-11-26 NOTE — ED PROVIDER NOTE - NSFOLLOWUPINSTRUCTIONS_ED_ALL_ED_FT
Thank you for visiting our Emergency Department, it has been a pleasure taking part in your healthcare.    Your discharge diagnosis is: Goode catheter problem  Please take all discharge medications as indicated below:  cefpodoxime 100mg twice a day x10 days  Please follow up with your PMD within x48 hours.  Please follow up with your urologist within x48 hours.  A list of providers for follow up has been given to you.  A copy of resulted labs, imaging, and findings have been provided to you.   You have had a detailed discussion with your provider regarding your diagnosis, care management and discharge planning including, but not limited to: return precautions, follow up visits with existing or new providers, new prescriptions and/or medication changes, wound and/or splint/cast care or other care   aspects specific to your diagnosis and treatment. You have been given the opportunity to have your questions answered. At this time you have been deemed stable and fit for discharge.  Return precautions to the Emergency Department include but are not limited to: unrelenting nausea, vomiting, fever, chills, chest pain, shortness of breath, dizziness, chest or abdominal pain, worsening back pain, syncope, blood in urine or stool, headache that doesn't resolve, numbness or tingling, loss of sensation, loss of motor function, or any other concerning symptoms.

## 2021-11-26 NOTE — ED ADULT NURSE NOTE - OBJECTIVE STATEMENT
82 y/o male comes into ED for c/o of "urinary complications. patient reports his urinary catheter is clogged. Urine found in f/c upon examination about 10ml of urine, pt states he has not been drinking fluids because of being afraid of "urinary retention." bladder scanner performed at bedside 4ml of urine in bladder.   patient seen in ED on Tuesday for same issue, catheter was replaced then. 82 y/o male comes into ED for c/o of "urinary complications. patient reports his urinary catheter is clogged. Urine found in f/c upon examination about 10ml of urine, pt states he has not been drinking fluids because of being afraid of "urinary retention." pt abd appears distended, f/c replaced in ED, urine appears very dark and cloudy, with a fishy odor, 600ml put out of f/c, urine collected and sent to lab. patient seen in ED on Tuesday for same issue, catheter was replaced then.

## 2021-11-26 NOTE — ED PROVIDER NOTE - CARE PROVIDER_API CALL
Jerald Leal)  Urology  [address], [address] [place] [address]  Phone: [phone]  Fax: [phone]  Follow Up Time:

## 2021-11-26 NOTE — ED ADULT NURSE NOTE - NS PRO AD NO ADVANCE DIRECTIVE
[facility] (F[facility] Clinic referral)  March 15, 2021  Video Visit    Behavioral Health Clinician Progress Note    Patient Name: Martha Chnu           Service Type:  Individual      Service Location:   Video Visit     Session Start Time: 425pm  Session End Time: 515pm      Session Length: 38 - 52      Attendees: Patient and Facial Pain Clinic Care Team    Visit Activities (Refresh list every visit): NEW and [facility] Cov[employer]    Diagnostic Assessment Date: has seen multiple mental health providers in the past - most recent notes are from [facility] in 2020.  Treatment Plan Review Date: none with me  See Flowsheets for today's PHQ-9 and ROSIO-7 results  Previous PHQ-9: No flowsheet data found.  Previous ROSIO-7: No flowsheet data found.    LISA LEVEL:  No flowsheet data found.    DATA  Extended Session (60+ minutes): No  Interactive Complexity: No  Crisis: No  [facility] Patient: No    Treatment Objective(s) Addressed in This Session:  Target Behavior(s): disease management/lifestyle changes      Adjustment Difficulties: will develop coping/problem-solving skills to facilitate more adaptive adjustment    Current Stressors / Issues:  Telemedicine Visit: The patient's condition can be safely assessed and treated via synchronous audio and visual telemedicine encounter.      Reason for Telemedicine Visit: Covid-19 pandemic    Originating Site (Patient Location): Patient's home    Distant Site (Provider Location): Provider Remote Setting    Consent:  The patient/guardian has verbally consented to: the potential risks and benefits of telemedicine (video visit) versus in person care; bill my insurance or make self-payment for services provided; and responsibility for payment of non-covered services.     Mode of Communication:  Video Conference via Zoom     As the provider I attest to compliance with applicable laws and regulations related to telemedicine.      Martha Chun presents to the Facial Pain Clinic  today to consult about complex facial pain.  This [facility] met with the patient at her care team's request, to assess her current behavioral health functioning and needs, and to provide intervention or referral to appropriate resources, as necessary. Martha was seen by other care team members in today's multi-disciplinary clinic, as well. Please see their notes for a complete picture of her care today, and to find the complete care plan.    Martha does have a notable mental health history noted in the chart. She appears to have historical diagnoses of Major Depression, Borderline Personality Disorder, Generalized Anxiety, and Somatic Symptom Disorder. There is note of two hospitalizations for depression and suicidal thinking/attempts - both by overdose. It is noted several places in the chart that she has followed with a therapist and other mental health providers for many years. Her last hospitalization was in June of 2020.    Per a psychiatric evaluation from 10/3/2019, she had had no suicide attempts prior to that time. It appears that her experience of pain and the diminishments in her function due to these challenges are one of the main sources of her depression and emotional distress.     Martha denies any current thoughts of harming herself or of suicide, and says that she has crisis numbers that are something she has used in the past when she feels more intense depression. She reports that she is really working hard to fight to improve her health and wants things to be better. She is currently not working with a therapist, but is open to the idea. She does make use of guided imagery every day for relaxing, and she finds this helpful. She reports that there have been some barriers to getting this sort of care established. She says she was referred to [facility] but that the paperwork was too daunting. Unfortunately she has some other stressors in her life, including an impending separation from her . Most  patients dealing with pain issues might benefit from supportive therapy and/or exploring psychological support for pain management, learning psychological pain management techniques, etc.     We agree to meet in follow-up over the next few weeks to discuss her mental health concerns and the way these concerns may be impacting her physical health and experience of pain, as well as explore resources for ongoing support. She is very interested in meeting for this.     I affirmed the steps the patient has taken to address physical and behavioral health issues, and offered continued behavioral health services or referral, now or in the future, as needed.     Progress on Treatment Objective(s) / Homework:  No treatment plan in place with me.    Medication Review:  Please see medical provider note from today for medication review.    Medication Compliance:  Please see medical provider note from today for medication compliance review..    Changes in Health Issues:  Please see medical provider note from today.    Chemical Use Review:   Substance Use: Not addressed today; no hx of concerns noted in the chart     Tobacco Use: Not addressed today.    Assessment: Current Emotional / Mental Status (status of significant symptoms):  Risk status (Self / Other harm or suicidal ideation)  Patient has had a history of suicidal ideation and there are notes related to two attempts in 2019 and 2020.  Patient denies current fears or concerns for personal safety.  Patient denies current or recent suicidal ideation or behaviors.  Patient denies current or recent homicidal ideation or behaviors.  Patient denies current or recent self injurious behavior or ideation.  Patient denies other safety concerns.  A safety and risk management plan has not been developed at this time, however patient was encouraged to call [facility] should there be a change in any of these risk factors.    Appearance:   Well groomed  Eye  Contact:   Good  Psychomotor Behavior: Normal  Attitude:   Cooperative   Orientation:   All  Speech   Rate / Production: Normal    Volume:  Normal   Mood:    Anxious  Depressed   Affect:    Appropriate   Thought Content:  Clear   Thought Form:  Coherent  Logical   Insight:    Fair     Diagnoses:  1. Moderate episode of recurrent major depressive disorder (H)    Hx of ROSIO, BPD, Somatic symptom    Collateral Reports Completed:  Communicated with: facial pain care team    Plan: (Homework, other):  Martha denies any current thoughts of harming herself or of suicide, and says that she has crisis numbers that are something she has used in the past when she feels more intense depression. She reports that she is really working hard to fight to improve her health and wants things to be better. She is currently not working with a therapist, but is open to the idea. She does make use of guided imagery every day for relaxing, and she finds this helpful. She reports that there have been some barriers to getting this sort of care established. She says she was referred to [facility] but that the paperwork was too daunting. Unfortunately she has some other stressors in her life, including an impending separation from her . Most patients dealing with pain issues might benefit from supportive therapy and/or exploring psychological support for pain management, learning psychological pain management techniques, etc.     We agree to meet in follow-up over the next few weeks to discuss her mental health concerns and the way these concerns may be impacting her physical health and experience of pain, as well as explore resources for ongoing support. She is very interested in meeting for this.       Amando España MA, LMFT  Lead Behavioral Health Clinician  [facility]     [email or web address]                                                             My phone: [phone]  [phone]  [facility]  No Pager: [phone]

## 2021-11-26 NOTE — ED ADULT TRIAGE NOTE - CHIEF COMPLAINT QUOTE
patient reports his urinary catheter is clogged.  patient was seen in ED on Tuesday for same issue, catheter was replaced then.

## 2021-11-26 NOTE — ED PROVIDER NOTE - CLINICAL SUMMARY MEDICAL DECISION MAKING FREE TEXT BOX
Keyon MCGILL: Keyon MCGILL: 81M hx of afib BPH s/p chronic crockett presents with a cc of crockett problem reports crockett was clogged this morning a/w decreased outpt and purulent like drainage, no other complaints. No back pain fever abdominal pain or other urinary sx, limited historian was started on tamsulosin and  Bethanechol Chloride  earlier this week by his urologist on chart review No other complaints. LE swelling is at baseline.  exam vss non toxic PE as above ddx c/f clogged crockett +/- UTI low c/f pyelo plan to check urine, replace Crockett, meds as needed and reassess.

## 2021-11-26 NOTE — ED PROVIDER NOTE - PATIENT PORTAL LINK FT
You can access the FollowMyHealth Patient Portal offered by [facility] by registering at the following website: [email or web address] By joining [facility]’s FollowMyHealth portal, you will also be able to view your health information using other applications (apps) compatible with our system.

## 2021-11-27 ENCOUNTER — APPOINTMENT (OUTPATIENT)
Dept: DISASTER EMERGENCY | Facility: CLINIC | Age: 81
End: 2021-11-27

## 2021-11-27 DIAGNOSIS — Z01.818 ENCOUNTER FOR OTHER PREPROCEDURAL EXAMINATION: ICD-10-CM

## 2021-11-27 LAB
CULTURE RESULTS: SIGNIFICANT CHANGE UP
SPECIMEN SOURCE: SIGNIFICANT CHANGE UP

## 2021-11-30 ENCOUNTER — APPOINTMENT (OUTPATIENT)
Dept: UROLOGY | Facility: CLINIC | Age: 81
End: 2021-11-30
Payer: MEDICARE

## 2021-11-30 VITALS
HEIGHT: 72 IN | WEIGHT: 235 LBS | SYSTOLIC BLOOD PRESSURE: 175 MMHG | DIASTOLIC BLOOD PRESSURE: 93 MMHG | OXYGEN SATURATION: 97 % | HEART RATE: 83 BPM | BODY MASS INDEX: 31.83 KG/M2

## 2021-11-30 PROCEDURE — 52442Z: CUSTOM

## 2021-11-30 PROCEDURE — 52441Z: CUSTOM

## 2021-12-01 ENCOUNTER — APPOINTMENT (OUTPATIENT)
Dept: UROLOGY | Facility: CLINIC | Age: 81
End: 2021-12-01
Payer: MEDICARE

## 2021-12-01 VITALS
HEIGHT: 72 IN | SYSTOLIC BLOOD PRESSURE: 169 MMHG | WEIGHT: 235 LBS | HEART RATE: 115 BPM | RESPIRATION RATE: 96 BRPM | BODY MASS INDEX: 31.83 KG/M2 | DIASTOLIC BLOOD PRESSURE: 114 MMHG

## 2021-12-01 PROCEDURE — 51798 US URINE CAPACITY MEASURE: CPT

## 2021-12-01 PROCEDURE — 51700 IRRIGATION OF BLADDER: CPT

## 2021-12-01 PROCEDURE — A4218: CPT | Mod: NC

## 2021-12-02 ENCOUNTER — APPOINTMENT (OUTPATIENT)
Dept: UROLOGY | Facility: CLINIC | Age: 81
End: 2021-12-02

## 2021-12-06 NOTE — ED ADULT NURSE NOTE - CAS TRG GENERAL AIRWAY, MLM
Azithromycin Pregnancy And Lactation Text: This medication is considered safe during pregnancy and is also secreted in breast milk. Minocycline Counseling: Patient advised regarding possible photosensitivity and discoloration of the teeth, skin, lips, tongue and gums.  Patient instructed to avoid sunlight, if possible.  When exposed to sunlight, patients should wear protective clothing, sunglasses, and sunscreen.  The patient was instructed to call the office immediately if the following severe adverse effects occur:  hearing changes, easy bruising/bleeding, severe headache, or vision changes.  The patient verbalized understanding of the proper use and possible adverse effects of minocycline.  All of the patient's questions and concerns were addressed. High Dose Vitamin A Counseling: Side effects reviewed, pt to contact office should one occur. Spironolactone Counseling: Patient advised regarding risks of diarrhea, abdominal pain, hyperkalemia, birth defects (for female patients), liver toxicity and renal toxicity. The patient may need blood work to monitor liver and kidney function and potassium levels while on therapy. The patient verbalized understanding of the proper use and possible adverse effects of spironolactone.  All of the patient's questions and concerns were addressed. Bactrim Pregnancy And Lactation Text: This medication is Pregnancy Category D and is known to cause fetal risk.  It is also excreted in breast milk. Dapsone Pregnancy And Lactation Text: This medication is Pregnancy Category C and is not considered safe during pregnancy or breast feeding. Birth Control Pills Pregnancy And Lactation Text: This medication should be avoided if pregnant and for the first 30 days post-partum. Patent Erythromycin Pregnancy And Lactation Text: This medication is Pregnancy Category B and is considered safe during pregnancy. It is also excreted in breast milk. Isotretinoin Pregnancy And Lactation Text: This medication is Pregnancy Category X and is considered extremely dangerous during pregnancy. It is unknown if it is excreted in breast milk. Doxycycline Counseling:  Patient counseled regarding possible photosensitivity and increased risk for sunburn.  Patient instructed to avoid sunlight, if possible.  When exposed to sunlight, patients should wear protective clothing, sunglasses, and sunscreen.  The patient was instructed to call the office immediately if the following severe adverse effects occur:  hearing changes, easy bruising/bleeding, severe headache, or vision changes.  The patient verbalized understanding of the proper use and possible adverse effects of doxycycline.  All of the patient's questions and concerns were addressed. Tetracycline Pregnancy And Lactation Text: This medication is Pregnancy Category D and not consider safe during pregnancy. It is also excreted in breast milk. Include Pregnancy/Lactation Warning?: No Erythromycin Counseling:  I discussed with the patient the risks of erythromycin including but not limited to GI upset, allergic reaction, drug rash, diarrhea, increase in liver enzymes, and yeast infections. Topical Clindamycin Counseling: Patient counseled that this medication may cause skin irritation or allergic reactions.  In the event of skin irritation, the patient was advised to reduce the amount of the drug applied or use it less frequently.   The patient verbalized understanding of the proper use and possible adverse effects of clindamycin.  All of the patient's questions and concerns were addressed. Tazorac Counseling:  Patient advised that medication is irritating and drying.  Patient may need to apply sparingly and wash off after an hour before eventually leaving it on overnight.  The patient verbalized understanding of the proper use and possible adverse effects of tazorac.  All of the patient's questions and concerns were addressed. Tetracycline Counseling: Patient counseled regarding possible photosensitivity and increased risk for sunburn.  Patient instructed to avoid sunlight, if possible.  When exposed to sunlight, patients should wear protective clothing, sunglasses, and sunscreen.  The patient was instructed to call the office immediately if the following severe adverse effects occur:  hearing changes, easy bruising/bleeding, severe headache, or vision changes.  The patient verbalized understanding of the proper use and possible adverse effects of tetracycline.  All of the patient's questions and concerns were addressed. Patient understands to avoid pregnancy while on therapy due to potential birth defects. Bactrim Counseling:  I discussed with the patient the risks of sulfa antibiotics including but not limited to GI upset, allergic reaction, drug rash, diarrhea, dizziness, photosensitivity, and yeast infections.  Rarely, more serious reactions can occur including but not limited to aplastic anemia, agranulocytosis, methemoglobinemia, blood dyscrasias, liver or kidney failure, lung infiltrates or desquamative/blistering drug rashes. Doxycycline Pregnancy And Lactation Text: This medication is Pregnancy Category D and not consider safe during pregnancy. It is also excreted in breast milk but is considered safe for shorter treatment courses. Isotretinoin Counseling: Patient should get monthly blood tests, not donate blood, not drive at night if vision affected, not share medication, and not undergo elective surgery for 6 months after tx completed. Side effects reviewed, pt to contact office should one occur. Topical Retinoid Pregnancy And Lactation Text: This medication is Pregnancy Category C. It is unknown if this medication is excreted in breast milk. Benzoyl Peroxide Pregnancy And Lactation Text: This medication is Pregnancy Category C. It is unknown if benzoyl peroxide is excreted in breast milk. Azithromycin Counseling:  I discussed with the patient the risks of azithromycin including but not limited to GI upset, allergic reaction, drug rash, diarrhea, and yeast infections. Topical Clindamycin Pregnancy And Lactation Text: This medication is Pregnancy Category B and is considered safe during pregnancy. It is unknown if it is excreted in breast milk. Topical Sulfur Applications Pregnancy And Lactation Text: This medication is Pregnancy Category C and has an unknown safety profile during pregnancy. It is unknown if this topical medication is excreted in breast milk. Sarecycline Counseling: Patient advised regarding possible photosensitivity and discoloration of the teeth, skin, lips, tongue and gums.  Patient instructed to avoid sunlight, if possible.  When exposed to sunlight, patients should wear protective clothing, sunglasses, and sunscreen.  The patient was instructed to call the office immediately if the following severe adverse effects occur:  hearing changes, easy bruising/bleeding, severe headache, or vision changes.  The patient verbalized understanding of the proper use and possible adverse effects of sarecycline.  All of the patient's questions and concerns were addressed. Birth Control Pills Counseling: Birth Control Pill Counseling: I discussed with the patient the potential side effects of OCPs including but not limited to increased risk of stroke, heart attack, thrombophlebitis, deep venous thrombosis, hepatic adenomas, breast changes, GI upset, headaches, and depression.  The patient verbalized understanding of the proper use and possible adverse effects of OCPs. All of the patient's questions and concerns were addressed. Benzoyl Peroxide Counseling: Patient counseled that medicine may cause skin irritation and bleach clothing.  In the event of skin irritation, the patient was advised to reduce the amount of the drug applied or use it less frequently.   The patient verbalized understanding of the proper use and possible adverse effects of benzoyl peroxide.  All of the patient's questions and concerns were addressed. Topical Sulfur Applications Counseling: Topical Sulfur Counseling: Patient counseled that this medication may cause skin irritation or allergic reactions.  In the event of skin irritation, the patient was advised to reduce the amount of the drug applied or use it less frequently.   The patient verbalized understanding of the proper use and possible adverse effects of topical sulfur application.  All of the patient's questions and concerns were addressed. Tazorac Pregnancy And Lactation Text: This medication is not safe during pregnancy. It is unknown if this medication is excreted in breast milk. High Dose Vitamin A Pregnancy And Lactation Text: High dose vitamin A therapy is contraindicated during pregnancy and breast feeding. Dapsone Counseling: I discussed with the patient the risks of dapsone including but not limited to hemolytic anemia, agranulocytosis, rashes, methemoglobinemia, kidney failure, peripheral neuropathy, headaches, GI upset, and liver toxicity.  Patients who start dapsone require monitoring including baseline LFTs and weekly CBCs for the first month, then every month thereafter.  The patient verbalized understanding of the proper use and possible adverse effects of dapsone.  All of the patient's questions and concerns were addressed. Spironolactone Pregnancy And Lactation Text: This medication can cause feminization of the male fetus and should be avoided during pregnancy. The active metabolite is also found in breast milk. Topical Retinoid counseling:  Patient advised to apply a pea-sized amount only at bedtime and wait 30 minutes after washing their face before applying.  If too drying, patient may add a non-comedogenic moisturizer. The patient verbalized understanding of the proper use and possible adverse effects of retinoids.  All of the patient's questions and concerns were addressed. Detail Level: Zone

## 2021-12-07 ENCOUNTER — APPOINTMENT (OUTPATIENT)
Dept: UROLOGY | Facility: CLINIC | Age: 81
End: 2021-12-07
Payer: MEDICARE

## 2021-12-07 PROCEDURE — 99213 OFFICE O/P EST LOW 20 MIN: CPT

## 2021-12-07 PROCEDURE — 51798 US URINE CAPACITY MEASURE: CPT

## 2021-12-07 NOTE — HISTORY OF PRESENT ILLNESS
[FreeTextEntry1] : 80 yo M s/p UroLift for BPH with urinary retention. He has been voiding now s/p crockett removal. Now with urgency and urge incontinence. PVR 4 mL. No hematuria, no dysuria, no hesitancy, no straining. No incontinence. No fevers, no chills, no nausea, no vomiting, no flank pain.

## 2021-12-07 NOTE — ASSESSMENT
[FreeTextEntry1] : 82 yo M s/p UroLift, now with UUI. Discussed this can be side effect of procedure. samples of myrbetriq given to pt for urgency and UUI.

## 2022-01-01 ENCOUNTER — EMERGENCY (EMERGENCY)
Facility: HOSPITAL | Age: 82
LOS: 0 days | Discharge: HOME | End: 2022-11-28
Attending: EMERGENCY MEDICINE
Payer: MEDICARE

## 2022-01-01 ENCOUNTER — OUTPATIENT (OUTPATIENT)
Dept: OUTPATIENT SERVICES | Facility: HOSPITAL | Age: 82
LOS: 1 days | End: 2022-01-01
Payer: MEDICARE

## 2022-01-01 ENCOUNTER — NON-APPOINTMENT (OUTPATIENT)
Age: 82
End: 2022-01-01

## 2022-01-01 ENCOUNTER — APPOINTMENT (OUTPATIENT)
Dept: ULTRASOUND IMAGING | Facility: CLINIC | Age: 82
End: 2022-01-01

## 2022-01-01 ENCOUNTER — APPOINTMENT (OUTPATIENT)
Dept: UROLOGY | Facility: CLINIC | Age: 82
End: 2022-01-01

## 2022-01-01 ENCOUNTER — INPATIENT (INPATIENT)
Facility: HOSPITAL | Age: 82
LOS: 4 days | Discharge: NURSING, REHAB OR LONG-TERM CARE | DRG: 603 | End: 2023-01-03
Attending: FAMILY MEDICINE | Admitting: EMERGENCY MEDICINE
Payer: MEDICARE

## 2022-01-01 ENCOUNTER — EMERGENCY (EMERGENCY)
Facility: HOSPITAL | Age: 82
LOS: 0 days | Discharge: HOME | End: 2022-12-10
Attending: EMERGENCY MEDICINE
Payer: MEDICARE

## 2022-01-01 ENCOUNTER — EMERGENCY (EMERGENCY)
Facility: HOSPITAL | Age: 82
LOS: 0 days | Discharge: HOME | End: 2022-12-17
Attending: EMERGENCY MEDICINE
Payer: MEDICARE

## 2022-01-01 VITALS — HEIGHT: 72 IN | WEIGHT: 240.08 LBS

## 2022-01-01 VITALS
OXYGEN SATURATION: 97 % | WEIGHT: 240.08 LBS | TEMPERATURE: 98 F | RESPIRATION RATE: 17 BRPM | HEIGHT: 72 IN | HEART RATE: 77 BPM | SYSTOLIC BLOOD PRESSURE: 139 MMHG | DIASTOLIC BLOOD PRESSURE: 95 MMHG

## 2022-01-01 VITALS
HEART RATE: 92 BPM | SYSTOLIC BLOOD PRESSURE: 192 MMHG | OXYGEN SATURATION: 96 % | TEMPERATURE: 98 F | DIASTOLIC BLOOD PRESSURE: 92 MMHG | RESPIRATION RATE: 18 BRPM

## 2022-01-01 VITALS
HEART RATE: 85 BPM | RESPIRATION RATE: 18 BRPM | OXYGEN SATURATION: 96 % | SYSTOLIC BLOOD PRESSURE: 121 MMHG | TEMPERATURE: 98 F | DIASTOLIC BLOOD PRESSURE: 78 MMHG

## 2022-01-01 VITALS — WEIGHT: 229.94 LBS | HEIGHT: 72 IN

## 2022-01-01 VITALS — WEIGHT: 199.96 LBS

## 2022-01-01 DIAGNOSIS — Z79.01 LONG TERM (CURRENT) USE OF ANTICOAGULANTS: ICD-10-CM

## 2022-01-01 DIAGNOSIS — M19.90 UNSPECIFIED OSTEOARTHRITIS, UNSPECIFIED SITE: ICD-10-CM

## 2022-01-01 DIAGNOSIS — E78.5 HYPERLIPIDEMIA, UNSPECIFIED: ICD-10-CM

## 2022-01-01 DIAGNOSIS — Z98.890 OTHER SPECIFIED POSTPROCEDURAL STATES: Chronic | ICD-10-CM
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
## 2022-01-01 DIAGNOSIS — T83.091A OTHER MECHANICAL COMPLICATION OF INDWELLING URETHRAL CATHETER, INITIAL ENCOUNTER: ICD-10-CM

## 2022-01-01 DIAGNOSIS — I10 ESSENTIAL (PRIMARY) HYPERTENSION: ICD-10-CM

## 2022-01-01 DIAGNOSIS — R10.9 UNSPECIFIED ABDOMINAL PAIN: ICD-10-CM

## 2022-01-01 DIAGNOSIS — R33.9 RETENTION OF URINE, UNSPECIFIED: ICD-10-CM

## 2022-01-01 DIAGNOSIS — N40.0 BENIGN PROSTATIC HYPERPLASIA WITHOUT LOWER URINARY TRACT SYMPTOMS: ICD-10-CM

## 2022-01-01 DIAGNOSIS — I48.91 UNSPECIFIED ATRIAL FIBRILLATION: ICD-10-CM

## 2022-01-01 DIAGNOSIS — M16.9 OSTEOARTHRITIS OF HIP, UNSPECIFIED: ICD-10-CM

## 2022-01-01 DIAGNOSIS — N48.89 OTHER SPECIFIED DISORDERS OF PENIS: ICD-10-CM

## 2022-01-01 DIAGNOSIS — K59.09 OTHER CONSTIPATION: ICD-10-CM

## 2022-01-01 DIAGNOSIS — E78.00 PURE HYPERCHOLESTEROLEMIA, UNSPECIFIED: ICD-10-CM
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
## 2022-01-01 DIAGNOSIS — L03.115 CELLULITIS OF RIGHT LOWER LIMB: ICD-10-CM

## 2022-01-01 DIAGNOSIS — M16.10 UNILATERAL PRIMARY OSTEOARTHRITIS, UNSPECIFIED HIP: ICD-10-CM

## 2022-01-01 DIAGNOSIS — T83.028A DISPLACEMENT OF OTHER URINARY CATHETER, INITIAL ENCOUNTER: ICD-10-CM

## 2022-01-01 DIAGNOSIS — H54.8 LEGAL BLINDNESS, AS DEFINED IN USA: ICD-10-CM

## 2022-01-01 DIAGNOSIS — H91.90 UNSPECIFIED HEARING LOSS, UNSPECIFIED EAR: ICD-10-CM

## 2022-01-01 DIAGNOSIS — N40.1 BENIGN PROSTATIC HYPERPLASIA WITH LOWER URINARY TRACT SYMPTOMS: ICD-10-CM

## 2022-01-01 DIAGNOSIS — R39.198 OTHER DIFFICULTIES WITH MICTURITION: ICD-10-CM

## 2022-01-01 LAB
ALBUMIN SERPL ELPH-MCNC: 2.5 G/DL — LOW (ref 3.3–5)
ALBUMIN SERPL ELPH-MCNC: 3.2 G/DL — LOW (ref 3.3–5)
ALP SERPL-CCNC: 106 U/L — SIGNIFICANT CHANGE UP (ref 40–120)
ALP SERPL-CCNC: 90 U/L — SIGNIFICANT CHANGE UP (ref 40–120)
ALT FLD-CCNC: 13 U/L — SIGNIFICANT CHANGE UP (ref 12–78)
ALT FLD-CCNC: 19 U/L — SIGNIFICANT CHANGE UP (ref 12–78)
ANION GAP SERPL CALC-SCNC: 5 MMOL/L — SIGNIFICANT CHANGE UP (ref 5–17)
ANION GAP SERPL CALC-SCNC: 5 MMOL/L — SIGNIFICANT CHANGE UP (ref 5–17)
APPEARANCE UR: ABNORMAL
APPEARANCE UR: CLEAR — SIGNIFICANT CHANGE UP
APTT BLD: 32.2 SEC — SIGNIFICANT CHANGE UP (ref 27.5–35.5)
AST SERPL-CCNC: 18 U/L — SIGNIFICANT CHANGE UP (ref 15–37)
AST SERPL-CCNC: 19 U/L — SIGNIFICANT CHANGE UP (ref 15–37)
BACTERIA # UR AUTO: ABNORMAL
BACTERIA UR CULT: NORMAL
BASOPHILS # BLD AUTO: 0.02 K/UL — SIGNIFICANT CHANGE UP (ref 0–0.2)
BASOPHILS # BLD AUTO: 0.03 K/UL — SIGNIFICANT CHANGE UP (ref 0–0.2)
BASOPHILS NFR BLD AUTO: 0.4 % — SIGNIFICANT CHANGE UP (ref 0–2)
BASOPHILS NFR BLD AUTO: 0.7 % — SIGNIFICANT CHANGE UP (ref 0–2)
BILIRUB SERPL-MCNC: 0.7 MG/DL — SIGNIFICANT CHANGE UP (ref 0.2–1.2)
BILIRUB SERPL-MCNC: 0.9 MG/DL — SIGNIFICANT CHANGE UP (ref 0.2–1.2)
BILIRUB UR-MCNC: NEGATIVE — SIGNIFICANT CHANGE UP
BILIRUB UR-MCNC: NEGATIVE — SIGNIFICANT CHANGE UP
BUN SERPL-MCNC: 15 MG/DL — SIGNIFICANT CHANGE UP (ref 7–23)
BUN SERPL-MCNC: 22 MG/DL — SIGNIFICANT CHANGE UP (ref 7–23)
CALCIUM SERPL-MCNC: 8.9 MG/DL — SIGNIFICANT CHANGE UP (ref 8.5–10.1)
CALCIUM SERPL-MCNC: 9.7 MG/DL — SIGNIFICANT CHANGE UP (ref 8.5–10.1)
CHLORIDE SERPL-SCNC: 102 MMOL/L — SIGNIFICANT CHANGE UP (ref 96–108)
CHLORIDE SERPL-SCNC: 104 MMOL/L — SIGNIFICANT CHANGE UP (ref 96–108)
CO2 SERPL-SCNC: 25 MMOL/L — SIGNIFICANT CHANGE UP (ref 22–31)
CO2 SERPL-SCNC: 28 MMOL/L — SIGNIFICANT CHANGE UP (ref 22–31)
COLOR SPEC: ABNORMAL
COLOR SPEC: YELLOW — SIGNIFICANT CHANGE UP
COMMENT - URINE: SIGNIFICANT CHANGE UP
CREAT SERPL-MCNC: 0.66 MG/DL — SIGNIFICANT CHANGE UP (ref 0.5–1.3)
CREAT SERPL-MCNC: 0.93 MG/DL — SIGNIFICANT CHANGE UP (ref 0.5–1.3)
CULTURE RESULTS: NO GROWTH — SIGNIFICANT CHANGE UP
CULTURE RESULTS: SIGNIFICANT CHANGE UP
DIFF PNL FLD: ABNORMAL
DIFF PNL FLD: ABNORMAL
EGFR: 81 ML/MIN/1.73M2 — SIGNIFICANT CHANGE UP
EGFR: 93 ML/MIN/1.73M2 — SIGNIFICANT CHANGE UP
EOSINOPHIL # BLD AUTO: 0.1 K/UL — SIGNIFICANT CHANGE UP (ref 0–0.5)
EOSINOPHIL # BLD AUTO: 0.14 K/UL — SIGNIFICANT CHANGE UP (ref 0–0.5)
EOSINOPHIL NFR BLD AUTO: 2 % — SIGNIFICANT CHANGE UP (ref 0–6)
EOSINOPHIL NFR BLD AUTO: 3.1 % — SIGNIFICANT CHANGE UP (ref 0–6)
EPI CELLS # UR: SIGNIFICANT CHANGE UP
FLUAV AG NPH QL: SIGNIFICANT CHANGE UP
FLUBV AG NPH QL: SIGNIFICANT CHANGE UP
GLUCOSE SERPL-MCNC: 92 MG/DL — SIGNIFICANT CHANGE UP (ref 70–99)
GLUCOSE SERPL-MCNC: 98 MG/DL — SIGNIFICANT CHANGE UP (ref 70–99)
GLUCOSE UR QL: NEGATIVE — SIGNIFICANT CHANGE UP
GLUCOSE UR QL: NEGATIVE — SIGNIFICANT CHANGE UP
HCT VFR BLD CALC: 41.8 % — SIGNIFICANT CHANGE UP (ref 39–50)
HCT VFR BLD CALC: 44.8 % — SIGNIFICANT CHANGE UP (ref 39–50)
HGB BLD-MCNC: 14.1 G/DL — SIGNIFICANT CHANGE UP (ref 13–17)
HGB BLD-MCNC: 14.7 G/DL — SIGNIFICANT CHANGE UP (ref 13–17)
IMM GRANULOCYTES NFR BLD AUTO: 0.2 % — SIGNIFICANT CHANGE UP (ref 0–0.9)
IMM GRANULOCYTES NFR BLD AUTO: 0.2 % — SIGNIFICANT CHANGE UP (ref 0–0.9)
INR BLD: 1.11 RATIO — SIGNIFICANT CHANGE UP (ref 0.88–1.16)
KETONES UR-MCNC: ABNORMAL
KETONES UR-MCNC: NEGATIVE — SIGNIFICANT CHANGE UP
LACTATE SERPL-SCNC: 1 MMOL/L — SIGNIFICANT CHANGE UP (ref 0.7–2)
LEUKOCYTE ESTERASE UR-ACNC: ABNORMAL
LEUKOCYTE ESTERASE UR-ACNC: ABNORMAL
LYMPHOCYTES # BLD AUTO: 0.61 K/UL — LOW (ref 1–3.3)
LYMPHOCYTES # BLD AUTO: 1.06 K/UL — SIGNIFICANT CHANGE UP (ref 1–3.3)
LYMPHOCYTES # BLD AUTO: 12.3 % — LOW (ref 13–44)
LYMPHOCYTES # BLD AUTO: 23.5 % — SIGNIFICANT CHANGE UP (ref 13–44)
MCHC RBC-ENTMCNC: 31.9 PG — SIGNIFICANT CHANGE UP (ref 27–34)
MCHC RBC-ENTMCNC: 32.1 PG — SIGNIFICANT CHANGE UP (ref 27–34)
MCHC RBC-ENTMCNC: 32.8 GM/DL — SIGNIFICANT CHANGE UP (ref 32–36)
MCHC RBC-ENTMCNC: 33.7 GM/DL — SIGNIFICANT CHANGE UP (ref 32–36)
MCV RBC AUTO: 95.2 FL — SIGNIFICANT CHANGE UP (ref 80–100)
MCV RBC AUTO: 97.2 FL — SIGNIFICANT CHANGE UP (ref 80–100)
MONOCYTES # BLD AUTO: 0.53 K/UL — SIGNIFICANT CHANGE UP (ref 0–0.9)
MONOCYTES # BLD AUTO: 0.57 K/UL — SIGNIFICANT CHANGE UP (ref 0–0.9)
MONOCYTES NFR BLD AUTO: 11.5 % — SIGNIFICANT CHANGE UP (ref 2–14)
MONOCYTES NFR BLD AUTO: 11.7 % — SIGNIFICANT CHANGE UP (ref 2–14)
NEUTROPHILS # BLD AUTO: 2.75 K/UL — SIGNIFICANT CHANGE UP (ref 1.8–7.4)
NEUTROPHILS # BLD AUTO: 3.65 K/UL — SIGNIFICANT CHANGE UP (ref 1.8–7.4)
NEUTROPHILS NFR BLD AUTO: 60.8 % — SIGNIFICANT CHANGE UP (ref 43–77)
NEUTROPHILS NFR BLD AUTO: 73.6 % — SIGNIFICANT CHANGE UP (ref 43–77)
NITRITE UR-MCNC: NEGATIVE — SIGNIFICANT CHANGE UP
NITRITE UR-MCNC: POSITIVE
PH UR: 5 — SIGNIFICANT CHANGE UP (ref 5–8)
PH UR: 7 — SIGNIFICANT CHANGE UP (ref 5–8)
PLATELET # BLD AUTO: 208 K/UL — SIGNIFICANT CHANGE UP (ref 150–400)
PLATELET # BLD AUTO: 229 K/UL — SIGNIFICANT CHANGE UP (ref 150–400)
POTASSIUM SERPL-MCNC: 4.2 MMOL/L — SIGNIFICANT CHANGE UP (ref 3.5–5.3)
POTASSIUM SERPL-MCNC: 4.7 MMOL/L — SIGNIFICANT CHANGE UP (ref 3.5–5.3)
POTASSIUM SERPL-SCNC: 4.2 MMOL/L — SIGNIFICANT CHANGE UP (ref 3.5–5.3)
POTASSIUM SERPL-SCNC: 4.7 MMOL/L — SIGNIFICANT CHANGE UP (ref 3.5–5.3)
PROT SERPL-MCNC: 6.7 GM/DL — SIGNIFICANT CHANGE UP (ref 6–8.3)
PROT SERPL-MCNC: 8.2 GM/DL — SIGNIFICANT CHANGE UP (ref 6–8.3)
PROT UR-MCNC: 100
PROT UR-MCNC: 30 MG/DL
PROTHROM AB SERPL-ACNC: 12.9 SEC — SIGNIFICANT CHANGE UP (ref 10.5–13.4)
RBC # BLD: 4.39 M/UL — SIGNIFICANT CHANGE UP (ref 4.2–5.8)
RBC # BLD: 4.61 M/UL — SIGNIFICANT CHANGE UP (ref 4.2–5.8)
RBC # FLD: 12.5 % — SIGNIFICANT CHANGE UP (ref 10.3–14.5)
RBC # FLD: 12.7 % — SIGNIFICANT CHANGE UP (ref 10.3–14.5)
RBC CASTS # UR COMP ASSIST: ABNORMAL /HPF (ref 0–4)
RSV RNA NPH QL NAA+NON-PROBE: SIGNIFICANT CHANGE UP
SARS-COV-2 RNA SPEC QL NAA+PROBE: SIGNIFICANT CHANGE UP
SODIUM SERPL-SCNC: 134 MMOL/L — LOW (ref 135–145)
SODIUM SERPL-SCNC: 135 MMOL/L — SIGNIFICANT CHANGE UP (ref 135–145)
SP GR SPEC: 1.01 — SIGNIFICANT CHANGE UP (ref 1.01–1.02)
SP GR SPEC: 1.02 — SIGNIFICANT CHANGE UP (ref 1.01–1.02)
SPECIMEN SOURCE: SIGNIFICANT CHANGE UP
SPECIMEN SOURCE: SIGNIFICANT CHANGE UP
UROBILINOGEN FLD QL: 1
UROBILINOGEN FLD QL: NEGATIVE — SIGNIFICANT CHANGE UP
WBC # BLD: 4.52 K/UL — SIGNIFICANT CHANGE UP (ref 3.8–10.5)
WBC # BLD: 4.96 K/UL — SIGNIFICANT CHANGE UP (ref 3.8–10.5)
WBC # FLD AUTO: 4.52 K/UL — SIGNIFICANT CHANGE UP (ref 3.8–10.5)
WBC # FLD AUTO: 4.96 K/UL — SIGNIFICANT CHANGE UP (ref 3.8–10.5)
WBC UR QL: ABNORMAL /HPF (ref 0–5)

## 2022-01-01 PROCEDURE — 36415 COLL VENOUS BLD VENIPUNCTURE: CPT

## 2022-01-01 PROCEDURE — 93970 EXTREMITY STUDY: CPT | Mod: 26

## 2022-01-01 PROCEDURE — 97162 PT EVAL MOD COMPLEX 30 MIN: CPT | Mod: GP

## 2022-01-01 PROCEDURE — 81001 URINALYSIS AUTO W/SCOPE: CPT

## 2022-01-01 PROCEDURE — 76700 US EXAM ABDOM COMPLETE: CPT | Mod: 26

## 2022-01-01 PROCEDURE — U0003: CPT

## 2022-01-01 PROCEDURE — 12345: CPT | Mod: NC

## 2022-01-01 PROCEDURE — U0005: CPT

## 2022-01-01 PROCEDURE — 99283 EMERGENCY DEPT VISIT LOW MDM: CPT

## 2022-01-01 PROCEDURE — 76856 US EXAM PELVIC COMPLETE: CPT

## 2022-01-01 PROCEDURE — 99283 EMERGENCY DEPT VISIT LOW MDM: CPT | Mod: FS

## 2022-01-01 PROCEDURE — 51702 INSERT TEMP BLADDER CATH: CPT

## 2022-01-01 PROCEDURE — 76700 US EXAM ABDOM COMPLETE: CPT

## 2022-01-01 PROCEDURE — 87086 URINE CULTURE/COLONY COUNT: CPT

## 2022-01-01 PROCEDURE — 99213 OFFICE O/P EST LOW 20 MIN: CPT | Mod: 25

## 2022-01-01 PROCEDURE — 99222 1ST HOSP IP/OBS MODERATE 55: CPT

## 2022-01-01 PROCEDURE — 97116 GAIT TRAINING THERAPY: CPT | Mod: GP

## 2022-01-01 PROCEDURE — 99285 EMERGENCY DEPT VISIT HI MDM: CPT | Mod: FS

## 2022-01-01 PROCEDURE — 99213 OFFICE O/P EST LOW 20 MIN: CPT

## 2022-01-01 PROCEDURE — 99283 EMERGENCY DEPT VISIT LOW MDM: CPT | Mod: 25

## 2022-01-01 PROCEDURE — 85027 COMPLETE CBC AUTOMATED: CPT

## 2022-01-01 PROCEDURE — 85025 COMPLETE CBC W/AUTO DIFF WBC: CPT

## 2022-01-01 PROCEDURE — 76856 US EXAM PELVIC COMPLETE: CPT | Mod: 26

## 2022-01-01 PROCEDURE — 99233 SBSQ HOSP IP/OBS HIGH 50: CPT

## 2022-01-01 PROCEDURE — 80053 COMPREHEN METABOLIC PANEL: CPT

## 2022-01-01 PROCEDURE — 80048 BASIC METABOLIC PNL TOTAL CA: CPT

## 2022-01-01 PROCEDURE — 93005 ELECTROCARDIOGRAM TRACING: CPT

## 2022-01-01 PROCEDURE — 93010 ELECTROCARDIOGRAM REPORT: CPT

## 2022-01-01 RX ORDER — CEFTRIAXONE 500 MG/1
1000 INJECTION, POWDER, FOR SOLUTION INTRAMUSCULAR; INTRAVENOUS ONCE
Refills: 0 | Status: COMPLETED | OUTPATIENT
Start: 2022-01-01 | End: 2022-01-01

## 2022-01-01 RX ORDER — LANOLIN ALCOHOL/MO/W.PET/CERES
3 CREAM (GRAM) TOPICAL AT BEDTIME
Refills: 0 | Status: DISCONTINUED | OUTPATIENT
Start: 2022-01-01 | End: 2023-01-01

## 2022-01-01 RX ORDER — DIAZEPAM 5 MG/1
5 TABLET ORAL
Qty: 2 | Refills: 0 | Status: COMPLETED | COMMUNITY
Start: 2021-11-23 | End: 2022-01-01

## 2022-01-01 RX ORDER — CEFEPIME 1 G/1
2000 INJECTION, POWDER, FOR SOLUTION INTRAMUSCULAR; INTRAVENOUS EVERY 12 HOURS
Refills: 0 | Status: DISCONTINUED | OUTPATIENT
Start: 2022-01-01 | End: 2023-01-01

## 2022-01-01 RX ORDER — VANCOMYCIN HCL 1 G
1500 VIAL (EA) INTRAVENOUS EVERY 12 HOURS
Refills: 0 | Status: DISCONTINUED | OUTPATIENT
Start: 2022-01-01 | End: 2022-01-01

## 2022-01-01 RX ORDER — ACETAMINOPHEN 500 MG
1000 TABLET ORAL ONCE
Refills: 0 | Status: COMPLETED | OUTPATIENT
Start: 2022-01-01 | End: 2022-01-01

## 2022-01-01 RX ORDER — FESOTERODINE FUMARATE 4 MG/1
4 TABLET, FILM COATED, EXTENDED RELEASE ORAL
Qty: 30 | Refills: 5 | Status: ACTIVE | COMMUNITY
Start: 2022-01-01 | End: 1900-01-01

## 2022-01-01 RX ORDER — NITROFURANTOIN (MONOHYDRATE/MACROCRYSTALS) 25; 75 MG/1; MG/1
100 CAPSULE ORAL
Qty: 14 | Refills: 0 | Status: COMPLETED | COMMUNITY
Start: 2021-10-12 | End: 2022-01-01

## 2022-01-01 RX ORDER — ACETAMINOPHEN 500 MG
650 TABLET ORAL EVERY 6 HOURS
Refills: 0 | Status: DISCONTINUED | OUTPATIENT
Start: 2022-01-01 | End: 2023-01-01

## 2022-01-01 RX ORDER — CEFTRIAXONE 500 MG/1
1000 INJECTION, POWDER, FOR SOLUTION INTRAMUSCULAR; INTRAVENOUS ONCE
Refills: 0 | Status: DISCONTINUED | OUTPATIENT
Start: 2022-01-01 | End: 2022-01-01

## 2022-01-01 RX ORDER — CIPROFLOXACIN HYDROCHLORIDE 500 MG/1
500 TABLET, FILM COATED ORAL TWICE DAILY
Qty: 10 | Refills: 0 | Status: ACTIVE | COMMUNITY
Start: 2022-01-01 | End: 1900-01-01

## 2022-01-01 RX ORDER — CEFEPIME 1 G/1
INJECTION, POWDER, FOR SOLUTION INTRAMUSCULAR; INTRAVENOUS
Refills: 0 | Status: DISCONTINUED | OUTPATIENT
Start: 2022-01-01 | End: 2023-01-01

## 2022-01-01 RX ORDER — VANCOMYCIN HCL 1 G
1500 VIAL (EA) INTRAVENOUS ONCE
Refills: 0 | Status: COMPLETED | OUTPATIENT
Start: 2022-01-01 | End: 2022-01-01

## 2022-01-01 RX ORDER — SODIUM CHLORIDE 9 MG/ML
1000 INJECTION INTRAMUSCULAR; INTRAVENOUS; SUBCUTANEOUS ONCE
Refills: 0 | Status: COMPLETED | OUTPATIENT
Start: 2022-01-01 | End: 2022-01-01

## 2022-01-01 RX ORDER — ONDANSETRON 8 MG/1
4 TABLET, FILM COATED ORAL EVERY 8 HOURS
Refills: 0 | Status: DISCONTINUED | OUTPATIENT
Start: 2022-01-01 | End: 2023-01-01

## 2022-01-01 RX ORDER — CIPROFLOXACIN HYDROCHLORIDE 500 MG/1
500 TABLET, FILM COATED ORAL
Qty: 14 | Refills: 0 | Status: COMPLETED | COMMUNITY
Start: 2021-11-17 | End: 2022-01-01

## 2022-01-01 RX ORDER — CEPHALEXIN 500 MG
500 CAPSULE ORAL ONCE
Refills: 0 | Status: COMPLETED | OUTPATIENT
Start: 2022-01-01 | End: 2022-01-01

## 2022-01-01 RX ORDER — METOPROLOL TARTRATE 50 MG
100 TABLET ORAL DAILY
Refills: 0 | Status: DISCONTINUED | OUTPATIENT
Start: 2022-01-01 | End: 2023-01-01

## 2022-01-01 RX ORDER — CEFPODOXIME PROXETIL 100 MG
1 TABLET ORAL
Qty: 14 | Refills: 0
Start: 2022-01-01 | End: 2022-01-01

## 2022-01-01 RX ORDER — DIPHENHYDRAMINE HCL 50 MG
25 CAPSULE ORAL AT BEDTIME
Refills: 0 | Status: DISCONTINUED | OUTPATIENT
Start: 2022-01-01 | End: 2023-01-01

## 2022-01-01 RX ORDER — NITROFURANTOIN MACROCRYSTALS 100 MG/1
100 CAPSULE ORAL 3 TIMES DAILY
Qty: 21 | Refills: 0 | Status: ACTIVE | COMMUNITY
Start: 2022-01-01 | End: 1900-01-01

## 2022-01-01 RX ORDER — APIXABAN 2.5 MG/1
5 TABLET, FILM COATED ORAL EVERY 12 HOURS
Refills: 0 | Status: DISCONTINUED | OUTPATIENT
Start: 2022-01-01 | End: 2023-01-01

## 2022-01-01 RX ORDER — CEFEPIME 1 G/1
2000 INJECTION, POWDER, FOR SOLUTION INTRAMUSCULAR; INTRAVENOUS ONCE
Refills: 0 | Status: COMPLETED | OUTPATIENT
Start: 2022-01-01 | End: 2022-01-01

## 2022-01-01 RX ADMIN — Medication 100 MILLIGRAM(S): at 23:15

## 2022-01-01 RX ADMIN — Medication 500 MILLIGRAM(S): at 17:34

## 2022-01-01 RX ADMIN — Medication 100 MILLIGRAM(S): at 09:26

## 2022-01-01 RX ADMIN — CEFTRIAXONE 1000 MILLIGRAM(S): 500 INJECTION, POWDER, FOR SOLUTION INTRAMUSCULAR; INTRAVENOUS at 16:26

## 2022-01-01 RX ADMIN — Medication 300 MILLIGRAM(S): at 16:52

## 2022-01-01 RX ADMIN — Medication 300 MILLIGRAM(S): at 10:05

## 2022-01-01 RX ADMIN — APIXABAN 5 MILLIGRAM(S): 2.5 TABLET, FILM COATED ORAL at 22:04

## 2022-01-01 RX ADMIN — Medication 3 MILLIGRAM(S): at 23:18

## 2022-01-01 RX ADMIN — CEFEPIME 100 MILLIGRAM(S): 1 INJECTION, POWDER, FOR SOLUTION INTRAMUSCULAR; INTRAVENOUS at 17:58

## 2022-01-01 RX ADMIN — APIXABAN 5 MILLIGRAM(S): 2.5 TABLET, FILM COATED ORAL at 09:26

## 2022-01-01 RX ADMIN — Medication 100 MILLIGRAM(S): at 22:04

## 2022-01-01 RX ADMIN — Medication 1000 MILLIGRAM(S): at 16:51

## 2022-01-01 RX ADMIN — Medication 100 MILLIGRAM(S): at 09:25

## 2022-01-01 RX ADMIN — CEFEPIME 100 MILLIGRAM(S): 1 INJECTION, POWDER, FOR SOLUTION INTRAMUSCULAR; INTRAVENOUS at 17:35

## 2022-01-01 RX ADMIN — SODIUM CHLORIDE 1000 MILLILITER(S): 9 INJECTION INTRAMUSCULAR; INTRAVENOUS; SUBCUTANEOUS at 16:25

## 2022-01-01 RX ADMIN — Medication 25 MILLIGRAM(S): at 23:15

## 2022-01-01 RX ADMIN — CEFEPIME 100 MILLIGRAM(S): 1 INJECTION, POWDER, FOR SOLUTION INTRAMUSCULAR; INTRAVENOUS at 06:16

## 2022-01-01 RX ADMIN — APIXABAN 5 MILLIGRAM(S): 2.5 TABLET, FILM COATED ORAL at 23:15

## 2022-01-01 RX ADMIN — CEFEPIME 100 MILLIGRAM(S): 1 INJECTION, POWDER, FOR SOLUTION INTRAMUSCULAR; INTRAVENOUS at 05:35

## 2022-04-28 LAB
APPEARANCE: CLEAR
BACTERIA: NEGATIVE
BILIRUBIN URINE: NEGATIVE
BLOOD URINE: NORMAL
COLOR: YELLOW
GLUCOSE QUALITATIVE U: NEGATIVE
HYALINE CASTS: 5 /LPF
KETONES URINE: NEGATIVE
LEUKOCYTE ESTERASE URINE: ABNORMAL
MICROSCOPIC-UA: NORMAL
NITRITE URINE: NEGATIVE
PH URINE: 6.5
PROTEIN URINE: ABNORMAL
PSA SERPL-MCNC: 5.72 NG/ML
RED BLOOD CELLS URINE: 19 /HPF
SPECIFIC GRAVITY URINE: 1.02
SQUAMOUS EPITHELIAL CELLS: 3 /HPF
UROBILINOGEN URINE: ABNORMAL
WHITE BLOOD CELLS URINE: 22 /HPF

## 2022-04-29 LAB — URINE CYTOLOGY: NORMAL

## 2022-05-03 NOTE — PHYSICAL THERAPY INITIAL EVALUATION ADULT - WORK/LEISURE ACTIVITY, REHAB EVAL
Hx of proteinuria, UPCr >7, nephrotic range    - consider ACEi or SGLT-2 inhibitor at discharge     independent/pt still works a lot and reports he is on his feet shea doing whatever needs to be done

## 2022-06-16 ENCOUNTER — NON-APPOINTMENT (OUTPATIENT)
Age: 82
End: 2022-06-16

## 2022-06-16 NOTE — ED ADULT NURSE NOTE - NS ED NURSE RECORD ANOTHER HT AND WT
Pt resting as manifested by eyes closed in dark room. Respirations even and easy. Call light and bedside table in reach. Bed in low locked position. Denies any needs at this time. Yes

## 2022-06-22 ENCOUNTER — RX RENEWAL (OUTPATIENT)
Age: 82
End: 2022-06-22

## 2022-07-05 ENCOUNTER — APPOINTMENT (OUTPATIENT)
Dept: UROLOGY | Facility: CLINIC | Age: 82
End: 2022-07-05

## 2022-07-05 VITALS
DIASTOLIC BLOOD PRESSURE: 100 MMHG | OXYGEN SATURATION: 97 % | BODY MASS INDEX: 31.42 KG/M2 | HEIGHT: 72 IN | WEIGHT: 232 LBS | SYSTOLIC BLOOD PRESSURE: 167 MMHG | HEART RATE: 78 BPM

## 2022-07-05 DIAGNOSIS — R97.20 ELEVATED PROSTATE, SPECIFIC ANTIGEN [PSA]: ICD-10-CM

## 2022-07-05 PROCEDURE — 99214 OFFICE O/P EST MOD 30 MIN: CPT

## 2022-07-05 NOTE — HISTORY OF PRESENT ILLNESS
[FreeTextEntry1] : 81M presents today with a CC of a PSA elevation to over 6. Pt recently had a urolift procedure but had a mildly elevated PSA prior to this.

## 2022-07-07 LAB
PSA FREE FLD-MCNC: 22 %
PSA FREE SERPL-MCNC: 1.35 NG/ML
PSA SERPL-MCNC: 6.2 NG/ML

## 2022-07-08 ENCOUNTER — NON-APPOINTMENT (OUTPATIENT)
Age: 82
End: 2022-07-08

## 2022-08-17 NOTE — ED PROVIDER NOTE - NSTIMEPROVIDERCAREINITIATE_GEN_ER
15-Jul-2019 10:12 Bed in lowest position, wheels locked, appropriate side rails in place/Call bell, personal items and telephone in reach/Instruct patient to call for assistance before getting out of bed or chair/Non-slip footwear when patient is out of bed/[place] to call system/Physically safe environment - no spills, clutter or unnecessary equipment/Purposeful Proactive Rounding/Room/bathroom lighting operational, light cord in reach

## 2022-10-07 ENCOUNTER — APPOINTMENT (OUTPATIENT)
Dept: UROLOGY | Facility: CLINIC | Age: 82
End: 2022-10-07

## 2022-10-07 ENCOUNTER — NON-APPOINTMENT (OUTPATIENT)
Age: 82
End: 2022-10-07

## 2022-10-07 PROCEDURE — 51798 US URINE CAPACITY MEASURE: CPT

## 2022-10-07 PROCEDURE — 99214 OFFICE O/P EST MOD 30 MIN: CPT | Mod: 25

## 2022-10-07 NOTE — END OF VISIT
[FreeTextEntry3] : I advised the patient that he should be taken to the emergency room but he refused he will give himself a fleets enema and following this we will try milk of magnesia.  His urine was sent and he was given a course of antibiotics as well.  Plans to follow.

## 2022-10-07 NOTE — HISTORY OF PRESENT ILLNESS
[FreeTextEntry1] : This patient had a UroLift procedure and presents today with a several day history of loss of stool and urine.  He states that he has been constipated and has not had a bowel movement in 4 days.  His postvoid residual today was 50 cc

## 2022-10-07 NOTE — REVIEW OF SYSTEMS
[Eyesight Problems] : eyesight problems [see HPI] : see HPI [Urine retention] : urine retention [Negative] : Heme/Lymph [FreeTextEntry2] : High blood pressure

## 2022-10-07 NOTE — PHYSICAL EXAM
[General Appearance - Well Developed] : well developed [General Appearance - Well Nourished] : well nourished [Normal Appearance] : normal appearance [Well Groomed] : well groomed [General Appearance - In No Acute Distress] : no acute distress [Abdomen Soft] : soft [Abdomen Tenderness] : non-tender [Costovertebral Angle Tenderness] : no ~M costovertebral angle tenderness [Urethral Meatus] : meatus normal [Urinary Bladder Findings] : the bladder was normal on palpation [Scrotum] : the scrotum was normal [Testes Mass (___cm)] : there were no testicular masses [No Prostate Nodules] : no prostate nodules [FreeTextEntry1] : Moderate in size and palpably beingn [] : no respiratory distress [Edema] : no peripheral edema [Respiration, Rhythm And Depth] : normal respiratory rhythm and effort [Exaggerated Use Of Accessory Muscles For Inspiration] : no accessory muscle use [Oriented To Time, Place, And Person] : oriented to person, place, and time [Affect] : the affect was normal [Mood] : the mood was normal [Not Anxious] : not anxious [Normal Station and Gait] : the gait and station were normal for the patient's age [No Focal Deficits] : no focal deficits [No Palpable Adenopathy] : no palpable adenopathy

## 2022-10-10 PROBLEM — K59.09 CHRONIC CONSTIPATION: Status: ACTIVE | Noted: 2022-10-07

## 2022-11-07 NOTE — HISTORY OF PRESENT ILLNESS
[FreeTextEntry1] : 81M presents today with a CC of difficulties with catheter apparatus. Pt states that his leg bag became disconnected from his catheter and that his elastic bands are fraying on his leg bag.

## 2022-11-07 NOTE — END OF VISIT
[FreeTextEntry3] : We switched him to a belly bag and explained this to him. Hopefully all will be satisfactory.

## 2022-11-07 NOTE — END OF VISIT
[FreeTextEntry3] : I recommended to the patient that we try a Goode Catheter for some time and he was much in favor of this.

## 2022-11-07 NOTE — HISTORY OF PRESENT ILLNESS
[FreeTextEntry1] : 81M presents today with a CC of chronic urinary incontinence. Pt is here to discuss remedies for his chronic incontinence. \par

## 2022-11-27 NOTE — ED ADULT TRIAGE NOTE - CHIEF COMPLAINT QUOTE
Pt c/o crockett catheter problems. Pt states he thinks he pulled it out when he got out of his truck. "it is not draining anymore since this afternoon". Denies other complaints.

## 2022-11-28 NOTE — ED ADULT NURSE NOTE - SCORE
Called and talked to mom, per mom the back pain is off and on. She is fine with either, the back donal at  or different outpatient PT, she just wants to get him established and looked at soon because spring training starts soon. She said whatever you thought was the best choice. 1

## 2022-11-28 NOTE — ED PROVIDER NOTE - NSFOLLOWUPINSTRUCTIONS_ED_ALL_ED_FT
Follow up with Dr. Godfrey.  Continue your regular medications as per routine.        Goode Catheter Placement and Care    WHAT YOU NEED TO KNOW:    A Goode catheter is a sterile tube that is inserted into your bladder to drain urine. It is also called an indwelling urinary catheter.     DISCHARGE INSTRUCTIONS:    Return to the emergency department if:   •Your catheter comes out.       •You suddenly have material that looks like sand in the tubing or drainage bag.      •No urine is draining into the bag and you have checked the system.      •You have pain in your hip, back, pelvis, or lower abdomen.      •You are confused or cannot think clearly.      Call your doctor or urologist if:   •You have a fever.      •You have bladder spasms for more than 1 day after the catheter is placed.      •You see blood in the tubing or drainage bag.      •You have a rash or itching where the catheter tube is secured to your skin.      •Urine leaks from or around the catheter, tubing, or drainage bag.      •The closed drainage system has accidently come open or apart.       •You see a layer of crystals inside the tubing.      •You have questions or concerns about your condition or care.      Care for your catheter and drainage bag: You can reduce your risk for infection and injury by caring for your catheter and drainage bag properly.  •Wash your hands often. Wash before and after you touch your catheter, tubing, or drainage bag. Use soap and water. Wear clean disposable gloves when you care for your catheter or disconnect the drainage bag. Wash your hands before you prepare or eat food.   Handwashing           •Clean your genital area 2 times every day. Clean your catheter area and anal opening after every bowel movement. ?For men: Use a soapy cloth to clean the tip of your penis. Start where the catheter enters. Wipe backward making sure to pull back the foreskin. Then use a cloth with clear water in the same direction to clean away the soap.       ?For women: Use a soapy cloth to clean the area that the catheter enters your body. Make sure to separate your labia and wipe toward the anus. Then use a cloth with clear water and wipe in the same direction.       •Secure the catheter tube so you do not pull or move the catheter. This helps prevent pain and bladder spasms. Healthcare providers will show you how to use medical tape or a strap to secure the catheter tube to your body.       •Keep a closed drainage system. Your catheter should always be attached to the drainage bag to form a closed system. Do not disconnect any part of the closed system unless you need to change the bag.      •Keep the drainage bag below the level of your waist. This helps stop urine from moving back up the tubing and into your bladder. Do not loop or kink the tubing. This can cause urine to back up and collect in your bladder. Do not let the drainage bag touch or lie on the floor.      •Empty the drainage bag when needed. The weight of a full drainage bag can be painful. Empty the drainage bag every 3 to 6 hours or when it is ? full.       •Clean and change the drainage bag as directed. Ask your healthcare provider how often you should change the drainage bag and what cleaning solution to use. Wear disposable gloves when you change the bag. Do not allow the end of the catheter or tubing to touch anything. Clean the ends with an alcohol pad before you reconnect them.      What to do if problems develop:   •No urine is draining into the bag: ?Check for kinks in the tubing and straighten them out.       ?Check the tape or strap used to secure the catheter tube to your skin. Make sure it is not blocking the tube.       ?Make sure you are not sitting or lying on the tubing.      ?Make sure the urine bag is hanging below the level of your waist.      •Urine leaks from or around the catheter, tubing, or drainage bag: Check if the closed drainage system has accidently come open or apart. Clean the catheter and tubing ends with a new alcohol pad and reconnect them.       Follow up with your doctor or urologist as directed: Write down your questions so you remember to ask them during your visits. Follow up with Dr. Godfrey.  Continue your regular medications as per routine.  In the ED, your 18 Fr Goode catheter was removed & a new 128 Fr catheter was inserted: + return of 1000 ml urine.        Goode Catheter Placement and Care    WHAT YOU NEED TO KNOW:    A Goode catheter is a sterile tube that is inserted into your bladder to drain urine. It is also called an indwelling urinary catheter.     DISCHARGE INSTRUCTIONS:    Return to the emergency department if:   •Your catheter comes out.       •You suddenly have material that looks like sand in the tubing or drainage bag.      •No urine is draining into the bag and you have checked the system.      •You have pain in your hip, back, pelvis, or lower abdomen.      •You are confused or cannot think clearly.      Call your doctor or urologist if:   •You have a fever.      •You have bladder spasms for more than 1 day after the catheter is placed.      •You see blood in the tubing or drainage bag.      •You have a rash or itching where the catheter tube is secured to your skin.      •Urine leaks from or around the catheter, tubing, or drainage bag.      •The closed drainage system has accidently come open or apart.       •You see a layer of crystals inside the tubing.      •You have questions or concerns about your condition or care.      Care for your catheter and drainage bag: You can reduce your risk for infection and injury by caring for your catheter and drainage bag properly.  •Wash your hands often. Wash before and after you touch your catheter, tubing, or drainage bag. Use soap and water. Wear clean disposable gloves when you care for your catheter or disconnect the drainage bag. Wash your hands before you prepare or eat food.   Handwashing           •Clean your genital area 2 times every day. Clean your catheter area and anal opening after every bowel movement. ?For men: Use a soapy cloth to clean the tip of your penis. Start where the catheter enters. Wipe backward making sure to pull back the foreskin. Then use a cloth with clear water in the same direction to clean away the soap.       ?For women: Use a soapy cloth to clean the area that the catheter enters your body. Make sure to separate your labia and wipe toward the anus. Then use a cloth with clear water and wipe in the same direction.       •Secure the catheter tube so you do not pull or move the catheter. This helps prevent pain and bladder spasms. Healthcare providers will show you how to use medical tape or a strap to secure the catheter tube to your body.       •Keep a closed drainage system. Your catheter should always be attached to the drainage bag to form a closed system. Do not disconnect any part of the closed system unless you need to change the bag.      •Keep the drainage bag below the level of your waist. This helps stop urine from moving back up the tubing and into your bladder. Do not loop or kink the tubing. This can cause urine to back up and collect in your bladder. Do not let the drainage bag touch or lie on the floor.      •Empty the drainage bag when needed. The weight of a full drainage bag can be painful. Empty the drainage bag every 3 to 6 hours or when it is ? full.       •Clean and change the drainage bag as directed. Ask your healthcare provider how often you should change the drainage bag and what cleaning solution to use. Wear disposable gloves when you change the bag. Do not allow the end of the catheter or tubing to touch anything. Clean the ends with an alcohol pad before you reconnect them.      What to do if problems develop:   •No urine is draining into the bag: ?Check for kinks in the tubing and straighten them out.       ?Check the tape or strap used to secure the catheter tube to your skin. Make sure it is not blocking the tube.       ?Make sure you are not sitting or lying on the tubing.      ?Make sure the urine bag is hanging below the level of your waist.      •Urine leaks from or around the catheter, tubing, or drainage bag: Check if the closed drainage system has accidently come open or apart. Clean the catheter and tubing ends with a new alcohol pad and reconnect them.       Follow up with your doctor or urologist as directed: Write down your questions so you remember to ask them during your visits.

## 2022-11-28 NOTE — ED PROVIDER NOTE - GENITOURINARY, MLM
Goode catheter in place, + erosion of urethral meatus from chronic Goode use.  No flank nor CVAT. 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  No flank nor CVAT.

## 2022-11-28 NOTE — ED PROVIDER NOTE - PATIENT PORTAL LINK FT
You can access the FollowMyHealth Patient Portal offered by [facility] by registering at the following website: [email or web address]. By joining [employer]’s FollowMyHealth portal, you will also be able to view your health information using other applications (apps) compatible with our system.

## 2022-11-28 NOTE — ED PROVIDER NOTE - NS ED MD DISPO DISCHARGE CCDA
2/23 1st attempt.  LVM for patient to schedule:    - a overdue follow up visit with Dina Jones at patient convenience.    -thyroid labs ordered by Dr. Huffman    - reschedule 2/22 appointment with Dr. Huffman (patient no show for that appointment)    Please assist patient in scheduling these appointments when they call back.    Thanks    Karla Do  Pediatric Specialty /Adult Endocrinology  [facility]   Patient/Caregiver provided printed discharge information.

## 2022-11-28 NOTE — ED PROVIDER NOTE - CLINICAL SUMMARY MEDICAL DECISION MAKING FREE TEXT BOX
pt presented ambulatory in acute urine retention d/t nonfunctioning Goode catheter, ? if clogged or malpositioned.  Plan: Replace Goode catheter, reassess.  Addendum:  + Symptomatic relief s/p Goode catheter replacement done by ED RN.  + Bloody urine return w/ much mucus noted, ~ 1000 ml.

## 2022-11-28 NOTE — ED PROVIDER NOTE - OBJECTIVE STATEMENT
83 yo WM, + chronic indwelling Goode catheter for BPH w/ associated urine retention, ambulatory to ED c/o'ing no active drainage from the catheter.  "I may have pulled it out of place a bit when I got out of my truck this afternoon. I don't know"  This yaa + associated bladder feeling "full" with + suprapubic abd pain, urgency to urinate & some urine leaking out around the catheter.

## 2022-11-28 NOTE — ED PROVIDER NOTE - PROGRESS NOTE DETAILS
C MD Krystina:  + Symptomatic relief s/p Goode catheter replacement. C MD Krystina:  + Symptomatic relief s/p Goode catheter replacement.  + Bloody urine return w/ mucus noted, ~ 1000 ml. C MD Krystina:  + Symptomatic relief s/p Goode catheter replacement done by LIZ RN.  + Bloody urine return w/ much mucus noted, ~ 1000 ml.

## 2022-11-28 NOTE — ED PROVIDER NOTE - CONSTITUTIONAL, MLM
normal... Elderly overweight WM, awake, alert, + moderate acute distress d/t low abd pain/ urine retention

## 2022-12-10 NOTE — ED ADULT NURSE NOTE - OBJECTIVE STATEMENT
pt c/o dislodged urinary catheter, catheter was changed 2 weeks ago, states no urine output since this morning, catheter exchanged, pt pain relieved.

## 2022-12-10 NOTE — ED STATDOCS - NSFOLLOWUPINSTRUCTIONS_ED_ALL_ED_FT
JIM CATHETER PLACEMENT AND CARE - General Information            Jim Catheter Placement and Care    WHAT YOU NEED TO KNOW:    What is a Jim catheter? A Jim catheter is a sterile tube that is inserted into your bladder to drain urine. It is also called an indwelling urinary catheter. The tip of the catheter has a small balloon filled with solution that holds the catheter in your bladder.                   How is a Jim catheter placed? Your genital area will be cleaned to prevent infection. The catheter will be inserted into your urethra. When urine begins to flow into the tubing, the balloon is filled to keep the catheter in place. Then, the open end will be attached to a drainage bag.     How do I care for my catheter and drainage bag? You can reduce your risk for infection and injury by caring for your catheter and drainage bag properly.  •Wash your hands often. Wash before and after you touch your catheter, tubing, or drainage bag. Use soap and water. Wear clean disposable gloves when you care for your catheter or disconnect the drainage bag. Wash your hands before you prepare or eat food.   Handwashing           •Clean your genital area 2 times every day. Clean your catheter area and anal opening after every bowel movement. ?For men: Use a soapy cloth to clean the tip of your penis. Start where the catheter enters. Wipe backward making sure to pull back the foreskin. Then use a cloth with clear water in the same direction to clean away the soap.       ?For women: Use a soapy cloth to clean the area that the catheter enters your body. Make sure to separate your labia and wipe toward the anus. Then use a cloth with clear water and wipe in the same direction.       •Secure the catheter tube so you do not pull or move the catheter. This helps prevent pain and bladder spasms. Healthcare providers will show you how to use medical tape or a strap to secure the catheter tube to your body.       •Keep a closed drainage system. Your catheter should always be attached to the drainage bag to form a closed system. Do not disconnect any part of the closed system unless you need to change the bag.      •Keep the drainage bag below the level of your waist. This helps stop urine from moving back up the tubing and into your bladder. Do not loop or kink the tubing. This can cause urine to back up and collect in your bladder. Do not let the drainage bag touch or lie on the floor.      •Empty the drainage bag when needed. The weight of a full drainage bag can be painful. Empty the drainage bag every 3 to 6 hours or when it is ? full.       •Clean and change the drainage bag as directed. Ask your healthcare provider how often you should change the drainage bag and what cleaning solution to use. Wear disposable gloves when you change the bag. Do not allow the end of the catheter or tubing to touch anything. Clean the ends with an alcohol pad before you reconnect them.      What can I do if problems develop?   •No urine is draining into the bag: ?Check for kinks in the tubing and straighten them out.       ?Check the tape or strap used to secure the catheter tube to your skin. Make sure it is not blocking the tube.       ?Make sure you are not sitting or lying on the tubing.      ?Make sure the urine bag is hanging below the level of your waist.      •Urine leaks from or around the catheter, tubing, or drainage bag: Check if the closed drainage system has accidently come open or apart. Clean the catheter and tubing ends with a new alcohol pad and reconnect them.       When should I seek immediate care?   •Your catheter comes out.       •You suddenly have material that looks like sand in the tubing or drainage bag.      •No urine is draining into the bag and you have checked the system.      •You have pain in your hip, back, pelvis, or lower abdomen.      •You are confused or cannot think clearly.      When should I call my doctor?   •You have a fever.      •You have bladder spasms for more than 1 day after the catheter is placed.      •You see blood in the tubing or drainage bag.      •You have a rash or itching where the catheter tube is secured to your skin.      •Urine leaks from or around the catheter, tubing, or drainage bag.      •The closed drainage system has accidently come open or apart.       •You see a layer of crystals inside the tubing.      •You have questions or concerns about your condition or care.      CARE AGREEMENT:    You have the right to help plan your care. Learn about your health condition and how it may be treated. Discuss treatment options with your healthcare providers to decide what care you want to receive. You always have the right to refuse treatment.

## 2022-12-10 NOTE — ED STATDOCS - CLINICAL SUMMARY MEDICAL DECISION MAKING FREE TEXT BOX
Replace keira. F/u Dr. Godfrey Replace crockett. F/u Dr. Bekah ARNOLD note: Crockett replaced with good urine output. Patient re-examined and re-evaluated. Patient feels much better at this time. ED evaluation, Diagnosis and management discussed with the patient in detail. Workup results discussed with ED attending, OK to dc home. Close UROLOGY follow up encouraged, aftercare to assist with scheduling appointment ASAP. Strict ED return instructions discussed in detail and patient given the opportunity to ask any questions about their discharge diagnosis and instructions. Patient verbalized understanding. ~ Charly Putnam PA-C

## 2022-12-10 NOTE — ED STATDOCS - NS ED ROS FT
Constitutional: No fever or chills  Eyes: No visual changes  HEENT: No throat pain  CV: No chest pain  Resp: No SOB no cough  GI: No abd pain, nausea or vomiting  : No dysuria, +bladder pressure   MSK: No musculoskeletal pain  Skin: No rash  Neuro: No headache

## 2022-12-10 NOTE — ED STATDOCS - PATIENT PORTAL LINK FT
You can access the FollowMyHealth Patient Portal offered by [facility] by registering at the following website: [email or web address]. By joining [employer]’s FollowMyHealth portal, you will also be able to view your health information using other applications (apps) compatible with our system.

## 2022-12-10 NOTE — ED STATDOCS - PHYSICAL EXAMINATION
Constitutional: NAD AAOx3  Eyes: PERRL, EOMI  Head: Normocephalic atraumatic  Mouth: MMM  Cardiac: regular rate   Resp: Lungs CTAB  GI: Abd s/nt/nd  Neuro: CN2-12 intact  Extremities: Intact distal pulses b/l, no calf tenderness, normal ROM b/l UE and LE   Skin: No rashes Attending: Constitutional: NAD AAOx3  Eyes: PERRL, EOMI  Head: Normocephalic atraumatic  Mouth: MMM  Cardiac: regular rate   Resp: Lungs CTAB  GI: Abd s/nt/nd  Neuro: CN2-12 intact  Extremities: Intact distal pulses b/l, no calf tenderness, normal ROM b/l UE and LE   Skin: No rashes

## 2022-12-10 NOTE — ED STATDOCS - OBJECTIVE STATEMENT
81 yo male  presents to the ED for catheter complaint. Pt states that 2 weeks ago the catheter slipped out and was replaced but states that it was not put in correctly but was able to drain. Today, pt states that today he feels pressure in his bladder. Pt denies any fevers, abd pain. Pt states that he thinks that catheter is not working correctly which may be the cause of the pressure.   Urologist: Dr. Godfrey

## 2022-12-10 NOTE — ED STATDOCS - PROGRESS NOTE DETAILS
PA: Patient is an 83 yo male with PMHx of chronic urinary retention, AFib, HTN, HLD, hip osteoarthritis, who presents to ED c/o pressure in his bladder. Pt denies any fever, abd pain. Pt he thinks that catheter is not working correctly. ~Charly Putnam PA-C PA note: Goode replaced with good urine output. Patient re-examined and re-evaluated. Patient feels much better at this time. ED evaluation, Diagnosis and management discussed with the patient in detail. Workup results discussed with ED attending, OK to MN home. Close UROLOGY follow up encouraged, aftercare to assist with scheduling appointment ASAP. Strict ED return instructions discussed in detail and patient given the opportunity to ask any questions about their discharge diagnosis and instructions. Patient verbalized understanding. ~ Charly Putnam PA-C

## 2022-12-17 NOTE — ED STATDOCS - PROGRESS NOTE DETAILS
83 y/o Male with HTN, HLD, BPH presents to ed for difficulty urinating s/p catheter was dislodged this morning.  Goode placed in ED.  U/A with evidence of infection.  (+) Nitrite, Blood, Leuk esterase).  Keflex will be given in the ED.  Will start PO Cefpodoxime BID.  Will dc home.

## 2022-12-17 NOTE — ED ADULT NURSE NOTE - OBJECTIVE STATEMENT
arrives to ED with chronic indwelling urinary catheter. pt states it has not been draining urine since this morning. c/o pressure sensation to lower abdomen. reports this has happened several times and he required new catheter placement. on anticoagulant apixbana.

## 2022-12-17 NOTE — ED STATDOCS - CLINICAL SUMMARY MEDICAL DECISION MAKING FREE TEXT BOX
Patient with HTN, asymptomatic, likely 2/2 chronic HTN in setting of discomfort.  Plan for crockett, UA and urine culture.  Follow up with Dr. Godfrey.

## 2022-12-17 NOTE — ED STATDOCS - CARE PROVIDER_API CALL
Marcus Godfrey)  Urology  [address], [address] [place] [address]  Phone: [phone]  Fax: [phone]  Established Patient  Follow Up Time:

## 2022-12-17 NOTE — ED STATDOCS - PATIENT PORTAL LINK FT
You can access the FollowMyHealth Patient Portal offered by [facility] by registering at the following website: [email or web address]. By joining [employer]’s FollowMyHealth portal, you will also be able to view your health information using other applications (apps) compatible with our system.

## 2022-12-17 NOTE — ED ADULT NURSE NOTE - NSIMPLEMENTINTERV_GEN_ALL_ED
Here with odilon Diaz and complains of fever, vomiting and diarrhea since non yesterday. Implemented All Fall with Harm Risk Interventions:  [place] to call system. Call bell, personal items and telephone within reach. Instruct patient to call for assistance. Room bathroom lighting operational. Non-slip footwear when patient is off stretcher. Physically safe environment: no spills, clutter or unnecessary equipment. Stretcher in lowest position, wheels locked, appropriate side rails in place. Provide visual cue, wrist band, yellow gown, etc. Monitor gait and stability. Monitor for mental status changes and reorient to person, place, and time. Review medications for side effects contributing to fall risk. Reinforce activity limits and safety measures with patient and family. Provide visual clues: red socks.

## 2022-12-17 NOTE — ED ADULT NURSE NOTE - CHIEF COMPLAINT QUOTE
Chronic Goode catheter in place x 2 weeks. Patient states it has not been draining urine since this morning. Complaining of pressure sensation to lower abdomen. States this has happened several times and he has needed the catheter changed. Denies an blood tinged urine or clots.

## 2022-12-17 NOTE — ED ADULT TRIAGE NOTE - CHIEF COMPLAINT QUOTE
Chronic Goode catheter in place x 2 weeks. Patient states it has not been draining urine since this morning. Complaining of pressure sensation to lower abdomen. States this has happened several times and he has needed the catheter changed. Chronic Goode catheter in place x 2 weeks. Patient states it has not been draining urine since this morning. Complaining of pressure sensation to lower abdomen. States this has happened several times and he has needed the catheter changed. Denies an blood tinged urine or clots.

## 2022-12-17 NOTE — ED STATDOCS - CARE PLAN
Principal Discharge DX:	Urinary retention   1 Principal Discharge DX:	Urinary retention  Secondary Diagnosis:	Acute UTI

## 2022-12-17 NOTE — ED STATDOCS - OBJECTIVE STATEMENT
81 yo M hx of HTN, HLD, BPH, presents with CC of difficulty urinating.  Patient states he had a urinary catheter that dislodged this morning.  Unable to void since then.  C/o discomfort in suprapubic area and penis.  Denies fever, chills, hematuria, nausea, vomiting, or any other symptoms.  Urologist is Dr. Godfrey.

## 2022-12-17 NOTE — ED STATDOCS - MUSCULOSKELETAL, MLM
Anesthesia Evaluation     Patient summary reviewed and Nursing notes reviewed                Airway   Mallampati: II  Dental      Pulmonary    (+) a smoker Former,   Cardiovascular     ECG reviewed  Rhythm: irregular  Rate: normal    (+) hypertension, valvular problems/murmurs murmur, dysrhythmias PVC, Paroxysmal Atrial Fib, hyperlipidemia,       Neuro/Psych- negative ROS  GI/Hepatic/Renal/Endo - negative ROS     Musculoskeletal (-) negative ROS    Abdominal    Substance History - negative use     OB/GYN negative ob/gyn ROS         Other      history of cancer                    Anesthesia Plan    ASA 3     general   (TAP blocks after induction)  intravenous induction   Anesthetic plan, all risks, benefits, and alternatives have been provided, discussed and informed consent has been obtained with: patient.       range of motion is not limited and there is no muscle tenderness.

## 2022-12-29 NOTE — ED STATDOCS - HIV OFFER
Previously Declined (within the last year) Skin normal color for race, warm, dry and intact. No evidence of rash.

## 2022-12-29 NOTE — ED ADULT TRIAGE NOTE - CHIEF COMPLAINT QUOTE
patient ambulatory to ED c/o catheter issue.  patient reports urinary catheter got pulled out a bit this morning, urine is leaking around catheter.

## 2022-12-29 NOTE — ED STATDOCS - CLINICAL SUMMARY MEDICAL DECISION MAKING FREE TEXT BOX
Patient crockett replaced.  Has lower extremity venous stasis with celluiltis.  Admit to medicine for continued IV antibiotics.

## 2022-12-29 NOTE — ED STATDOCS - CARE PLAN
1 Principal Discharge DX:	Bilateral lower leg cellulitis  Secondary Diagnosis:	Enlarged prostate  Secondary Diagnosis:	Malfunction of Goode catheter, initial encounter  Secondary Diagnosis:	Venous insufficiency of leg

## 2022-12-29 NOTE — ED STATDOCS - PROGRESS NOTE DETAILS
81 y/o Male with A.fib, BPH, HTN, HLD, OA, varicose veins presents to ED c/o urinary catheter coming out today when he extended his leg getting out of the car today.  Pt was initially brought to  for catheter replacement.  Nurse noticed that legs were red and swollen, so I went to re-eval pt.  Both LE with with dry, excoriated skin.  Right lower leg edematous with tenderness to r calf muscle.  well demarcated area of skin to right lower leg with oozing, surrounding erythema.  1-2+ pedal pulses bilat.  Likely admit for Cellulitis.  Layne Gross PA-C

## 2022-12-29 NOTE — PATIENT PROFILE ADULT - FALL HARM RISK - RISK INTERVENTIONS
Assistance OOB with selected safe patient handling equipment/Assistance with ambulation/Communicate Fall Risk and Risk Factors to all staff, patient, and family/Discuss with provider need for PT consult/Monitor gait and stability/Reinforce activity limits and safety measures with patient and family/Visual Cue: Yellow wristband/Bed in lowest position, wheels locked, appropriate side rails in place/Call bell, personal items and telephone in reach/Instruct patient to call for assistance before getting out of bed or chair/Non-slip footwear when patient is out of bed/[place] to call system/Physically safe environment - no spills, clutter or unnecessary equipment/Purposeful Proactive Rounding/Room/bathroom lighting operational, light cord in reach

## 2022-12-29 NOTE — ED STATDOCS - OBJECTIVE STATEMENT
81 y/o male with a PMHx of Afib, BPH, hearing loss, HTN, hypercholesterolemia, legally blind, OA, obesity, varicose veins presents to the ED fpr catheter issue. Pt states since he was d/c he has been doing well however when he extended his leg and this catheter pulled. Pt feels like it is leaking. No other complaints at this time.

## 2022-12-29 NOTE — PATIENT PROFILE ADULT - FUNCTIONAL ASSESSMENT - BASIC MOBILITY 6.
2-calculated by average/Not able to assess (calculate score using [facility] averaging method)

## 2022-12-29 NOTE — ED ADULT NURSE NOTE - NSIMPLEMENTINTERV_GEN_ALL_ED
Implemented All Fall with Harm Risk Interventions:  [place] to call system. Call bell, personal items and telephone within reach. Instruct patient to call for assistance. Room bathroom lighting operational. Non-slip footwear when patient is off stretcher. Physically safe environment: no spills, clutter or unnecessary equipment. Stretcher in lowest position, wheels locked, appropriate side rails in place. Provide visual cue, wrist band, yellow gown, etc. Monitor gait and stability. Monitor for mental status changes and reorient to person, place, and time. Review medications for side effects contributing to fall risk. Reinforce activity limits and safety measures with patient and family. Provide visual clues: red socks.

## 2022-12-29 NOTE — ED ADULT NURSE NOTE - OBJECTIVE STATEMENT
Patient c/o urine leaking around catheter since this morning when he extended his leg and felt it pull out a little bit. no urine in leg bag. Gooed replaced with good drainage. While taking off patient's pants noticed patient has extensive ulceration on right lower leg with edema. c/o pain ro right lower leg. Has been like that for a while he states. Denies CP, SOB

## 2022-12-29 NOTE — PHARMACOTHERAPY INTERVENTION NOTE - COMMENTS
Medication history complete. Medications and allergies reviewed with patient and confirmed with . 
Vancomycin dose adjusted according to weight based first dose policy

## 2022-12-30 NOTE — CONSULT NOTE ADULT - SUBJECTIVE AND OBJECTIVE BOX
Patient is a 82y old  Male who presents with a chief complaint of Crockett catheter malfunction, Right lower leg erythema (30 Dec 2022 01:07)    HPI:  81 y/o M PMHx significant for Hypertension, Hyperlipidemia, OA, BPH, A-fib (on Eliquis), urinary retention admitted on  for evaluation of misplaced crockett; the patient was admitted for replacement and noted to have right lower extremity redness, and swelling, with skin desquamation; the patient is poor historian and history per medical record.       PMH: as above  PSH: as above  Meds: per reconciliation sheet, noted below  MEDICATIONS  (STANDING):  cefepime   IVPB      cefepime   IVPB 2000 milliGRAM(s) IV Intermittent every 12 hours    MEDICATIONS  (PRN):  acetaminophen     Tablet .. 650 milliGRAM(s) Oral every 6 hours PRN Temp greater or equal to 38C (100.4F), Mild Pain (1 - 3)  aluminum hydroxide/magnesium hydroxide/simethicone Suspension 30 milliLiter(s) Oral every 4 hours PRN Dyspepsia  melatonin 3 milliGRAM(s) Oral at bedtime PRN Insomnia  ondansetron Injectable 4 milliGRAM(s) IV Push every 8 hours PRN Nausea and/or Vomiting    Allergies    No Known Allergies    Intolerances      Social: no smoking, no alcohol, no illegal drugs; no recent travel, no exposure to TB  FAMILY HISTORY:  Family history of melanoma  father       ROS unable to obtain secondary to patient medical condition     Vital Signs Last 24 Hrs  T(C): 36.4 (30 Dec 2022 08:09), Max: 37.1 (29 Dec 2022 18:25)  T(F): 97.6 (30 Dec 2022 08:09), Max: 98.7 (29 Dec 2022 18:25)  HR: 102 (30 Dec 2022 08:09) (73 - 102)  BP: 155/97 (30 Dec 2022 08:09) (141/82 - 156/93)  BP(mean): --  RR: 18 (30 Dec 2022 08:09) (18 - 18)  SpO2: 97% (30 Dec 2022 08:09) (97% - 98%)    Parameters below as of 30 Dec 2022 08:09  Patient On (Oxygen Delivery Method): room air      Daily     Daily     PE:    Constitutional: frail looking  HEENT: NC/AT, EOMI, PERRLA, conjunctivae clear; ears and nose atraumatic; pharynx clear  Neck: supple; thyroid not palpable  Back: no tenderness  Respiratory: respiratory effort normal; clear to auscultation  Cardiovascular: S1S2 regular, no murmurs  Abdomen: soft, not tender, not distended, positive BS; no liver or spleen organomegaly  Genitourinary: no suprapubic tenderness  Musculoskeletal: no muscle tenderness, right lower extremity with erythema, edema; skin cracking from knee to toes  Neurological/ Psychiatric:   moving all extremities  Skin: no rashes; no palpable lesions    Labs: all available labs reviewed                        14.1   4.96  )-----------( 208      ( 30 Dec 2022 08:48 )             41.8     12-30    134<L>  |  104  |  15  ----------------------------<  98  4.2   |  25  |  0.66    Ca    8.9      30 Dec 2022 08:48    TPro  6.7  /  Alb  2.5<L>  /  TBili  0.9  /  DBili  x   /  AST  18  /  ALT  13  /  AlkPhos  90  12-30     LIVER FUNCTIONS - ( 30 Dec 2022 08:48 )  Alb: 2.5 g/dL / Pro: 6.7 gm/dL / ALK PHOS: 90 U/L / ALT: 13 U/L / AST: 18 U/L / GGT: x           Urinalysis Basic - ( 29 Dec 2022 15:23 )    Color: Yellow / Appearance: Clear / S.020 / pH: x  Gluc: x / Ketone: Negative  / Bili: Negative / Urobili: Negative   Blood: x / Protein: 30 mg/dL / Nitrite: Negative   Leuk Esterase: Small / RBC: 25-50 /HPF / WBC 6-10 /HPF   Sq Epi: x / Non Sq Epi: Occasional / Bacteria: Few          Radiology: all available radiological tests reviewed    Advanced directives addressed: full resuscitation

## 2022-12-30 NOTE — ADVANCED PRACTICE NURSE CONSULT - ASSESSMENT
This is a 83 y/o Male admitted on 12/29/2022 for evaluation and management of Goode catheter malfunction. PMHx significant for Hypertension, Hyperlipidemia, OA, BPH, A-fib (on Eliquis), urinary retention.     Assessed patient on TCU and laying on a centrela low air loss mattress.   Patient Alert and following instruction well.  Patient was very focused the whole assessment on his indwelling catheter.   GANESH 16    Lower extremities Bilateral with Hemosiderin staining and +3 edema to the right lower extremity and +1 to left lower extremity.     Left Lower extremity with dry skin and no wounds noted. Apply Sween 24 lotion daily.     Right Lower extremity: Anterior portion with severe dry scaling skin and dried serous drainage. No open wounds noted on the Anterior.   Posterior Right Lower extremity distal calf to achilles 4.5cm x 3.4cm x 0.1cm partial thickness wound with serosanguinous drainage noted, with pink wound bed. Cleansed bilateral lower extremity with luke warn water and 1/2 a cap of Chlorhexidine in peribottle and irrigated. Recommend this daily. Washed off with Saline and PAT dry. Applied Sween 24 to dry skin. To posterior wound applied Triad cream, adaptic and cover with Abdominal pad, wrapped with Light Ace Wrap. Daily change and prn if soiled.   Bilateral upper gluteal and upper intergluteal cleft with Hyperpigmented skin and to the right upper gluteal with 0.4cm x 0.4cm x 0.1cm and Left Upper gluteal 0.3cm x 0.4cm x 0.1cm partial thickness wound. This can be classified as a stage 2 pressure injury with chronic friction and moisture as evidence by the hyperpigmentation of the skin. Recommend Triad Cream and utilize 1 absorbant pad under patient. Continue to turn and position every two hours.   All there wound and skin abnormalities were present on admission.

## 2022-12-30 NOTE — H&P ADULT - HISTORY OF PRESENT ILLNESS
81 y/o M PMHx significant for Hypertension, Hyperlipidemia, OA, BPH, A-fib (on Eliquis), urinary retention presents to the [facility] for further evaluation and management of Goode catheter malfunction. Of note the patient's Goode catheter was accidentally pulled this morning causing urine to leak around the catheter. In the ED the patient was found to have right > left lower extremity swelling/edema, and erythema associated w/ (+) tenderness.

## 2022-12-30 NOTE — ADVANCED PRACTICE NURSE CONSULT - RECOMMEDATIONS
-Cavilon  (liquid skin barrier) to coccyx and gluteal  - daily.    -Bilateral Upper Inter Gluteal and Gluteal Cleft: Cleanse with NS, pat dry, apply TRIAD (Zinc-oxide barrier paste) BID and PRN for soiling: It is alright to leave a thin layer of cream on the skin after cleansing as this will not impede wound healing-  -Bilateral Lower extremities: Irrigate with Chlorhexidine (use peribottle with Luke warm water and 1/2Cap of chlorhexidine Clean off with Saline and PAT dry.    -Right Posterior Lower extremity: Apply Triad Cream to wound bed cover with Adaptic and Abdominal Pad, lightly wrap with Kerlix. Wrap with Ace wrap from toe to knee. Daily change. remove ace wraps at night.   ·Recommendation: ·Turn and Reposition Q2H  ·Offload sacral-coccygeal area with positioner pillow, alternate sides Q2H,   ·Incontinence care with repositioning Q2H  -Continue turning/positioning patient from side-to-side q2h while in bed, q1h when/if OOB chair, or in accordance w/ pt's plan of care. Utilize pillows and/or z-francisca fluidized positioner to assist w/ turning/positioning. When/if OOB chair, utilize pillows or air waffle chair cushion to offload pressure. If patietn a fall risk ensure chair alarm is on and working. Apply Antiskid mat under chair alarm to prevent patient from sliding off chair.   -Continue to offload heels from bed surface with soft pillow under calfs or by applying offloading boots to BLEs. May use foam boots or Total CAIR boot (while in bed please remove the plastic support/when out of bed in chair place the plastic suppot back in)   -Continue utilizing one underpad underneath patient to contain incontinence episodes; change pad when saturated/soiled. For profuse incontinence use 1 Purple pad under patient accept and wick away moisture.   -Continue nutrition consult for optimal wound healing & nutritional status.   -Assess skin/wound qshift, report changes to primary provider.   -Maintain Centrela Low Air Loss Mattress while in hospital

## 2022-12-30 NOTE — H&P ADULT - ASSESSMENT
83 y/o M PMHx significant for Hypertension, Hyperlipidemia, OA, BPH, A-fib (on Eliquis), urinary retention presents to the [facility] for further evaluation and management of Goode catheter malfunction. Of note the patient's Goode catheter was accidentally pulled this morning causing urine to leak around the catheter. In the ED the patient was found to have right > left lower extremity swelling/edema, and erythema associated w/ (+) tenderness. 81 y/o M PMHx significant for Hypertension, Hyperlipidemia, OA, BPH, A-fib (on Eliquis), urinary retention presents to the [facility] for further evaluation and management of Goode catheter malfunction. Of note the patient's Goode catheter was accidentally pulled this morning causing urine to leak around the catheter. In the ED the patient was found to have right > left lower extremity swelling/edema, and erythema associated w/ (+) tenderness.    #Acute Lower Extremity Cellulitis  ~admit to Medicine  ~f/u PAN C+S  ~cont. IV abx   ~cont. pain management  ~Lower Extremity US Duplex (-) for DVT    #Goode catheter malfunction  ~ED replaced Goode  ~monitor I/Os    #Hypertension/Atrial Fibrillation  ~cont, Metoprolol ER 100mg po daily  ~cont. Eliquis 5mg po daily    #Vte ppx  ~cont. SCDs for now

## 2022-12-30 NOTE — H&P ADULT - NSHPPHYSICALEXAM_GEN_ALL_CORE
Vital Signs Last 24 Hrs  T(C): 36.8 (29 Dec 2022 23:38), Max: 37.1 (29 Dec 2022 18:25)  T(F): 98.3 (29 Dec 2022 23:38), Max: 98.7 (29 Dec 2022 18:25)  HR: 87 (29 Dec 2022 23:38) (73 - 106)  BP: 141/82 (29 Dec 2022 23:38) (135/93 - 156/93)  BP(mean): 105 (29 Dec 2022 12:32) (105 - 105)  RR: 18 (29 Dec 2022 18:25) (18 - 18)  SpO2: 97% (29 Dec 2022 23:38) (95% - 98%)    Parameters below as of 29 Dec 2022 23:38  Patient On (Oxygen Delivery Method): room air

## 2022-12-30 NOTE — CONSULT NOTE ADULT - ASSESSMENT
83 y/o M PMHx significant for Hypertension, Hyperlipidemia, OA, BPH, A-fib (on Eliquis), urinary retention admitted on 12/29 for evaluation of misplaced crockett; the patient was admitted for replacement and noted to have right lower extremity redness, and swelling, with skin desquamation; the patient is poor historian and history per medical record.     1. Patient admitted with right lower extremity cellulitis; had crockett catheter replaced  - follow up cultures   - serial cbc and monitor temperature   - reviewed prior medical records to evaluate for resistant or atypical pathogens   - iv hydration and supportive care   - elevate legs  - will continue cefepime as ordered  - hold on further vancomycin to prevent nephrotoxicity in this elderly male  - will start doxycycline to cover resistant pathogens  2. other issues; per medicine

## 2023-01-01 ENCOUNTER — APPOINTMENT (OUTPATIENT)
Dept: UROLOGY | Facility: CLINIC | Age: 83
End: 2023-01-01
Payer: MEDICARE

## 2023-01-01 ENCOUNTER — APPOINTMENT (OUTPATIENT)
Dept: UROLOGY | Facility: CLINIC | Age: 83
End: 2023-01-01

## 2023-01-01 ENCOUNTER — NON-APPOINTMENT (OUTPATIENT)
Age: 83
End: 2023-01-01

## 2023-01-01 ENCOUNTER — EMERGENCY (EMERGENCY)
Facility: HOSPITAL | Age: 83
LOS: 0 days | Discharge: HOME | End: 2023-02-04
Attending: EMERGENCY MEDICINE
Payer: MEDICARE

## 2023-01-01 ENCOUNTER — EMERGENCY (EMERGENCY)
Facility: HOSPITAL | Age: 83
LOS: 0 days | Discharge: HOME | End: 2023-09-21
Attending: STUDENT IN AN ORGANIZED HEALTH CARE EDUCATION/TRAINING PROGRAM
Payer: MEDICARE

## 2023-01-01 ENCOUNTER — APPOINTMENT (OUTPATIENT)
Dept: ULTRASOUND IMAGING | Facility: CLINIC | Age: 83
End: 2023-01-01

## 2023-01-01 ENCOUNTER — APPOINTMENT (OUTPATIENT)
Dept: CT IMAGING | Facility: CLINIC | Age: 83
End: 2023-01-01
Payer: MEDICARE

## 2023-01-01 ENCOUNTER — TRANSCRIPTION ENCOUNTER (OUTPATIENT)
Age: 83
End: 2023-01-01

## 2023-01-01 ENCOUNTER — INPATIENT (INPATIENT)
Facility: HOSPITAL | Age: 83
LOS: 1 days | DRG: 208 | End: 2023-10-10
Attending: SURGERY | Admitting: STUDENT IN AN ORGANIZED HEALTH CARE EDUCATION/TRAINING PROGRAM
Payer: MEDICARE

## 2023-01-01 ENCOUNTER — APPOINTMENT (OUTPATIENT)
Dept: ULTRASOUND IMAGING | Facility: CLINIC | Age: 83
End: 2023-01-01
Payer: MEDICARE

## 2023-01-01 ENCOUNTER — INPATIENT (INPATIENT)
Facility: HOSPITAL | Age: 83
LOS: 4 days | Discharge: HOME HEALTH | DRG: 699 | End: 2023-06-01
Attending: FAMILY MEDICINE | Admitting: INTERNAL MEDICINE
Payer: MEDICARE

## 2023-01-01 ENCOUNTER — EMERGENCY (EMERGENCY)
Facility: HOSPITAL | Age: 83
LOS: 0 days | Discharge: HOME | End: 2023-02-20
Attending: STUDENT IN AN ORGANIZED HEALTH CARE EDUCATION/TRAINING PROGRAM
Payer: MEDICARE

## 2023-01-01 ENCOUNTER — OUTPATIENT (OUTPATIENT)
Dept: OUTPATIENT SERVICES | Facility: HOSPITAL | Age: 83
LOS: 1 days | End: 2023-01-01
Payer: MEDICARE

## 2023-01-01 ENCOUNTER — EMERGENCY (EMERGENCY)
Facility: HOSPITAL | Age: 83
LOS: 0 days | Discharge: HOME | End: 2023-02-18
Attending: EMERGENCY MEDICINE
Payer: MEDICARE

## 2023-01-01 ENCOUNTER — RX RENEWAL (OUTPATIENT)
Age: 83
End: 2023-01-01

## 2023-01-01 ENCOUNTER — EMERGENCY (EMERGENCY)
Facility: HOSPITAL | Age: 83
LOS: 0 days | Discharge: HOME | End: 2023-03-06
Attending: EMERGENCY MEDICINE
Payer: MEDICARE

## 2023-01-01 VITALS — WEIGHT: 235.01 LBS | HEIGHT: 72 IN

## 2023-01-01 VITALS — WEIGHT: 225.97 LBS

## 2023-01-01 VITALS
HEART RATE: 74 BPM | OXYGEN SATURATION: 98 % | DIASTOLIC BLOOD PRESSURE: 84 MMHG | RESPIRATION RATE: 16 BRPM | SYSTOLIC BLOOD PRESSURE: 137 MMHG

## 2023-01-01 VITALS
TEMPERATURE: 98 F | RESPIRATION RATE: 16 BRPM | HEART RATE: 69 BPM | OXYGEN SATURATION: 100 % | DIASTOLIC BLOOD PRESSURE: 82 MMHG | SYSTOLIC BLOOD PRESSURE: 125 MMHG

## 2023-01-01 VITALS
WEIGHT: 179.9 LBS | HEART RATE: 62 BPM | RESPIRATION RATE: 20 BRPM | HEIGHT: 70 IN | DIASTOLIC BLOOD PRESSURE: 99 MMHG | SYSTOLIC BLOOD PRESSURE: 113 MMHG | TEMPERATURE: 99 F | OXYGEN SATURATION: 80 %

## 2023-01-01 VITALS
HEART RATE: 129 BPM | TEMPERATURE: 98 F | WEIGHT: 229.94 LBS | SYSTOLIC BLOOD PRESSURE: 135 MMHG | RESPIRATION RATE: 18 BRPM | OXYGEN SATURATION: 99 % | DIASTOLIC BLOOD PRESSURE: 77 MMHG

## 2023-01-01 VITALS
BODY MASS INDEX: 35.55 KG/M2 | HEART RATE: 85 BPM | HEIGHT: 69 IN | DIASTOLIC BLOOD PRESSURE: 87 MMHG | SYSTOLIC BLOOD PRESSURE: 162 MMHG | WEIGHT: 240 LBS

## 2023-01-01 VITALS
HEART RATE: 74 BPM | TEMPERATURE: 98 F | OXYGEN SATURATION: 98 % | SYSTOLIC BLOOD PRESSURE: 109 MMHG | DIASTOLIC BLOOD PRESSURE: 58 MMHG | RESPIRATION RATE: 18 BRPM

## 2023-01-01 VITALS
RESPIRATION RATE: 16 BRPM | DIASTOLIC BLOOD PRESSURE: 91 MMHG | OXYGEN SATURATION: 99 % | SYSTOLIC BLOOD PRESSURE: 137 MMHG | HEART RATE: 83 BPM | TEMPERATURE: 98 F

## 2023-01-01 VITALS
HEART RATE: 82 BPM | OXYGEN SATURATION: 100 % | DIASTOLIC BLOOD PRESSURE: 82 MMHG | RESPIRATION RATE: 17 BRPM | SYSTOLIC BLOOD PRESSURE: 148 MMHG

## 2023-01-01 VITALS — DIASTOLIC BLOOD PRESSURE: 87 MMHG | RESPIRATION RATE: 18 BRPM | TEMPERATURE: 98 F | SYSTOLIC BLOOD PRESSURE: 137 MMHG

## 2023-01-01 VITALS
RESPIRATION RATE: 22 BRPM | HEIGHT: 70 IN | TEMPERATURE: 98 F | OXYGEN SATURATION: 96 % | DIASTOLIC BLOOD PRESSURE: 166 MMHG | WEIGHT: 225.09 LBS | SYSTOLIC BLOOD PRESSURE: 195 MMHG | HEART RATE: 122 BPM

## 2023-01-01 VITALS
TEMPERATURE: 98 F | DIASTOLIC BLOOD PRESSURE: 94 MMHG | SYSTOLIC BLOOD PRESSURE: 154 MMHG | OXYGEN SATURATION: 95 % | RESPIRATION RATE: 18 BRPM | HEART RATE: 75 BPM

## 2023-01-01 VITALS
OXYGEN SATURATION: 98 % | TEMPERATURE: 98 F | DIASTOLIC BLOOD PRESSURE: 70 MMHG | HEART RATE: 71 BPM | RESPIRATION RATE: 18 BRPM | SYSTOLIC BLOOD PRESSURE: 125 MMHG

## 2023-01-01 VITALS
SYSTOLIC BLOOD PRESSURE: 124 MMHG | OXYGEN SATURATION: 96 % | DIASTOLIC BLOOD PRESSURE: 76 MMHG | TEMPERATURE: 97.2 F | HEIGHT: 69 IN | BODY MASS INDEX: 34.36 KG/M2 | HEART RATE: 73 BPM | WEIGHT: 232 LBS

## 2023-01-01 VITALS
TEMPERATURE: 96 F | DIASTOLIC BLOOD PRESSURE: 85 MMHG | HEART RATE: 87 BPM | RESPIRATION RATE: 18 BRPM | SYSTOLIC BLOOD PRESSURE: 146 MMHG | OXYGEN SATURATION: 95 %

## 2023-01-01 VITALS — HEIGHT: 72 IN | WEIGHT: 220.02 LBS

## 2023-01-01 VITALS — WEIGHT: 240.08 LBS | HEIGHT: 72 IN

## 2023-01-01 DIAGNOSIS — I48.91 UNSPECIFIED ATRIAL FIBRILLATION: ICD-10-CM
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
## 2023-01-01 DIAGNOSIS — L03.115 CELLULITIS OF RIGHT LOWER LIMB: ICD-10-CM

## 2023-01-01 DIAGNOSIS — R31.0 GROSS HEMATURIA: ICD-10-CM

## 2023-01-01 DIAGNOSIS — H91.90 UNSPECIFIED HEARING LOSS, UNSPECIFIED EAR: ICD-10-CM

## 2023-01-01 DIAGNOSIS — T83.511A INFECTION AND INFLAMMATORY REACTION DUE TO INDWELLING URETHRAL CATHETER, INITIAL ENCOUNTER: ICD-10-CM

## 2023-01-01 DIAGNOSIS — N40.1 BENIGN PROSTATIC HYPERPLASIA WITH LOWER URINARY TRACT SYMPTOMS: ICD-10-CM

## 2023-01-01 DIAGNOSIS — M16.9 OSTEOARTHRITIS OF HIP, UNSPECIFIED: ICD-10-CM

## 2023-01-01 DIAGNOSIS — Z98.890 OTHER SPECIFIED POSTPROCEDURAL STATES: Chronic | ICD-10-CM

## 2023-01-01 DIAGNOSIS — I10 ESSENTIAL (PRIMARY) HYPERTENSION: ICD-10-CM

## 2023-01-01 DIAGNOSIS — I48.20 CHRONIC ATRIAL FIBRILLATION, UNSPECIFIED: ICD-10-CM
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
## 2023-01-01 DIAGNOSIS — E78.00 PURE HYPERCHOLESTEROLEMIA, UNSPECIFIED: ICD-10-CM

## 2023-01-01 DIAGNOSIS — E87.1 HYPO-OSMOLALITY AND HYPONATREMIA: ICD-10-CM

## 2023-01-01 DIAGNOSIS — H54.8 LEGAL BLINDNESS, AS DEFINED IN USA: ICD-10-CM

## 2023-01-01 DIAGNOSIS — T83.038A LEAKAGE OF OTHER URINARY CATHETER, INITIAL ENCOUNTER: ICD-10-CM
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
## 2023-01-01 DIAGNOSIS — R39.15 URGENCY OF URINATION: ICD-10-CM

## 2023-01-01 DIAGNOSIS — Z86.718 PERSONAL HISTORY OF OTHER VENOUS THROMBOSIS AND EMBOLISM: ICD-10-CM

## 2023-01-01 DIAGNOSIS — R82.998 OTHER ABNORMAL FINDINGS IN URINE: ICD-10-CM

## 2023-01-01 DIAGNOSIS — L03.90 CELLULITIS, UNSPECIFIED: ICD-10-CM

## 2023-01-01 DIAGNOSIS — I87.2 VENOUS INSUFFICIENCY (CHRONIC) (PERIPHERAL): ICD-10-CM

## 2023-01-01 DIAGNOSIS — R33.8 OTHER RETENTION OF URINE: ICD-10-CM

## 2023-01-01 DIAGNOSIS — R10.9 UNSPECIFIED ABDOMINAL PAIN: ICD-10-CM

## 2023-01-01 DIAGNOSIS — R33.9 RETENTION OF URINE, UNSPECIFIED: ICD-10-CM
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
## 2023-01-01 DIAGNOSIS — L03.116 CELLULITIS OF LEFT LOWER LIMB: ICD-10-CM

## 2023-01-01 DIAGNOSIS — E66.9 OBESITY, UNSPECIFIED: ICD-10-CM

## 2023-01-01 DIAGNOSIS — Z80.8 FAMILY HISTORY OF MALIGNANT NEOPLASM OF OTHER ORGANS OR SYSTEMS: ICD-10-CM

## 2023-01-01 DIAGNOSIS — E78.5 HYPERLIPIDEMIA, UNSPECIFIED: ICD-10-CM

## 2023-01-01 DIAGNOSIS — I27.20 PULMONARY HYPERTENSION, UNSPECIFIED: ICD-10-CM

## 2023-01-01 DIAGNOSIS — E55.9 VITAMIN D DEFICIENCY, UNSPECIFIED: ICD-10-CM

## 2023-01-01 DIAGNOSIS — Z79.01 LONG TERM (CURRENT) USE OF ANTICOAGULANTS: ICD-10-CM

## 2023-01-01 DIAGNOSIS — M79.89 OTHER SPECIFIED SOFT TISSUE DISORDERS: ICD-10-CM

## 2023-01-01 DIAGNOSIS — I25.10 ATHEROSCLEROTIC HEART DISEASE OF NATIVE CORONARY ARTERY WITHOUT ANGINA PECTORIS: ICD-10-CM

## 2023-01-01 DIAGNOSIS — M71.22 SYNOVIAL CYST OF POPLITEAL SPACE [BAKER], LEFT KNEE: ICD-10-CM

## 2023-01-01 DIAGNOSIS — N40.0 BENIGN PROSTATIC HYPERPLASIA WITHOUT LOWER URINARY TRACT SYMPTOMS: ICD-10-CM

## 2023-01-01 DIAGNOSIS — Z86.39 PERSONAL HISTORY OF OTHER ENDOCRINE, NUTRITIONAL AND METABOLIC DISEASE: ICD-10-CM

## 2023-01-01 DIAGNOSIS — T83.011A BREAKDOWN (MECHANICAL) OF INDWELLING URETHRAL CATHETER, INITIAL ENCOUNTER: ICD-10-CM

## 2023-01-01 DIAGNOSIS — M19.90 UNSPECIFIED OSTEOARTHRITIS, UNSPECIFIED SITE: ICD-10-CM

## 2023-01-01 DIAGNOSIS — N39.0 URINARY TRACT INFECTION, SITE NOT SPECIFIED: ICD-10-CM

## 2023-01-01 DIAGNOSIS — M16.10 UNILATERAL PRIMARY OSTEOARTHRITIS, UNSPECIFIED HIP: ICD-10-CM

## 2023-01-01 DIAGNOSIS — B96.89 OTHER SPECIFIED BACTERIAL AGENTS AS THE CAUSE OF DISEASES CLASSIFIED ELSEWHERE: ICD-10-CM

## 2023-01-01 DIAGNOSIS — J18.9 PNEUMONIA, UNSPECIFIED ORGANISM: ICD-10-CM

## 2023-01-01 DIAGNOSIS — D72.829 ELEVATED WHITE BLOOD CELL COUNT, UNSPECIFIED: ICD-10-CM

## 2023-01-01 DIAGNOSIS — Z91.148 PATIENT'S OTHER NONCOMPLIANCE WITH MEDICATION REGIMEN FOR OTHER REASON: ICD-10-CM

## 2023-01-01 DIAGNOSIS — M16.11 UNILATERAL PRIMARY OSTEOARTHRITIS, RIGHT HIP: ICD-10-CM

## 2023-01-01 DIAGNOSIS — N32.89 OTHER SPECIFIED DISORDERS OF BLADDER: ICD-10-CM

## 2023-01-01 DIAGNOSIS — I08.2 RHEUMATIC DISORDERS OF BOTH AORTIC AND TRICUSPID VALVES: ICD-10-CM

## 2023-01-01 DIAGNOSIS — T83.89XA OTHER SPECIFIED COMPLICATION OF GENITOURINARY PROSTHETIC DEVICES, IMPLANTS AND GRAFTS, INITIAL ENCOUNTER: ICD-10-CM

## 2023-01-01 DIAGNOSIS — L89.312 PRESSURE ULCER OF RIGHT BUTTOCK, STAGE 2: ICD-10-CM

## 2023-01-01 DIAGNOSIS — J96.01 ACUTE RESPIRATORY FAILURE WITH HYPOXIA: ICD-10-CM
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
## 2023-01-01 DIAGNOSIS — T83.021A DISPLACEMENT OF INDWELLING URETHRAL CATHETER, INITIAL ENCOUNTER: ICD-10-CM

## 2023-01-01 DIAGNOSIS — I83.93 ASYMPTOMATIC VARICOSE VEINS OF BILATERAL LOWER EXTREMITIES: ICD-10-CM

## 2023-01-01 DIAGNOSIS — N21.0 CALCULUS IN BLADDER: ICD-10-CM

## 2023-01-01 DIAGNOSIS — R00.0 TACHYCARDIA, UNSPECIFIED: ICD-10-CM

## 2023-01-01 DIAGNOSIS — N13.8 BENIGN PROSTATIC HYPERPLASIA WITH LOWER URINARY TRACT SYMPMS: ICD-10-CM

## 2023-01-01 DIAGNOSIS — T45.516A UNDERDOSING OF ANTICOAGULANTS, INITIAL ENCOUNTER: ICD-10-CM

## 2023-01-01 DIAGNOSIS — N40.1 BENIGN PROSTATIC HYPERPLASIA WITH LOWER URINARY TRACT SYMPMS: ICD-10-CM

## 2023-01-01 DIAGNOSIS — R60.0 LOCALIZED EDEMA: ICD-10-CM

## 2023-01-01 DIAGNOSIS — R26.0 ATAXIC GAIT: ICD-10-CM

## 2023-01-01 LAB
-  AMIKACIN: SIGNIFICANT CHANGE UP
-  AMOXICILLIN/CLAVULANIC ACID: SIGNIFICANT CHANGE UP
-  AMPICILLIN/SULBACTAM: SIGNIFICANT CHANGE UP
-  AMPICILLIN: SIGNIFICANT CHANGE UP
-  AZTREONAM: SIGNIFICANT CHANGE UP
-  CEFAZOLIN: SIGNIFICANT CHANGE UP
-  CEFEPIME: SIGNIFICANT CHANGE UP
-  CEFOXITIN: SIGNIFICANT CHANGE UP
-  CEFTRIAXONE: SIGNIFICANT CHANGE UP
-  CIPROFLOXACIN: SIGNIFICANT CHANGE UP
-  ERTAPENEM: SIGNIFICANT CHANGE UP
-  GENTAMICIN: SIGNIFICANT CHANGE UP
-  IMIPENEM: SIGNIFICANT CHANGE UP
-  LEVOFLOXACIN: SIGNIFICANT CHANGE UP
-  MEROPENEM: SIGNIFICANT CHANGE UP
-  NITROFURANTOIN: SIGNIFICANT CHANGE UP
-  PIPERACILLIN/TAZOBACTAM: SIGNIFICANT CHANGE UP
-  TOBRAMYCIN: SIGNIFICANT CHANGE UP
-  TRIMETHOPRIM/SULFAMETHOXAZOLE: SIGNIFICANT CHANGE UP
24R-OH-CALCIDIOL SERPL-MCNC: 23.2 NG/ML — LOW (ref 30–80)
A1C WITH ESTIMATED AVERAGE GLUCOSE RESULT: 5.9 % — HIGH (ref 4–5.6)
ALBUMIN SERPL ELPH-MCNC: 2.3 G/DL — LOW (ref 3.3–5)
ALBUMIN SERPL ELPH-MCNC: 2.5 G/DL — LOW (ref 3.3–5)
ALBUMIN SERPL ELPH-MCNC: 2.7 G/DL — LOW (ref 3.3–5)
ALBUMIN SERPL ELPH-MCNC: 2.7 G/DL — LOW (ref 3.3–5)
ALBUMIN SERPL ELPH-MCNC: 3.4 G/DL — SIGNIFICANT CHANGE UP (ref 3.3–5)
ALBUMIN SERPL ELPH-MCNC: 3.7 G/DL — SIGNIFICANT CHANGE UP (ref 3.3–5)
ALP SERPL-CCNC: 106 U/L — SIGNIFICANT CHANGE UP (ref 40–120)
ALP SERPL-CCNC: 43 U/L — SIGNIFICANT CHANGE UP (ref 40–120)
ALP SERPL-CCNC: 58 U/L — SIGNIFICANT CHANGE UP (ref 40–120)
ALP SERPL-CCNC: 82 U/L — SIGNIFICANT CHANGE UP (ref 40–120)
ALP SERPL-CCNC: 85 U/L — SIGNIFICANT CHANGE UP (ref 40–120)
ALP SERPL-CCNC: 85 U/L — SIGNIFICANT CHANGE UP (ref 40–120)
ALT FLD-CCNC: 14 U/L — SIGNIFICANT CHANGE UP (ref 12–78)
ALT FLD-CCNC: 17 U/L — SIGNIFICANT CHANGE UP (ref 12–78)
ALT FLD-CCNC: 17 U/L — SIGNIFICANT CHANGE UP (ref 12–78)
ALT FLD-CCNC: 18 U/L — SIGNIFICANT CHANGE UP (ref 12–78)
ALT FLD-CCNC: 18 U/L — SIGNIFICANT CHANGE UP (ref 12–78)
ALT FLD-CCNC: 32 U/L — SIGNIFICANT CHANGE UP (ref 12–78)
AMMONIA BLD-MCNC: 23 UMOL/L — SIGNIFICANT CHANGE UP (ref 11–32)
AMMONIA BLD-MCNC: <10 UMOL/L — LOW (ref 11–32)
ANION GAP SERPL CALC-SCNC: 3 MMOL/L — LOW (ref 5–17)
ANION GAP SERPL CALC-SCNC: 3 MMOL/L — LOW (ref 5–17)
ANION GAP SERPL CALC-SCNC: 4 MMOL/L — LOW (ref 5–17)
ANION GAP SERPL CALC-SCNC: 4 MMOL/L — LOW (ref 5–17)
ANION GAP SERPL CALC-SCNC: 5 MMOL/L — SIGNIFICANT CHANGE UP (ref 5–17)
ANION GAP SERPL CALC-SCNC: 5 MMOL/L — SIGNIFICANT CHANGE UP (ref 5–17)
ANION GAP SERPL CALC-SCNC: 6 MMOL/L — SIGNIFICANT CHANGE UP (ref 5–17)
ANION GAP SERPL CALC-SCNC: 6 MMOL/L — SIGNIFICANT CHANGE UP (ref 5–17)
ANION GAP SERPL CALC-SCNC: 8 MMOL/L — SIGNIFICANT CHANGE UP (ref 5–17)
ANION GAP SERPL CALC-SCNC: 8 MMOL/L — SIGNIFICANT CHANGE UP (ref 5–17)
ANISOCYTOSIS BLD QL: SLIGHT — SIGNIFICANT CHANGE UP
APPEARANCE UR: ABNORMAL
APPEARANCE UR: ABNORMAL
APPEARANCE UR: CLEAR — SIGNIFICANT CHANGE UP
APPEARANCE: ABNORMAL
APTT BLD: 25.7 SEC — SIGNIFICANT CHANGE UP (ref 24.5–35.6)
APTT BLD: 32.1 SEC — SIGNIFICANT CHANGE UP (ref 27.5–35.5)
APTT BLD: 34 SEC — SIGNIFICANT CHANGE UP (ref 24.5–35.6)
APTT BLD: 34.6 SEC — SIGNIFICANT CHANGE UP (ref 27.5–35.5)
APTT BLD: 34.9 SEC — SIGNIFICANT CHANGE UP (ref 24.5–35.6)
AST SERPL-CCNC: 100 U/L — HIGH (ref 15–37)
AST SERPL-CCNC: 16 U/L — SIGNIFICANT CHANGE UP (ref 15–37)
AST SERPL-CCNC: 17 U/L — SIGNIFICANT CHANGE UP (ref 15–37)
AST SERPL-CCNC: 19 U/L — SIGNIFICANT CHANGE UP (ref 15–37)
AST SERPL-CCNC: 20 U/L — SIGNIFICANT CHANGE UP (ref 15–37)
AST SERPL-CCNC: 41 U/L — HIGH (ref 15–37)
BACTERIA # UR AUTO: ABNORMAL
BACTERIA # UR AUTO: ABNORMAL
BACTERIA UR CULT: NORMAL
BACTERIA UR CULT: NORMAL
BACTERIA: ABNORMAL
BASE EXCESS BLDA CALC-SCNC: -3.4 MMOL/L — LOW (ref -2–3)
BASE EXCESS BLDA CALC-SCNC: -6.4 MMOL/L — LOW (ref -2–3)
BASOPHILS # BLD AUTO: 0 K/UL — SIGNIFICANT CHANGE UP (ref 0–0.2)
BASOPHILS # BLD AUTO: 0.01 K/UL — SIGNIFICANT CHANGE UP (ref 0–0.2)
BASOPHILS # BLD AUTO: 0.02 K/UL — SIGNIFICANT CHANGE UP (ref 0–0.2)
BASOPHILS # BLD AUTO: 0.03 K/UL — SIGNIFICANT CHANGE UP (ref 0–0.2)
BASOPHILS # BLD AUTO: 0.04 K/UL — SIGNIFICANT CHANGE UP (ref 0–0.2)
BASOPHILS NFR BLD AUTO: 0 % — SIGNIFICANT CHANGE UP (ref 0–2)
BASOPHILS NFR BLD AUTO: 0.1 % — SIGNIFICANT CHANGE UP (ref 0–2)
BASOPHILS NFR BLD AUTO: 0.2 % — SIGNIFICANT CHANGE UP (ref 0–2)
BASOPHILS NFR BLD AUTO: 0.5 % — SIGNIFICANT CHANGE UP (ref 0–2)
BASOPHILS NFR BLD AUTO: 0.8 % — SIGNIFICANT CHANGE UP (ref 0–2)
BILIRUB SERPL-MCNC: 0.5 MG/DL — SIGNIFICANT CHANGE UP (ref 0.2–1.2)
BILIRUB SERPL-MCNC: 0.6 MG/DL — SIGNIFICANT CHANGE UP (ref 0.2–1.2)
BILIRUB SERPL-MCNC: 0.7 MG/DL — SIGNIFICANT CHANGE UP (ref 0.2–1.2)
BILIRUB SERPL-MCNC: 0.8 MG/DL — SIGNIFICANT CHANGE UP (ref 0.2–1.2)
BILIRUB SERPL-MCNC: 0.9 MG/DL — SIGNIFICANT CHANGE UP (ref 0.2–1.2)
BILIRUB SERPL-MCNC: 1 MG/DL — SIGNIFICANT CHANGE UP (ref 0.2–1.2)
BILIRUB UR-MCNC: NEGATIVE — SIGNIFICANT CHANGE UP
BILIRUBIN URINE: NEGATIVE
BLOOD GAS COMMENTS ARTERIAL: SIGNIFICANT CHANGE UP
BLOOD GAS COMMENTS ARTERIAL: SIGNIFICANT CHANGE UP
BLOOD URINE: ABNORMAL
BUN SERPL-MCNC: 16 MG/DL — SIGNIFICANT CHANGE UP (ref 7–23)
BUN SERPL-MCNC: 17 MG/DL — SIGNIFICANT CHANGE UP (ref 7–23)
BUN SERPL-MCNC: 19 MG/DL — SIGNIFICANT CHANGE UP (ref 7–23)
BUN SERPL-MCNC: 20 MG/DL — SIGNIFICANT CHANGE UP (ref 7–23)
BUN SERPL-MCNC: 20 MG/DL — SIGNIFICANT CHANGE UP (ref 7–23)
BUN SERPL-MCNC: 23 MG/DL — SIGNIFICANT CHANGE UP (ref 7–23)
BUN SERPL-MCNC: 24 MG/DL — HIGH (ref 7–23)
BUN SERPL-MCNC: 33 MG/DL — HIGH (ref 7–23)
BUN SERPL-MCNC: 35 MG/DL — HIGH (ref 7–23)
BUN SERPL-MCNC: 51 MG/DL — HIGH (ref 7–23)
CALCIUM SERPL-MCNC: 8.7 MG/DL — SIGNIFICANT CHANGE UP (ref 8.5–10.1)
CALCIUM SERPL-MCNC: 8.7 MG/DL — SIGNIFICANT CHANGE UP (ref 8.5–10.1)
CALCIUM SERPL-MCNC: 8.8 MG/DL — SIGNIFICANT CHANGE UP (ref 8.5–10.1)
CALCIUM SERPL-MCNC: 8.9 MG/DL — SIGNIFICANT CHANGE UP (ref 8.5–10.1)
CALCIUM SERPL-MCNC: 9.3 MG/DL — SIGNIFICANT CHANGE UP (ref 8.5–10.1)
CALCIUM SERPL-MCNC: 9.7 MG/DL — SIGNIFICANT CHANGE UP (ref 8.5–10.1)
CALCIUM SERPL-MCNC: 9.8 MG/DL — SIGNIFICANT CHANGE UP (ref 8.5–10.1)
CALCIUM SERPL-MCNC: 9.9 MG/DL — SIGNIFICANT CHANGE UP (ref 8.5–10.1)
CHLORIDE SERPL-SCNC: 100 MMOL/L — SIGNIFICANT CHANGE UP (ref 96–108)
CHLORIDE SERPL-SCNC: 102 MMOL/L — SIGNIFICANT CHANGE UP (ref 96–108)
CHLORIDE SERPL-SCNC: 103 MMOL/L — SIGNIFICANT CHANGE UP (ref 96–108)
CHLORIDE SERPL-SCNC: 104 MMOL/L — SIGNIFICANT CHANGE UP (ref 96–108)
CHLORIDE SERPL-SCNC: 106 MMOL/L — SIGNIFICANT CHANGE UP (ref 96–108)
CHLORIDE SERPL-SCNC: 107 MMOL/L — SIGNIFICANT CHANGE UP (ref 96–108)
CHLORIDE SERPL-SCNC: 107 MMOL/L — SIGNIFICANT CHANGE UP (ref 96–108)
CHLORIDE SERPL-SCNC: 108 MMOL/L — SIGNIFICANT CHANGE UP (ref 96–108)
CHLORIDE SERPL-SCNC: 108 MMOL/L — SIGNIFICANT CHANGE UP (ref 96–108)
CHLORIDE SERPL-SCNC: 110 MMOL/L — HIGH (ref 96–108)
CHOLEST SERPL-MCNC: 79 MG/DL — SIGNIFICANT CHANGE UP
CK SERPL-CCNC: 29 U/L — SIGNIFICANT CHANGE UP (ref 26–308)
CO2 SERPL-SCNC: 20 MMOL/L — LOW (ref 22–31)
CO2 SERPL-SCNC: 21 MMOL/L — LOW (ref 22–31)
CO2 SERPL-SCNC: 22 MMOL/L — SIGNIFICANT CHANGE UP (ref 22–31)
CO2 SERPL-SCNC: 25 MMOL/L — SIGNIFICANT CHANGE UP (ref 22–31)
CO2 SERPL-SCNC: 26 MMOL/L — SIGNIFICANT CHANGE UP (ref 22–31)
CO2 SERPL-SCNC: 27 MMOL/L — SIGNIFICANT CHANGE UP (ref 22–31)
CO2 SERPL-SCNC: 27 MMOL/L — SIGNIFICANT CHANGE UP (ref 22–31)
CO2 SERPL-SCNC: 28 MMOL/L — SIGNIFICANT CHANGE UP (ref 22–31)
CO2 SERPL-SCNC: 28 MMOL/L — SIGNIFICANT CHANGE UP (ref 22–31)
CO2 SERPL-SCNC: 30 MMOL/L — SIGNIFICANT CHANGE UP (ref 22–31)
COLOR SPEC: ABNORMAL
COLOR SPEC: ABNORMAL
COLOR SPEC: SIGNIFICANT CHANGE UP
COLOR: ABNORMAL
CREAT SERPL-MCNC: 0.72 MG/DL — SIGNIFICANT CHANGE UP (ref 0.5–1.3)
CREAT SERPL-MCNC: 0.73 MG/DL — SIGNIFICANT CHANGE UP (ref 0.5–1.3)
CREAT SERPL-MCNC: 0.76 MG/DL — SIGNIFICANT CHANGE UP (ref 0.5–1.3)
CREAT SERPL-MCNC: 0.78 MG/DL — SIGNIFICANT CHANGE UP (ref 0.5–1.3)
CREAT SERPL-MCNC: 0.87 MG/DL — SIGNIFICANT CHANGE UP (ref 0.5–1.3)
CREAT SERPL-MCNC: 1.02 MG/DL — SIGNIFICANT CHANGE UP (ref 0.5–1.3)
CREAT SERPL-MCNC: 1.08 MG/DL — SIGNIFICANT CHANGE UP (ref 0.5–1.3)
CREAT SERPL-MCNC: 1.15 MG/DL — SIGNIFICANT CHANGE UP (ref 0.5–1.3)
CREAT SERPL-MCNC: 1.18 MG/DL — SIGNIFICANT CHANGE UP (ref 0.5–1.3)
CREAT SERPL-MCNC: 1.46 MG/DL — HIGH (ref 0.5–1.3)
CRP SERPL-MCNC: 16 MG/L — HIGH
CRP SERPL-MCNC: 22 MG/L — HIGH
CULTURE RESULTS: SIGNIFICANT CHANGE UP
DIFF PNL FLD: ABNORMAL
EGFR: 47 ML/MIN/1.73M2 — LOW
EGFR: 61 ML/MIN/1.73M2 — SIGNIFICANT CHANGE UP
EGFR: 63 ML/MIN/1.73M2 — SIGNIFICANT CHANGE UP
EGFR: 68 ML/MIN/1.73M2 — SIGNIFICANT CHANGE UP
EGFR: 73 ML/MIN/1.73M2 — SIGNIFICANT CHANGE UP
EGFR: 86 ML/MIN/1.73M2 — SIGNIFICANT CHANGE UP
EGFR: 88 ML/MIN/1.73M2 — SIGNIFICANT CHANGE UP
EGFR: 89 ML/MIN/1.73M2 — SIGNIFICANT CHANGE UP
EGFR: 90 ML/MIN/1.73M2 — SIGNIFICANT CHANGE UP
EGFR: 91 ML/MIN/1.73M2 — SIGNIFICANT CHANGE UP
EOSINOPHIL # BLD AUTO: 0 K/UL — SIGNIFICANT CHANGE UP (ref 0–0.5)
EOSINOPHIL # BLD AUTO: 0.01 K/UL — SIGNIFICANT CHANGE UP (ref 0–0.5)
EOSINOPHIL # BLD AUTO: 0.02 K/UL — SIGNIFICANT CHANGE UP (ref 0–0.5)
EOSINOPHIL # BLD AUTO: 0.21 K/UL — SIGNIFICANT CHANGE UP (ref 0–0.5)
EOSINOPHIL # BLD AUTO: 0.25 K/UL — SIGNIFICANT CHANGE UP (ref 0–0.5)
EOSINOPHIL NFR BLD AUTO: 0 % — SIGNIFICANT CHANGE UP (ref 0–6)
EOSINOPHIL NFR BLD AUTO: 0.1 % — SIGNIFICANT CHANGE UP (ref 0–6)
EOSINOPHIL NFR BLD AUTO: 0.2 % — SIGNIFICANT CHANGE UP (ref 0–6)
EOSINOPHIL NFR BLD AUTO: 4.2 % — SIGNIFICANT CHANGE UP (ref 0–6)
EOSINOPHIL NFR BLD AUTO: 4.3 % — SIGNIFICANT CHANGE UP (ref 0–6)
EPI CELLS # UR: SIGNIFICANT CHANGE UP
EPI CELLS # UR: SIGNIFICANT CHANGE UP
ERYTHROCYTE [SEDIMENTATION RATE] IN BLOOD: 46 MM/HR — HIGH (ref 0–20)
ESTIMATED AVERAGE GLUCOSE: 123 MG/DL — HIGH (ref 68–114)
ETHANOL SERPL-MCNC: <10 MG/DL — SIGNIFICANT CHANGE UP (ref 0–10)
FERRITIN SERPL-MCNC: 145 NG/ML — SIGNIFICANT CHANGE UP (ref 30–400)
FOLATE SERPL-MCNC: 7 NG/ML — SIGNIFICANT CHANGE UP
GLUCOSE QUALITATIVE U: NEGATIVE
GLUCOSE SERPL-MCNC: 101 MG/DL — HIGH (ref 70–99)
GLUCOSE SERPL-MCNC: 117 MG/DL — HIGH (ref 70–99)
GLUCOSE SERPL-MCNC: 123 MG/DL — HIGH (ref 70–99)
GLUCOSE SERPL-MCNC: 161 MG/DL — HIGH (ref 70–99)
GLUCOSE SERPL-MCNC: 88 MG/DL — SIGNIFICANT CHANGE UP (ref 70–99)
GLUCOSE SERPL-MCNC: 89 MG/DL — SIGNIFICANT CHANGE UP (ref 70–99)
GLUCOSE SERPL-MCNC: 90 MG/DL — SIGNIFICANT CHANGE UP (ref 70–99)
GLUCOSE SERPL-MCNC: 92 MG/DL — SIGNIFICANT CHANGE UP (ref 70–99)
GLUCOSE SERPL-MCNC: 94 MG/DL — SIGNIFICANT CHANGE UP (ref 70–99)
GLUCOSE SERPL-MCNC: 96 MG/DL — SIGNIFICANT CHANGE UP (ref 70–99)
GLUCOSE UR QL: NEGATIVE MG/DL — SIGNIFICANT CHANGE UP
GLUCOSE UR QL: NEGATIVE — SIGNIFICANT CHANGE UP
GLUCOSE UR QL: NEGATIVE — SIGNIFICANT CHANGE UP
GRAM STN FLD: SIGNIFICANT CHANGE UP
HCO3 BLDA-SCNC: 18 MMOL/L — LOW (ref 21–28)
HCO3 BLDA-SCNC: 21 MMOL/L — SIGNIFICANT CHANGE UP (ref 21–28)
HCT VFR BLD CALC: 38.6 % — LOW (ref 39–50)
HCT VFR BLD CALC: 38.7 % — LOW (ref 39–50)
HCT VFR BLD CALC: 40 % — SIGNIFICANT CHANGE UP (ref 39–50)
HCT VFR BLD CALC: 40.5 % — SIGNIFICANT CHANGE UP (ref 39–50)
HCT VFR BLD CALC: 40.9 % — SIGNIFICANT CHANGE UP (ref 39–50)
HCT VFR BLD CALC: 41.3 % — SIGNIFICANT CHANGE UP (ref 39–50)
HCT VFR BLD CALC: 42.6 % — SIGNIFICANT CHANGE UP (ref 39–50)
HCT VFR BLD CALC: 42.9 % — SIGNIFICANT CHANGE UP (ref 39–50)
HCT VFR BLD CALC: 48.5 % — SIGNIFICANT CHANGE UP (ref 39–50)
HDLC SERPL-MCNC: 24 MG/DL — LOW
HGB BLD-MCNC: 12.5 G/DL — LOW (ref 13–17)
HGB BLD-MCNC: 12.8 G/DL — LOW (ref 13–17)
HGB BLD-MCNC: 13.1 G/DL — SIGNIFICANT CHANGE UP (ref 13–17)
HGB BLD-MCNC: 13.2 G/DL — SIGNIFICANT CHANGE UP (ref 13–17)
HGB BLD-MCNC: 13.5 G/DL — SIGNIFICANT CHANGE UP (ref 13–17)
HGB BLD-MCNC: 13.8 G/DL — SIGNIFICANT CHANGE UP (ref 13–17)
HGB BLD-MCNC: 13.9 G/DL — SIGNIFICANT CHANGE UP (ref 13–17)
HGB BLD-MCNC: 14.1 G/DL — SIGNIFICANT CHANGE UP (ref 13–17)
HGB BLD-MCNC: 16.1 G/DL — SIGNIFICANT CHANGE UP (ref 13–17)
HYALINE CASTS: 0 /LPF
HYPOSEGMENTATION: PRESENT — SIGNIFICANT CHANGE UP
IMM GRANULOCYTES NFR BLD AUTO: 0.2 % — SIGNIFICANT CHANGE UP (ref 0–0.9)
IMM GRANULOCYTES NFR BLD AUTO: 0.2 % — SIGNIFICANT CHANGE UP (ref 0–0.9)
IMM GRANULOCYTES NFR BLD AUTO: 0.3 % — SIGNIFICANT CHANGE UP (ref 0–0.9)
IMM GRANULOCYTES NFR BLD AUTO: 1.8 % — HIGH (ref 0–0.9)
INR BLD: 0.96 RATIO — SIGNIFICANT CHANGE UP (ref 0.85–1.18)
INR BLD: 1.19 RATIO — HIGH (ref 0.88–1.16)
INR BLD: 1.23 RATIO — HIGH (ref 0.88–1.16)
INR BLD: 1.3 RATIO — HIGH (ref 0.85–1.18)
INR BLD: 1.35 RATIO — HIGH (ref 0.85–1.18)
IRON SATN MFR SERPL: 14 % — LOW (ref 16–55)
IRON SATN MFR SERPL: 44 UG/DL — LOW (ref 45–165)
KETONES UR-MCNC: NEGATIVE MG/DL — SIGNIFICANT CHANGE UP
KETONES UR-MCNC: NEGATIVE — SIGNIFICANT CHANGE UP
KETONES UR-MCNC: NEGATIVE — SIGNIFICANT CHANGE UP
KETONES URINE: NEGATIVE
LACTATE SERPL-SCNC: 1.1 MMOL/L — SIGNIFICANT CHANGE UP (ref 0.7–2)
LACTATE SERPL-SCNC: 2.5 MMOL/L — HIGH (ref 0.7–2)
LEGIONELLA AG UR QL: POSITIVE
LEUKOCYTE ESTERASE UR-ACNC: ABNORMAL
LEUKOCYTE ESTERASE URINE: ABNORMAL
LIPID PNL WITH DIRECT LDL SERPL: 39 MG/DL — SIGNIFICANT CHANGE UP
LYMPHOCYTES # BLD AUTO: 0.18 K/UL — LOW (ref 1–3.3)
LYMPHOCYTES # BLD AUTO: 0.18 K/UL — LOW (ref 1–3.3)
LYMPHOCYTES # BLD AUTO: 0.88 K/UL — LOW (ref 1–3.3)
LYMPHOCYTES # BLD AUTO: 1.25 K/UL — SIGNIFICANT CHANGE UP (ref 1–3.3)
LYMPHOCYTES # BLD AUTO: 1.37 K/UL — SIGNIFICANT CHANGE UP (ref 1–3.3)
LYMPHOCYTES # BLD AUTO: 2.5 % — LOW (ref 13–44)
LYMPHOCYTES # BLD AUTO: 23 % — SIGNIFICANT CHANGE UP (ref 13–44)
LYMPHOCYTES # BLD AUTO: 25.7 % — SIGNIFICANT CHANGE UP (ref 13–44)
LYMPHOCYTES # BLD AUTO: 3 % — LOW (ref 13–44)
LYMPHOCYTES # BLD AUTO: 9.7 % — LOW (ref 13–44)
MACROCYTES BLD QL: SLIGHT — SIGNIFICANT CHANGE UP
MAGNESIUM SERPL-MCNC: 2.1 MG/DL — SIGNIFICANT CHANGE UP (ref 1.6–2.6)
MAGNESIUM SERPL-MCNC: 2.2 MG/DL — SIGNIFICANT CHANGE UP (ref 1.6–2.6)
MAGNESIUM SERPL-MCNC: 2.3 MG/DL — SIGNIFICANT CHANGE UP (ref 1.6–2.6)
MAGNESIUM SERPL-MCNC: 3.2 MG/DL — HIGH (ref 1.6–2.6)
MANUAL SMEAR VERIFICATION: SIGNIFICANT CHANGE UP
MCHC RBC-ENTMCNC: 31.7 PG — SIGNIFICANT CHANGE UP (ref 27–34)
MCHC RBC-ENTMCNC: 32 PG — SIGNIFICANT CHANGE UP (ref 27–34)
MCHC RBC-ENTMCNC: 32.1 PG — SIGNIFICANT CHANGE UP (ref 27–34)
MCHC RBC-ENTMCNC: 32.1 PG — SIGNIFICANT CHANGE UP (ref 27–34)
MCHC RBC-ENTMCNC: 32.2 GM/DL — SIGNIFICANT CHANGE UP (ref 32–36)
MCHC RBC-ENTMCNC: 32.2 PG — SIGNIFICANT CHANGE UP (ref 27–34)
MCHC RBC-ENTMCNC: 32.3 GM/DL — SIGNIFICANT CHANGE UP (ref 32–36)
MCHC RBC-ENTMCNC: 32.3 GM/DL — SIGNIFICANT CHANGE UP (ref 32–36)
MCHC RBC-ENTMCNC: 32.3 PG — SIGNIFICANT CHANGE UP (ref 27–34)
MCHC RBC-ENTMCNC: 32.6 GM/DL — SIGNIFICANT CHANGE UP (ref 32–36)
MCHC RBC-ENTMCNC: 32.6 PG — SIGNIFICANT CHANGE UP (ref 27–34)
MCHC RBC-ENTMCNC: 32.8 GM/DL — SIGNIFICANT CHANGE UP (ref 32–36)
MCHC RBC-ENTMCNC: 33.2 GM/DL — SIGNIFICANT CHANGE UP (ref 32–36)
MCHC RBC-ENTMCNC: 33.2 GM/DL — SIGNIFICANT CHANGE UP (ref 32–36)
MCHC RBC-ENTMCNC: 33.3 GM/DL — SIGNIFICANT CHANGE UP (ref 32–36)
MCHC RBC-ENTMCNC: 34.1 GM/DL — SIGNIFICANT CHANGE UP (ref 32–36)
MCV RBC AUTO: 100.5 FL — HIGH (ref 80–100)
MCV RBC AUTO: 95.6 FL — SIGNIFICANT CHANGE UP (ref 80–100)
MCV RBC AUTO: 96.7 FL — SIGNIFICANT CHANGE UP (ref 80–100)
MCV RBC AUTO: 97 FL — SIGNIFICANT CHANGE UP (ref 80–100)
MCV RBC AUTO: 97.2 FL — SIGNIFICANT CHANGE UP (ref 80–100)
MCV RBC AUTO: 97.3 FL — SIGNIFICANT CHANGE UP (ref 80–100)
MCV RBC AUTO: 98 FL — SIGNIFICANT CHANGE UP (ref 80–100)
MCV RBC AUTO: 99.3 FL — SIGNIFICANT CHANGE UP (ref 80–100)
MCV RBC AUTO: 99.5 FL — SIGNIFICANT CHANGE UP (ref 80–100)
METHOD TYPE: SIGNIFICANT CHANGE UP
MICROSCOPIC-UA: NORMAL
MONOCYTES # BLD AUTO: 0.12 K/UL — SIGNIFICANT CHANGE UP (ref 0–0.9)
MONOCYTES # BLD AUTO: 0.24 K/UL — SIGNIFICANT CHANGE UP (ref 0–0.9)
MONOCYTES # BLD AUTO: 0.53 K/UL — SIGNIFICANT CHANGE UP (ref 0–0.9)
MONOCYTES # BLD AUTO: 0.67 K/UL — SIGNIFICANT CHANGE UP (ref 0–0.9)
MONOCYTES # BLD AUTO: 0.86 K/UL — SIGNIFICANT CHANGE UP (ref 0–0.9)
MONOCYTES NFR BLD AUTO: 1.7 % — LOW (ref 2–14)
MONOCYTES NFR BLD AUTO: 10.9 % — SIGNIFICANT CHANGE UP (ref 2–14)
MONOCYTES NFR BLD AUTO: 11.2 % — SIGNIFICANT CHANGE UP (ref 2–14)
MONOCYTES NFR BLD AUTO: 4 % — SIGNIFICANT CHANGE UP (ref 2–14)
MONOCYTES NFR BLD AUTO: 9.5 % — SIGNIFICANT CHANGE UP (ref 2–14)
MRSA PCR RESULT.: SIGNIFICANT CHANGE UP
NEUTROPHILS # BLD AUTO: 2.83 K/UL — SIGNIFICANT CHANGE UP (ref 1.8–7.4)
NEUTROPHILS # BLD AUTO: 3.62 K/UL — SIGNIFICANT CHANGE UP (ref 1.8–7.4)
NEUTROPHILS # BLD AUTO: 5.49 K/UL — SIGNIFICANT CHANGE UP (ref 1.8–7.4)
NEUTROPHILS # BLD AUTO: 6.82 K/UL — SIGNIFICANT CHANGE UP (ref 1.8–7.4)
NEUTROPHILS # BLD AUTO: 7.23 K/UL — SIGNIFICANT CHANGE UP (ref 1.8–7.4)
NEUTROPHILS NFR BLD AUTO: 58.1 % — SIGNIFICANT CHANGE UP (ref 43–77)
NEUTROPHILS NFR BLD AUTO: 60.8 % — SIGNIFICANT CHANGE UP (ref 43–77)
NEUTROPHILS NFR BLD AUTO: 80.2 % — HIGH (ref 43–77)
NEUTROPHILS NFR BLD AUTO: 87 % — HIGH (ref 43–77)
NEUTROPHILS NFR BLD AUTO: 93.8 % — HIGH (ref 43–77)
NEUTS BAND # BLD: 6 % — SIGNIFICANT CHANGE UP (ref 0–8)
NEUTS VAC BLD QL SMEAR: SLIGHT — SIGNIFICANT CHANGE UP
NITRITE UR-MCNC: NEGATIVE — SIGNIFICANT CHANGE UP
NITRITE UR-MCNC: POSITIVE
NITRITE UR-MCNC: POSITIVE
NITRITE URINE: NEGATIVE
NON HDL CHOLESTEROL: 56 MG/DL — SIGNIFICANT CHANGE UP
NRBC # BLD: 0 /100 — SIGNIFICANT CHANGE UP (ref 0–0)
NRBC # BLD: SIGNIFICANT CHANGE UP /100 WBCS (ref 0–0)
NT-PROBNP SERPL-SCNC: 1259 PG/ML — HIGH (ref 0–450)
NT-PROBNP SERPL-SCNC: 871 PG/ML — HIGH (ref 0–450)
ORGANISM # SPEC MICROSCOPIC CNT: SIGNIFICANT CHANGE UP
ORGANISM # SPEC MICROSCOPIC CNT: SIGNIFICANT CHANGE UP
PCO2 BLDA: 33 MMHG — LOW (ref 35–48)
PCO2 BLDA: 37 MMHG — SIGNIFICANT CHANGE UP (ref 35–48)
PH BLDA: 7.35 — SIGNIFICANT CHANGE UP (ref 7.35–7.45)
PH BLDA: 7.37 — SIGNIFICANT CHANGE UP (ref 7.35–7.45)
PH UR: 6 — SIGNIFICANT CHANGE UP (ref 5–8)
PH UR: 6.5 — SIGNIFICANT CHANGE UP (ref 5–8)
PH UR: 9 — HIGH (ref 5–8)
PH URINE: 7.5
PHOSPHATE SERPL-MCNC: 3.1 MG/DL — SIGNIFICANT CHANGE UP (ref 2.5–4.5)
PHOSPHATE SERPL-MCNC: 3.1 MG/DL — SIGNIFICANT CHANGE UP (ref 2.5–4.5)
PHOSPHATE SERPL-MCNC: 3.3 MG/DL — SIGNIFICANT CHANGE UP (ref 2.5–4.5)
PHOSPHATE SERPL-MCNC: 3.5 MG/DL — SIGNIFICANT CHANGE UP (ref 2.5–4.5)
PLAT MORPH BLD: NORMAL — SIGNIFICANT CHANGE UP
PLATELET # BLD AUTO: 157 K/UL — SIGNIFICANT CHANGE UP (ref 150–400)
PLATELET # BLD AUTO: 159 K/UL — SIGNIFICANT CHANGE UP (ref 150–400)
PLATELET # BLD AUTO: 169 K/UL — SIGNIFICANT CHANGE UP (ref 150–400)
PLATELET # BLD AUTO: 172 K/UL — SIGNIFICANT CHANGE UP (ref 150–400)
PLATELET # BLD AUTO: 183 K/UL — SIGNIFICANT CHANGE UP (ref 150–400)
PLATELET # BLD AUTO: 190 K/UL — SIGNIFICANT CHANGE UP (ref 150–400)
PLATELET # BLD AUTO: 201 K/UL — SIGNIFICANT CHANGE UP (ref 150–400)
PLATELET # BLD AUTO: 202 K/UL — SIGNIFICANT CHANGE UP (ref 150–400)
PLATELET # BLD AUTO: 217 K/UL — SIGNIFICANT CHANGE UP (ref 150–400)
PO2 BLDA: 104 MMHG — SIGNIFICANT CHANGE UP (ref 83–108)
PO2 BLDA: 64 MMHG — LOW (ref 83–108)
POLYCHROMASIA BLD QL SMEAR: SLIGHT — SIGNIFICANT CHANGE UP
POTASSIUM SERPL-MCNC: 3.7 MMOL/L — SIGNIFICANT CHANGE UP (ref 3.5–5.3)
POTASSIUM SERPL-MCNC: 3.8 MMOL/L — SIGNIFICANT CHANGE UP (ref 3.5–5.3)
POTASSIUM SERPL-MCNC: 3.9 MMOL/L — SIGNIFICANT CHANGE UP (ref 3.5–5.3)
POTASSIUM SERPL-MCNC: 3.9 MMOL/L — SIGNIFICANT CHANGE UP (ref 3.5–5.3)
POTASSIUM SERPL-MCNC: 4 MMOL/L — SIGNIFICANT CHANGE UP (ref 3.5–5.3)
POTASSIUM SERPL-MCNC: 4.1 MMOL/L — SIGNIFICANT CHANGE UP (ref 3.5–5.3)
POTASSIUM SERPL-MCNC: 4.2 MMOL/L — SIGNIFICANT CHANGE UP (ref 3.5–5.3)
POTASSIUM SERPL-MCNC: 4.3 MMOL/L — SIGNIFICANT CHANGE UP (ref 3.5–5.3)
POTASSIUM SERPL-MCNC: 4.4 MMOL/L — SIGNIFICANT CHANGE UP (ref 3.5–5.3)
POTASSIUM SERPL-MCNC: 4.5 MMOL/L — SIGNIFICANT CHANGE UP (ref 3.5–5.3)
POTASSIUM SERPL-SCNC: 3.7 MMOL/L — SIGNIFICANT CHANGE UP (ref 3.5–5.3)
POTASSIUM SERPL-SCNC: 3.8 MMOL/L — SIGNIFICANT CHANGE UP (ref 3.5–5.3)
POTASSIUM SERPL-SCNC: 3.9 MMOL/L — SIGNIFICANT CHANGE UP (ref 3.5–5.3)
POTASSIUM SERPL-SCNC: 3.9 MMOL/L — SIGNIFICANT CHANGE UP (ref 3.5–5.3)
POTASSIUM SERPL-SCNC: 4 MMOL/L — SIGNIFICANT CHANGE UP (ref 3.5–5.3)
POTASSIUM SERPL-SCNC: 4.1 MMOL/L — SIGNIFICANT CHANGE UP (ref 3.5–5.3)
POTASSIUM SERPL-SCNC: 4.2 MMOL/L — SIGNIFICANT CHANGE UP (ref 3.5–5.3)
POTASSIUM SERPL-SCNC: 4.3 MMOL/L — SIGNIFICANT CHANGE UP (ref 3.5–5.3)
POTASSIUM SERPL-SCNC: 4.4 MMOL/L — SIGNIFICANT CHANGE UP (ref 3.5–5.3)
POTASSIUM SERPL-SCNC: 4.5 MMOL/L — SIGNIFICANT CHANGE UP (ref 3.5–5.3)
PROT SERPL-MCNC: 6.4 GM/DL — SIGNIFICANT CHANGE UP (ref 6–8.3)
PROT SERPL-MCNC: 6.7 GM/DL — SIGNIFICANT CHANGE UP (ref 6–8.3)
PROT SERPL-MCNC: 6.7 GM/DL — SIGNIFICANT CHANGE UP (ref 6–8.3)
PROT SERPL-MCNC: 7.4 GM/DL — SIGNIFICANT CHANGE UP (ref 6–8.3)
PROT SERPL-MCNC: 7.6 GM/DL — SIGNIFICANT CHANGE UP (ref 6–8.3)
PROT SERPL-MCNC: 7.7 GM/DL — SIGNIFICANT CHANGE UP (ref 6–8.3)
PROT UR-MCNC: 100 MG/DL
PROT UR-MCNC: 300 MG/DL — SIGNIFICANT CHANGE UP
PROT UR-MCNC: 500 MG/DL
PROTEIN URINE: ABNORMAL
PROTHROM AB SERPL-ACNC: 10.9 SEC — SIGNIFICANT CHANGE UP (ref 9.5–13)
PROTHROM AB SERPL-ACNC: 13.8 SEC — HIGH (ref 10.5–13.4)
PROTHROM AB SERPL-ACNC: 14.3 SEC — HIGH (ref 10.5–13.4)
PROTHROM AB SERPL-ACNC: 14.6 SEC — HIGH (ref 9.5–13)
PROTHROM AB SERPL-ACNC: 15.1 SEC — HIGH (ref 9.5–13)
RAPID RVP RESULT: DETECTED
RBC # BLD: 3.89 M/UL — LOW (ref 4.2–5.8)
RBC # BLD: 3.97 M/UL — LOW (ref 4.2–5.8)
RBC # BLD: 4.08 M/UL — LOW (ref 4.2–5.8)
RBC # BLD: 4.12 M/UL — LOW (ref 4.2–5.8)
RBC # BLD: 4.19 M/UL — LOW (ref 4.2–5.8)
RBC # BLD: 4.19 M/UL — LOW (ref 4.2–5.8)
RBC # BLD: 4.27 M/UL — SIGNIFICANT CHANGE UP (ref 4.2–5.8)
RBC # BLD: 4.32 M/UL — SIGNIFICANT CHANGE UP (ref 4.2–5.8)
RBC # BLD: 4.38 M/UL — SIGNIFICANT CHANGE UP (ref 4.2–5.8)
RBC # BLD: 5 M/UL — SIGNIFICANT CHANGE UP (ref 4.2–5.8)
RBC # FLD: 12.7 % — SIGNIFICANT CHANGE UP (ref 10.3–14.5)
RBC # FLD: 13.6 % — SIGNIFICANT CHANGE UP (ref 10.3–14.5)
RBC # FLD: 13.8 % — SIGNIFICANT CHANGE UP (ref 10.3–14.5)
RBC # FLD: 14.1 % — SIGNIFICANT CHANGE UP (ref 10.3–14.5)
RBC # FLD: 14.2 % — SIGNIFICANT CHANGE UP (ref 10.3–14.5)
RBC # FLD: 14.7 % — HIGH (ref 10.3–14.5)
RBC # FLD: 15 % — HIGH (ref 10.3–14.5)
RBC # FLD: 15.1 % — HIGH (ref 10.3–14.5)
RBC # FLD: 15.6 % — HIGH (ref 10.3–14.5)
RBC BLD AUTO: SIGNIFICANT CHANGE UP
RBC CASTS # UR COMP ASSIST: >50 /HPF (ref 0–4)
RBC CASTS # UR COMP ASSIST: ABNORMAL /HPF (ref 0–4)
RED BLOOD CELLS URINE: 54 /HPF
RETICS #: 51.5 K/UL — SIGNIFICANT CHANGE UP (ref 25–125)
RETICS/RBC NFR: 1.2 % — SIGNIFICANT CHANGE UP (ref 0.5–2.5)
RV+EV RNA SPEC QL NAA+PROBE: DETECTED
S AUREUS DNA NOSE QL NAA+PROBE: SIGNIFICANT CHANGE UP
S PNEUM AG UR QL: NEGATIVE — SIGNIFICANT CHANGE UP
SAO2 % BLDA: 93 % — LOW (ref 94–98)
SAO2 % BLDA: 99 % — HIGH (ref 94–98)
SARS-COV-2 RNA SPEC QL NAA+PROBE: SIGNIFICANT CHANGE UP
SODIUM SERPL-SCNC: 128 MMOL/L — LOW (ref 135–145)
SODIUM SERPL-SCNC: 130 MMOL/L — LOW (ref 135–145)
SODIUM SERPL-SCNC: 132 MMOL/L — LOW (ref 135–145)
SODIUM SERPL-SCNC: 136 MMOL/L — SIGNIFICANT CHANGE UP (ref 135–145)
SODIUM SERPL-SCNC: 138 MMOL/L — SIGNIFICANT CHANGE UP (ref 135–145)
SODIUM SERPL-SCNC: 138 MMOL/L — SIGNIFICANT CHANGE UP (ref 135–145)
SODIUM SERPL-SCNC: 139 MMOL/L — SIGNIFICANT CHANGE UP (ref 135–145)
SODIUM SERPL-SCNC: 142 MMOL/L — SIGNIFICANT CHANGE UP (ref 135–145)
SP GR SPEC: 1.01 — SIGNIFICANT CHANGE UP (ref 1.01–1.02)
SP GR SPEC: 1.01 — SIGNIFICANT CHANGE UP (ref 1.01–1.02)
SP GR SPEC: 1.01 — SIGNIFICANT CHANGE UP (ref 1–1.03)
SPECIFIC GRAVITY URINE: 1.02
SPECIMEN SOURCE: SIGNIFICANT CHANGE UP
SQUAMOUS EPITHELIAL CELLS: 1 /HPF
T3 SERPL-MCNC: 58 NG/DL — LOW (ref 80–200)
T4 AB SER-ACNC: 6 UG/DL — SIGNIFICANT CHANGE UP (ref 4.6–12)
TIBC SERPL-MCNC: 307 UG/DL — SIGNIFICANT CHANGE UP (ref 220–430)
TRIGL SERPL-MCNC: 81 MG/DL — SIGNIFICANT CHANGE UP
TRIPLE PHOSPHATE CRYSTALS: ABNORMAL
TROPONIN I, HIGH SENSITIVITY RESULT: 9.03 NG/L — SIGNIFICANT CHANGE UP
TSH SERPL-MCNC: 0.3 UU/ML — LOW (ref 0.34–4.82)
TSH SERPL-MCNC: 1.25 UU/ML — SIGNIFICANT CHANGE UP (ref 0.34–4.82)
UIBC SERPL-MCNC: 263 UG/DL — SIGNIFICANT CHANGE UP (ref 110–370)
URINE CYTOLOGY: NORMAL
UROBILINOGEN FLD QL: 0.2 MG/DL — SIGNIFICANT CHANGE UP (ref 0.2–1)
UROBILINOGEN FLD QL: NEGATIVE — SIGNIFICANT CHANGE UP
UROBILINOGEN FLD QL: NEGATIVE — SIGNIFICANT CHANGE UP
UROBILINOGEN URINE: NORMAL
VANCOMYCIN TROUGH SERPL-MCNC: 11.5 UG/ML — SIGNIFICANT CHANGE UP (ref 10–20)
VANCOMYCIN TROUGH SERPL-MCNC: 20.8 UG/ML — HIGH (ref 10–20)
VIT B12 SERPL-MCNC: 696 PG/ML — SIGNIFICANT CHANGE UP (ref 232–1245)
WBC # BLD: 3.97 K/UL — SIGNIFICANT CHANGE UP (ref 3.8–10.5)
WBC # BLD: 4.87 K/UL — SIGNIFICANT CHANGE UP (ref 3.8–10.5)
WBC # BLD: 5.03 K/UL — SIGNIFICANT CHANGE UP (ref 3.8–10.5)
WBC # BLD: 5.71 K/UL — SIGNIFICANT CHANGE UP (ref 3.8–10.5)
WBC # BLD: 5.9 K/UL — SIGNIFICANT CHANGE UP (ref 3.8–10.5)
WBC # BLD: 5.96 K/UL — SIGNIFICANT CHANGE UP (ref 3.8–10.5)
WBC # BLD: 7.27 K/UL — SIGNIFICANT CHANGE UP (ref 3.8–10.5)
WBC # BLD: 9.03 K/UL — SIGNIFICANT CHANGE UP (ref 3.8–10.5)
WBC # BLD: 9.71 K/UL — SIGNIFICANT CHANGE UP (ref 3.8–10.5)
WBC # FLD AUTO: 3.97 K/UL — SIGNIFICANT CHANGE UP (ref 3.8–10.5)
WBC # FLD AUTO: 4.87 K/UL — SIGNIFICANT CHANGE UP (ref 3.8–10.5)
WBC # FLD AUTO: 5.03 K/UL — SIGNIFICANT CHANGE UP (ref 3.8–10.5)
WBC # FLD AUTO: 5.71 K/UL — SIGNIFICANT CHANGE UP (ref 3.8–10.5)
WBC # FLD AUTO: 5.9 K/UL — SIGNIFICANT CHANGE UP (ref 3.8–10.5)
WBC # FLD AUTO: 5.96 K/UL — SIGNIFICANT CHANGE UP (ref 3.8–10.5)
WBC # FLD AUTO: 7.27 K/UL — SIGNIFICANT CHANGE UP (ref 3.8–10.5)
WBC # FLD AUTO: 9.03 K/UL — SIGNIFICANT CHANGE UP (ref 3.8–10.5)
WBC # FLD AUTO: 9.71 K/UL — SIGNIFICANT CHANGE UP (ref 3.8–10.5)
WBC UR QL: ABNORMAL /HPF (ref 0–5)
WBC UR QL: SIGNIFICANT CHANGE UP /HPF (ref 0–5)
WHITE BLOOD CELLS URINE: 5 /HPF

## 2023-01-01 PROCEDURE — 99284 EMERGENCY DEPT VISIT MOD MDM: CPT | Mod: 25

## 2023-01-01 PROCEDURE — 93970 EXTREMITY STUDY: CPT

## 2023-01-01 PROCEDURE — 83880 ASSAY OF NATRIURETIC PEPTIDE: CPT

## 2023-01-01 PROCEDURE — 81001 URINALYSIS AUTO W/SCOPE: CPT

## 2023-01-01 PROCEDURE — 84484 ASSAY OF TROPONIN QUANT: CPT

## 2023-01-01 PROCEDURE — 83036 HEMOGLOBIN GLYCOSYLATED A1C: CPT

## 2023-01-01 PROCEDURE — 51702 INSERT TEMP BLADDER CATH: CPT

## 2023-01-01 PROCEDURE — 93971 EXTREMITY STUDY: CPT | Mod: 26,LT

## 2023-01-01 PROCEDURE — 87899 AGENT NOS ASSAY W/OPTIC: CPT

## 2023-01-01 PROCEDURE — 70450 CT HEAD/BRAIN W/O DYE: CPT | Mod: 26,MA

## 2023-01-01 PROCEDURE — 82607 VITAMIN B-12: CPT

## 2023-01-01 PROCEDURE — 85610 PROTHROMBIN TIME: CPT

## 2023-01-01 PROCEDURE — 80053 COMPREHEN METABOLIC PANEL: CPT

## 2023-01-01 PROCEDURE — 99282 EMERGENCY DEPT VISIT SF MDM: CPT

## 2023-01-01 PROCEDURE — 84443 ASSAY THYROID STIM HORMONE: CPT

## 2023-01-01 PROCEDURE — 93306 TTE W/DOPPLER COMPLETE: CPT

## 2023-01-01 PROCEDURE — 85730 THROMBOPLASTIN TIME PARTIAL: CPT

## 2023-01-01 PROCEDURE — 87641 MR-STAPH DNA AMP PROBE: CPT

## 2023-01-01 PROCEDURE — 85025 COMPLETE CBC W/AUTO DIFF WBC: CPT

## 2023-01-01 PROCEDURE — 82306 VITAMIN D 25 HYDROXY: CPT

## 2023-01-01 PROCEDURE — 85045 AUTOMATED RETICULOCYTE COUNT: CPT

## 2023-01-01 PROCEDURE — 99213 OFFICE O/P EST LOW 20 MIN: CPT | Mod: 25

## 2023-01-01 PROCEDURE — 36415 COLL VENOUS BLD VENIPUNCTURE: CPT

## 2023-01-01 PROCEDURE — 99284 EMERGENCY DEPT VISIT MOD MDM: CPT

## 2023-01-01 PROCEDURE — 99215 OFFICE O/P EST HI 40 MIN: CPT

## 2023-01-01 PROCEDURE — 73590 X-RAY EXAM OF LOWER LEG: CPT | Mod: 26,LT

## 2023-01-01 PROCEDURE — 93010 ELECTROCARDIOGRAM REPORT: CPT

## 2023-01-01 PROCEDURE — 99285 EMERGENCY DEPT VISIT HI MDM: CPT

## 2023-01-01 PROCEDURE — 94640 AIRWAY INHALATION TREATMENT: CPT

## 2023-01-01 PROCEDURE — 86140 C-REACTIVE PROTEIN: CPT

## 2023-01-01 PROCEDURE — 93925 LOWER EXTREMITY STUDY: CPT | Mod: 26

## 2023-01-01 PROCEDURE — 99223 1ST HOSP IP/OBS HIGH 75: CPT

## 2023-01-01 PROCEDURE — 82140 ASSAY OF AMMONIA: CPT

## 2023-01-01 PROCEDURE — 84100 ASSAY OF PHOSPHORUS: CPT

## 2023-01-01 PROCEDURE — 83550 IRON BINDING TEST: CPT

## 2023-01-01 PROCEDURE — 93306 TTE W/DOPPLER COMPLETE: CPT | Mod: 26

## 2023-01-01 PROCEDURE — 82803 BLOOD GASES ANY COMBINATION: CPT

## 2023-01-01 PROCEDURE — 87086 URINE CULTURE/COLONY COUNT: CPT

## 2023-01-01 PROCEDURE — A4216: CPT | Mod: NC

## 2023-01-01 PROCEDURE — 93005 ELECTROCARDIOGRAM TRACING: CPT | Mod: XU

## 2023-01-01 PROCEDURE — 94003 VENT MGMT INPAT SUBQ DAY: CPT

## 2023-01-01 PROCEDURE — 87449 NOS EACH ORGANISM AG IA: CPT

## 2023-01-01 PROCEDURE — 80048 BASIC METABOLIC PNL TOTAL CA: CPT

## 2023-01-01 PROCEDURE — 99232 SBSQ HOSP IP/OBS MODERATE 35: CPT

## 2023-01-01 PROCEDURE — 94002 VENT MGMT INPAT INIT DAY: CPT

## 2023-01-01 PROCEDURE — 93005 ELECTROCARDIOGRAM TRACING: CPT

## 2023-01-01 PROCEDURE — 87077 CULTURE AEROBIC IDENTIFY: CPT

## 2023-01-01 PROCEDURE — 76705 ECHO EXAM OF ABDOMEN: CPT | Mod: 26

## 2023-01-01 PROCEDURE — 71275 CT ANGIOGRAPHY CHEST: CPT

## 2023-01-01 PROCEDURE — 12345: CPT | Mod: NC

## 2023-01-01 PROCEDURE — 96374 THER/PROPH/DIAG INJ IV PUSH: CPT | Mod: XU

## 2023-01-01 PROCEDURE — 83735 ASSAY OF MAGNESIUM: CPT

## 2023-01-01 PROCEDURE — 82550 ASSAY OF CK (CPK): CPT

## 2023-01-01 PROCEDURE — 99285 EMERGENCY DEPT VISIT HI MDM: CPT | Mod: GC

## 2023-01-01 PROCEDURE — 99283 EMERGENCY DEPT VISIT LOW MDM: CPT

## 2023-01-01 PROCEDURE — 71275 CT ANGIOGRAPHY CHEST: CPT | Mod: 26

## 2023-01-01 PROCEDURE — 71045 X-RAY EXAM CHEST 1 VIEW: CPT | Mod: 26

## 2023-01-01 PROCEDURE — 51703 INSERT BLADDER CATH COMPLEX: CPT

## 2023-01-01 PROCEDURE — 80061 LIPID PANEL: CPT

## 2023-01-01 PROCEDURE — 99222 1ST HOSP IP/OBS MODERATE 55: CPT

## 2023-01-01 PROCEDURE — 99283 EMERGENCY DEPT VISIT LOW MDM: CPT | Mod: FS

## 2023-01-01 PROCEDURE — 85027 COMPLETE CBC AUTOMATED: CPT

## 2023-01-01 PROCEDURE — 85652 RBC SED RATE AUTOMATED: CPT

## 2023-01-01 PROCEDURE — 80202 ASSAY OF VANCOMYCIN: CPT

## 2023-01-01 PROCEDURE — 99239 HOSP IP/OBS DSCHRG MGMT >30: CPT

## 2023-01-01 PROCEDURE — 99291 CRITICAL CARE FIRST HOUR: CPT | Mod: 25

## 2023-01-01 PROCEDURE — 51700 IRRIGATION OF BLADDER: CPT

## 2023-01-01 PROCEDURE — 93970 EXTREMITY STUDY: CPT | Mod: 26

## 2023-01-01 PROCEDURE — 31500 INSERT EMERGENCY AIRWAY: CPT

## 2023-01-01 PROCEDURE — 99291 CRITICAL CARE FIRST HOUR: CPT

## 2023-01-01 PROCEDURE — 99292 CRITICAL CARE ADDL 30 MIN: CPT | Mod: 25

## 2023-01-01 PROCEDURE — 36600 WITHDRAWAL OF ARTERIAL BLOOD: CPT

## 2023-01-01 PROCEDURE — 87070 CULTURE OTHR SPECIMN AEROBIC: CPT

## 2023-01-01 PROCEDURE — 74178 CT ABD&PLV WO CNTR FLWD CNTR: CPT

## 2023-01-01 PROCEDURE — 97116 GAIT TRAINING THERAPY: CPT | Mod: GP

## 2023-01-01 PROCEDURE — 71045 X-RAY EXAM CHEST 1 VIEW: CPT

## 2023-01-01 PROCEDURE — 83540 ASSAY OF IRON: CPT

## 2023-01-01 PROCEDURE — C9113: CPT

## 2023-01-01 PROCEDURE — 99292 CRITICAL CARE ADDL 30 MIN: CPT

## 2023-01-01 PROCEDURE — 87640 STAPH A DNA AMP PROBE: CPT

## 2023-01-01 PROCEDURE — 82728 ASSAY OF FERRITIN: CPT

## 2023-01-01 PROCEDURE — 74178 CT ABD&PLV WO CNTR FLWD CNTR: CPT | Mod: 26,MH

## 2023-01-01 PROCEDURE — 83605 ASSAY OF LACTIC ACID: CPT

## 2023-01-01 PROCEDURE — 82746 ASSAY OF FOLIC ACID SERUM: CPT

## 2023-01-01 PROCEDURE — 99283 EMERGENCY DEPT VISIT LOW MDM: CPT | Mod: 25

## 2023-01-01 PROCEDURE — 87186 SC STD MICRODIL/AGAR DIL: CPT

## 2023-01-01 PROCEDURE — 93925 LOWER EXTREMITY STUDY: CPT

## 2023-01-01 RX ORDER — CEFUROXIME AXETIL 250 MG
500 TABLET ORAL EVERY 12 HOURS
Refills: 0 | Status: DISCONTINUED | OUTPATIENT
Start: 2023-01-01 | End: 2023-01-01

## 2023-01-01 RX ORDER — OXYBUTYNIN CHLORIDE 5 MG
1 TABLET ORAL
Refills: 0 | DISCHARGE

## 2023-01-01 RX ORDER — AZITHROMYCIN 500 MG/1
500 TABLET, FILM COATED ORAL EVERY 24 HOURS
Refills: 0 | Status: DISCONTINUED | OUTPATIENT
Start: 2023-01-01 | End: 2023-01-01

## 2023-01-01 RX ORDER — TAMSULOSIN HYDROCHLORIDE 0.4 MG/1
0.4 CAPSULE ORAL AT BEDTIME
Refills: 0 | Status: DISCONTINUED | OUTPATIENT
Start: 2023-01-01 | End: 2023-01-01

## 2023-01-01 RX ORDER — CHOLECALCIFEROL (VITAMIN D3) 125 MCG
1 CAPSULE ORAL
Refills: 0 | DISCHARGE

## 2023-01-01 RX ORDER — ALBUTEROL 90 UG/1
2 AEROSOL, METERED ORAL EVERY 6 HOURS
Refills: 0 | Status: DISCONTINUED | OUTPATIENT
Start: 2023-01-01 | End: 2023-01-01

## 2023-01-01 RX ORDER — CHLORHEXIDINE GLUCONATE 213 G/1000ML
15 SOLUTION TOPICAL EVERY 12 HOURS
Refills: 0 | Status: DISCONTINUED | OUTPATIENT
Start: 2023-01-01 | End: 2023-01-01

## 2023-01-01 RX ORDER — THIAMINE MONONITRATE (VIT B1) 100 MG
100 TABLET ORAL DAILY
Refills: 0 | Status: DISCONTINUED | OUTPATIENT
Start: 2023-01-01 | End: 2023-01-01

## 2023-01-01 RX ORDER — PHENYLEPHRINE HYDROCHLORIDE 10 MG/ML
1 INJECTION INTRAVENOUS
Qty: 40 | Refills: 0 | Status: DISCONTINUED | OUTPATIENT
Start: 2023-01-01 | End: 2023-01-01

## 2023-01-01 RX ORDER — AMPICILLIN SODIUM AND SULBACTAM SODIUM 250; 125 MG/ML; MG/ML
3 INJECTION, POWDER, FOR SUSPENSION INTRAMUSCULAR; INTRAVENOUS ONCE
Refills: 0 | Status: COMPLETED | OUTPATIENT
Start: 2023-01-01 | End: 2023-01-01

## 2023-01-01 RX ORDER — CHOLECALCIFEROL (VITAMIN D3) 125 MCG
1 CAPSULE ORAL
Qty: 30 | Refills: 0
Start: 2023-01-01 | End: 2023-01-01

## 2023-01-01 RX ORDER — CEFUROXIME AXETIL 250 MG
1 TABLET ORAL
Qty: 0 | Refills: 0 | DISCHARGE
Start: 2023-01-01

## 2023-01-01 RX ORDER — MEROPENEM 1 G/30ML
1000 INJECTION INTRAVENOUS EVERY 8 HOURS
Refills: 0 | Status: COMPLETED | OUTPATIENT
Start: 2023-01-01 | End: 2023-01-01

## 2023-01-01 RX ORDER — VANCOMYCIN HCL 1 G
1000 VIAL (EA) INTRAVENOUS EVERY 12 HOURS
Refills: 0 | Status: DISCONTINUED | OUTPATIENT
Start: 2023-01-01 | End: 2023-01-01

## 2023-01-01 RX ORDER — PANTOPRAZOLE SODIUM 20 MG/1
40 TABLET, DELAYED RELEASE ORAL DAILY
Refills: 0 | Status: DISCONTINUED | OUTPATIENT
Start: 2023-01-01 | End: 2023-01-01

## 2023-01-01 RX ORDER — METOPROLOL TARTRATE 50 MG
25 TABLET ORAL
Refills: 0 | Status: DISCONTINUED | OUTPATIENT
Start: 2023-01-01 | End: 2023-01-01

## 2023-01-01 RX ORDER — CEFEPIME 1 G/1
2000 INJECTION, POWDER, FOR SOLUTION INTRAMUSCULAR; INTRAVENOUS EVERY 12 HOURS
Refills: 0 | Status: DISCONTINUED | OUTPATIENT
Start: 2023-01-01 | End: 2023-01-01

## 2023-01-01 RX ORDER — SODIUM CHLORIDE 9 MG/ML
1000 INJECTION INTRAMUSCULAR; INTRAVENOUS; SUBCUTANEOUS ONCE
Refills: 0 | Status: DISCONTINUED | OUTPATIENT
Start: 2023-01-01 | End: 2023-01-01

## 2023-01-01 RX ORDER — LANOLIN ALCOHOL/MO/W.PET/CERES
3 CREAM (GRAM) TOPICAL AT BEDTIME
Refills: 0 | Status: DISCONTINUED | OUTPATIENT
Start: 2023-01-01 | End: 2023-01-01

## 2023-01-01 RX ORDER — METOPROLOL TARTRATE 50 MG
5 TABLET ORAL ONCE
Refills: 0 | Status: COMPLETED | OUTPATIENT
Start: 2023-01-01 | End: 2023-01-01

## 2023-01-01 RX ORDER — VANCOMYCIN HCL 1 G
1250 VIAL (EA) INTRAVENOUS EVERY 12 HOURS
Refills: 0 | Status: DISCONTINUED | OUTPATIENT
Start: 2023-01-01 | End: 2023-01-01

## 2023-01-01 RX ORDER — ACETAMINOPHEN 500 MG
1000 TABLET ORAL ONCE
Refills: 0 | Status: COMPLETED | OUTPATIENT
Start: 2023-01-01 | End: 2023-01-01

## 2023-01-01 RX ORDER — METOPROLOL TARTRATE 50 MG
1 TABLET ORAL
Qty: 0 | Refills: 0 | DISCHARGE

## 2023-01-01 RX ORDER — FUROSEMIDE 40 MG
40 TABLET ORAL DAILY
Refills: 0 | Status: DISCONTINUED | OUTPATIENT
Start: 2023-01-01 | End: 2023-01-01

## 2023-01-01 RX ORDER — ROCURONIUM BROMIDE 10 MG/ML
40 VIAL (ML) INTRAVENOUS ONCE
Refills: 0 | Status: DISCONTINUED | OUTPATIENT
Start: 2023-01-01 | End: 2023-01-01

## 2023-01-01 RX ORDER — CEFDINIR 250 MG/5ML
1 POWDER, FOR SUSPENSION ORAL
Qty: 14 | Refills: 0
Start: 2023-01-01 | End: 2023-01-01

## 2023-01-01 RX ORDER — MAGNESIUM OXIDE 400 MG ORAL TABLET 241.3 MG
1 TABLET ORAL
Refills: 0 | DISCHARGE

## 2023-01-01 RX ORDER — SODIUM CHLORIDE 9 MG/ML
1000 INJECTION INTRAMUSCULAR; INTRAVENOUS; SUBCUTANEOUS
Refills: 0 | Status: DISCONTINUED | OUTPATIENT
Start: 2023-01-01 | End: 2023-01-01

## 2023-01-01 RX ORDER — MEROPENEM 1 G/30ML
INJECTION INTRAVENOUS
Refills: 0 | Status: COMPLETED | OUTPATIENT
Start: 2023-01-01 | End: 2023-01-01

## 2023-01-01 RX ORDER — METOPROLOL TARTRATE 50 MG
2.5 TABLET ORAL EVERY 6 HOURS
Refills: 0 | Status: DISCONTINUED | OUTPATIENT
Start: 2023-01-01 | End: 2023-01-01

## 2023-01-01 RX ORDER — ALBUTEROL 90 UG/1
2.5 AEROSOL, METERED ORAL ONCE
Refills: 0 | Status: COMPLETED | OUTPATIENT
Start: 2023-01-01 | End: 2023-01-01

## 2023-01-01 RX ORDER — MEROPENEM 1 G/30ML
1000 INJECTION INTRAVENOUS ONCE
Refills: 0 | Status: COMPLETED | OUTPATIENT
Start: 2023-01-01 | End: 2023-01-01

## 2023-01-01 RX ORDER — SODIUM CHLORIDE 9 MG/ML
1000 INJECTION INTRAMUSCULAR; INTRAVENOUS; SUBCUTANEOUS ONCE
Refills: 0 | Status: COMPLETED | OUTPATIENT
Start: 2023-01-01 | End: 2023-01-01

## 2023-01-01 RX ORDER — AZITHROMYCIN 500 MG/1
TABLET, FILM COATED ORAL
Refills: 0 | Status: DISCONTINUED | OUTPATIENT
Start: 2023-01-01 | End: 2023-01-01

## 2023-01-01 RX ORDER — VANCOMYCIN HCL 1 G
1000 VIAL (EA) INTRAVENOUS ONCE
Refills: 0 | Status: DISCONTINUED | OUTPATIENT
Start: 2023-01-01 | End: 2023-01-01

## 2023-01-01 RX ORDER — THIAMINE MONONITRATE (VIT B1) 100 MG
100 TABLET ORAL ONCE
Refills: 0 | Status: COMPLETED | OUTPATIENT
Start: 2023-01-01 | End: 2023-01-01

## 2023-01-01 RX ORDER — POTASSIUM CHLORIDE 20 MEQ
20 PACKET (EA) ORAL DAILY
Refills: 0 | Status: DISCONTINUED | OUTPATIENT
Start: 2023-01-01 | End: 2023-01-01

## 2023-01-01 RX ORDER — PANTOPRAZOLE SODIUM 20 MG/1
40 TABLET, DELAYED RELEASE ORAL ONCE
Refills: 0 | Status: COMPLETED | OUTPATIENT
Start: 2023-01-01 | End: 2023-01-01

## 2023-01-01 RX ORDER — MAGNESIUM SULFATE 500 MG/ML
2 VIAL (ML) INJECTION ONCE
Refills: 0 | Status: COMPLETED | OUTPATIENT
Start: 2023-01-01 | End: 2023-01-01

## 2023-01-01 RX ORDER — APIXABAN 2.5 MG/1
1 TABLET, FILM COATED ORAL
Qty: 0 | Refills: 0 | DISCHARGE

## 2023-01-01 RX ORDER — CEFEPIME 1 G/1
1000 INJECTION, POWDER, FOR SOLUTION INTRAMUSCULAR; INTRAVENOUS EVERY 12 HOURS
Refills: 0 | Status: DISCONTINUED | OUTPATIENT
Start: 2023-01-01 | End: 2023-01-01

## 2023-01-01 RX ORDER — FUROSEMIDE 40 MG
1 TABLET ORAL
Refills: 0 | DISCHARGE

## 2023-01-01 RX ORDER — BETHANECHOL CHLORIDE 50 MG/1
50 TABLET ORAL
Qty: 60 | Refills: 2 | Status: ACTIVE | COMMUNITY
Start: 2021-05-04 | End: 1900-01-01

## 2023-01-01 RX ORDER — ACETAMINOPHEN 500 MG
650 TABLET ORAL EVERY 6 HOURS
Refills: 0 | Status: DISCONTINUED | OUTPATIENT
Start: 2023-01-01 | End: 2023-01-01

## 2023-01-01 RX ORDER — SULFAMETHOXAZOLE AND TRIMETHOPRIM 800; 160 MG/1; MG/1
800-160 TABLET ORAL TWICE DAILY
Qty: 14 | Refills: 0 | Status: ACTIVE | COMMUNITY
Start: 2022-10-07 | End: 1900-01-01

## 2023-01-01 RX ORDER — FOLIC ACID 0.8 MG
1 TABLET ORAL DAILY
Refills: 0 | Status: DISCONTINUED | OUTPATIENT
Start: 2023-01-01 | End: 2023-01-01

## 2023-01-01 RX ORDER — APIXABAN 2.5 MG/1
5 TABLET, FILM COATED ORAL EVERY 12 HOURS
Refills: 0 | Status: DISCONTINUED | OUTPATIENT
Start: 2023-01-01 | End: 2023-01-01

## 2023-01-01 RX ORDER — ESMOLOL HCL 100MG/10ML
50 VIAL (ML) INTRAVENOUS
Qty: 2500 | Refills: 0 | Status: DISCONTINUED | OUTPATIENT
Start: 2023-01-01 | End: 2023-01-01

## 2023-01-01 RX ORDER — PROPOFOL 10 MG/ML
20 INJECTION, EMULSION INTRAVENOUS
Qty: 1000 | Refills: 0 | Status: DISCONTINUED | OUTPATIENT
Start: 2023-01-01 | End: 2023-01-01

## 2023-01-01 RX ORDER — ONDANSETRON 8 MG/1
4 TABLET, FILM COATED ORAL EVERY 8 HOURS
Refills: 0 | Status: DISCONTINUED | OUTPATIENT
Start: 2023-01-01 | End: 2023-01-01

## 2023-01-01 RX ORDER — CEFTRIAXONE 500 MG/1
1000 INJECTION, POWDER, FOR SOLUTION INTRAMUSCULAR; INTRAVENOUS EVERY 24 HOURS
Refills: 0 | Status: DISCONTINUED | OUTPATIENT
Start: 2023-01-01 | End: 2023-01-01

## 2023-01-01 RX ORDER — PIPERACILLIN AND TAZOBACTAM 4; .5 G/20ML; G/20ML
3.38 INJECTION, POWDER, LYOPHILIZED, FOR SOLUTION INTRAVENOUS ONCE
Refills: 0 | Status: COMPLETED | OUTPATIENT
Start: 2023-01-01 | End: 2023-01-01

## 2023-01-01 RX ORDER — CEFUROXIME AXETIL 250 MG
1 TABLET ORAL
Qty: 10 | Refills: 0
Start: 2023-01-01 | End: 2023-01-01

## 2023-01-01 RX ORDER — SODIUM CHLORIDE 9 MG/ML
1000 INJECTION, SOLUTION INTRAVENOUS ONCE
Refills: 0 | Status: COMPLETED | OUTPATIENT
Start: 2023-01-01 | End: 2023-01-01

## 2023-01-01 RX ORDER — IPRATROPIUM/ALBUTEROL SULFATE 18-103MCG
3 AEROSOL WITH ADAPTER (GRAM) INHALATION EVERY 4 HOURS
Refills: 0 | Status: DISCONTINUED | OUTPATIENT
Start: 2023-01-01 | End: 2023-01-01

## 2023-01-01 RX ORDER — OXYCODONE HYDROCHLORIDE 5 MG/1
5 TABLET ORAL EVERY 6 HOURS
Refills: 0 | Status: DISCONTINUED | OUTPATIENT
Start: 2023-01-01 | End: 2023-01-01

## 2023-01-01 RX ORDER — METOPROLOL TARTRATE 50 MG
100 TABLET ORAL DAILY
Refills: 0 | Status: DISCONTINUED | OUTPATIENT
Start: 2023-01-01 | End: 2023-01-01

## 2023-01-01 RX ORDER — PIPERACILLIN AND TAZOBACTAM 4; .5 G/20ML; G/20ML
3.38 INJECTION, POWDER, LYOPHILIZED, FOR SOLUTION INTRAVENOUS EVERY 8 HOURS
Refills: 0 | Status: DISCONTINUED | OUTPATIENT
Start: 2023-01-01 | End: 2023-01-01

## 2023-01-01 RX ORDER — CEFUROXIME AXETIL 250 MG
250 TABLET ORAL EVERY 12 HOURS
Refills: 0 | Status: DISCONTINUED | OUTPATIENT
Start: 2023-01-01 | End: 2023-01-01

## 2023-01-01 RX ORDER — AZITHROMYCIN 500 MG/1
500 TABLET, FILM COATED ORAL ONCE
Refills: 0 | Status: COMPLETED | OUTPATIENT
Start: 2023-01-01 | End: 2023-01-01

## 2023-01-01 RX ORDER — POTASSIUM CHLORIDE 20 MEQ
1 PACKET (EA) ORAL
Refills: 0 | DISCHARGE

## 2023-01-01 RX ORDER — CISATRACURIUM BESYLATE 2 MG/ML
3 INJECTION INTRAVENOUS
Qty: 200 | Refills: 0 | Status: DISCONTINUED | OUTPATIENT
Start: 2023-01-01 | End: 2023-01-01

## 2023-01-01 RX ORDER — IPRATROPIUM/ALBUTEROL SULFATE 18-103MCG
3 AEROSOL WITH ADAPTER (GRAM) INHALATION EVERY 6 HOURS
Refills: 0 | Status: DISCONTINUED | OUTPATIENT
Start: 2023-01-01 | End: 2023-01-01

## 2023-01-01 RX ORDER — MAGNESIUM OXIDE 400 MG ORAL TABLET 241.3 MG
400 TABLET ORAL DAILY
Refills: 0 | Status: DISCONTINUED | OUTPATIENT
Start: 2023-01-01 | End: 2023-01-01

## 2023-01-01 RX ORDER — VANCOMYCIN HCL 1 G
1250 VIAL (EA) INTRAVENOUS ONCE
Refills: 0 | Status: COMPLETED | OUTPATIENT
Start: 2023-01-01 | End: 2023-01-01

## 2023-01-01 RX ORDER — MORPHINE SULFATE 50 MG/1
2 CAPSULE, EXTENDED RELEASE ORAL ONCE
Refills: 0 | Status: DISCONTINUED | OUTPATIENT
Start: 2023-01-01 | End: 2023-01-01

## 2023-01-01 RX ORDER — CEFTRIAXONE 500 MG/1
1000 INJECTION, POWDER, FOR SOLUTION INTRAMUSCULAR; INTRAVENOUS ONCE
Refills: 0 | Status: COMPLETED | OUTPATIENT
Start: 2023-01-01 | End: 2023-01-01

## 2023-01-01 RX ORDER — TAMSULOSIN HYDROCHLORIDE 0.4 MG/1
1 CAPSULE ORAL
Refills: 0 | DISCHARGE

## 2023-01-01 RX ORDER — VANCOMYCIN HCL 1 G
1000 VIAL (EA) INTRAVENOUS ONCE
Refills: 0 | Status: COMPLETED | OUTPATIENT
Start: 2023-01-01 | End: 2023-01-01

## 2023-01-01 RX ORDER — LEVALBUTEROL 1.25 MG/.5ML
1.25 SOLUTION, CONCENTRATE RESPIRATORY (INHALATION) EVERY 4 HOURS
Refills: 0 | Status: DISCONTINUED | OUTPATIENT
Start: 2023-01-01 | End: 2023-01-01

## 2023-01-01 RX ORDER — OXYBUTYNIN CHLORIDE 15 MG/1
15 TABLET, EXTENDED RELEASE ORAL
Qty: 90 | Refills: 3 | Status: ACTIVE | COMMUNITY
Start: 2021-10-20 | End: 1900-01-01

## 2023-01-01 RX ADMIN — Medication 20 MILLIEQUIVALENT(S): at 09:20

## 2023-01-01 RX ADMIN — Medication 3 MILLILITER(S): at 19:52

## 2023-01-01 RX ADMIN — APIXABAN 5 MILLIGRAM(S): 2.5 TABLET, FILM COATED ORAL at 22:05

## 2023-01-01 RX ADMIN — Medication 166.67 MILLIGRAM(S): at 23:35

## 2023-01-01 RX ADMIN — PIPERACILLIN AND TAZOBACTAM 200 GRAM(S): 4; .5 INJECTION, POWDER, LYOPHILIZED, FOR SOLUTION INTRAVENOUS at 13:01

## 2023-01-01 RX ADMIN — AMPICILLIN SODIUM AND SULBACTAM SODIUM 3 GRAM(S): 250; 125 INJECTION, POWDER, FOR SUSPENSION INTRAMUSCULAR; INTRAVENOUS at 22:48

## 2023-01-01 RX ADMIN — CEFEPIME 100 MILLIGRAM(S): 1 INJECTION, POWDER, FOR SOLUTION INTRAMUSCULAR; INTRAVENOUS at 04:33

## 2023-01-01 RX ADMIN — Medication 31.3 MICROGRAM(S)/KG/MIN: at 12:21

## 2023-01-01 RX ADMIN — Medication 2 MILLIGRAM(S): at 22:12

## 2023-01-01 RX ADMIN — Medication 100 MILLIGRAM(S): at 09:31

## 2023-01-01 RX ADMIN — CEFEPIME 1000 MILLIGRAM(S): 1 INJECTION, POWDER, FOR SOLUTION INTRAMUSCULAR; INTRAVENOUS at 09:13

## 2023-01-01 RX ADMIN — PHENYLEPHRINE HYDROCHLORIDE 39.2 MICROGRAM(S)/KG/MIN: 10 INJECTION INTRAVENOUS at 12:43

## 2023-01-01 RX ADMIN — Medication 100 MILLIGRAM(S): at 10:46

## 2023-01-01 RX ADMIN — Medication 100 MILLIGRAM(S): at 10:52

## 2023-01-01 RX ADMIN — APIXABAN 5 MILLIGRAM(S): 2.5 TABLET, FILM COATED ORAL at 09:19

## 2023-01-01 RX ADMIN — Medication 1000 MILLIGRAM(S): at 14:02

## 2023-01-01 RX ADMIN — Medication 25 GRAM(S): at 01:23

## 2023-01-01 RX ADMIN — Medication 20 MILLIEQUIVALENT(S): at 10:47

## 2023-01-01 RX ADMIN — Medication 2.5 MILLIGRAM(S): at 18:00

## 2023-01-01 RX ADMIN — SODIUM CHLORIDE 2000 MILLILITER(S): 9 INJECTION, SOLUTION INTRAVENOUS at 00:00

## 2023-01-01 RX ADMIN — Medication 100 MILLIGRAM(S): at 09:29

## 2023-01-01 RX ADMIN — PANTOPRAZOLE SODIUM 40 MILLIGRAM(S): 20 TABLET, DELAYED RELEASE ORAL at 00:08

## 2023-01-01 RX ADMIN — CEFEPIME 100 MILLIGRAM(S): 1 INJECTION, POWDER, FOR SOLUTION INTRAMUSCULAR; INTRAVENOUS at 22:05

## 2023-01-01 RX ADMIN — Medication 166.67 MILLIGRAM(S): at 12:40

## 2023-01-01 RX ADMIN — Medication 1 APPLICATION(S): at 20:45

## 2023-01-01 RX ADMIN — MAGNESIUM OXIDE 400 MG ORAL TABLET 400 MILLIGRAM(S): 241.3 TABLET ORAL at 10:24

## 2023-01-01 RX ADMIN — Medication 20 MILLIEQUIVALENT(S): at 10:23

## 2023-01-01 RX ADMIN — LEVALBUTEROL 1.25 MILLIGRAM(S): 1.25 SOLUTION, CONCENTRATE RESPIRATORY (INHALATION) at 01:56

## 2023-01-01 RX ADMIN — CHLORHEXIDINE GLUCONATE 15 MILLILITER(S): 213 SOLUTION TOPICAL at 18:01

## 2023-01-01 RX ADMIN — CEFEPIME 2000 MILLIGRAM(S): 1 INJECTION, POWDER, FOR SOLUTION INTRAMUSCULAR; INTRAVENOUS at 10:53

## 2023-01-01 RX ADMIN — Medication 100 MILLIGRAM(S): at 09:30

## 2023-01-01 RX ADMIN — Medication 650 MILLIGRAM(S): at 20:10

## 2023-01-01 RX ADMIN — LEVALBUTEROL 1.25 MILLIGRAM(S): 1.25 SOLUTION, CONCENTRATE RESPIRATORY (INHALATION) at 11:38

## 2023-01-01 RX ADMIN — Medication 25 MILLIGRAM(S): at 21:10

## 2023-01-01 RX ADMIN — Medication 1 MILLIGRAM(S): at 10:26

## 2023-01-01 RX ADMIN — Medication 5 MILLIGRAM(S): at 11:35

## 2023-01-01 RX ADMIN — Medication 2 MILLIGRAM(S): at 22:52

## 2023-01-01 RX ADMIN — MAGNESIUM OXIDE 400 MG ORAL TABLET 400 MILLIGRAM(S): 241.3 TABLET ORAL at 09:20

## 2023-01-01 RX ADMIN — LEVALBUTEROL 1.25 MILLIGRAM(S): 1.25 SOLUTION, CONCENTRATE RESPIRATORY (INHALATION) at 12:53

## 2023-01-01 RX ADMIN — CEFEPIME 100 MILLIGRAM(S): 1 INJECTION, POWDER, FOR SOLUTION INTRAMUSCULAR; INTRAVENOUS at 17:52

## 2023-01-01 RX ADMIN — CEFEPIME 100 MILLIGRAM(S): 1 INJECTION, POWDER, FOR SOLUTION INTRAMUSCULAR; INTRAVENOUS at 04:57

## 2023-01-01 RX ADMIN — CEFEPIME 100 MILLIGRAM(S): 1 INJECTION, POWDER, FOR SOLUTION INTRAMUSCULAR; INTRAVENOUS at 18:39

## 2023-01-01 RX ADMIN — Medication 100 MILLIGRAM(S): at 09:52

## 2023-01-01 RX ADMIN — Medication 500 MILLIGRAM(S): at 13:45

## 2023-01-01 RX ADMIN — LEVALBUTEROL 1.25 MILLIGRAM(S): 1.25 SOLUTION, CONCENTRATE RESPIRATORY (INHALATION) at 20:46

## 2023-01-01 RX ADMIN — CEFEPIME 2000 MILLIGRAM(S): 1 INJECTION, POWDER, FOR SOLUTION INTRAMUSCULAR; INTRAVENOUS at 00:24

## 2023-01-01 RX ADMIN — APIXABAN 5 MILLIGRAM(S): 2.5 TABLET, FILM COATED ORAL at 23:18

## 2023-01-01 RX ADMIN — APIXABAN 5 MILLIGRAM(S): 2.5 TABLET, FILM COATED ORAL at 10:24

## 2023-01-01 RX ADMIN — SODIUM CHLORIDE 80 MILLILITER(S): 9 INJECTION INTRAMUSCULAR; INTRAVENOUS; SUBCUTANEOUS at 15:51

## 2023-01-01 RX ADMIN — CEFEPIME 100 MILLIGRAM(S): 1 INJECTION, POWDER, FOR SOLUTION INTRAMUSCULAR; INTRAVENOUS at 05:32

## 2023-01-01 RX ADMIN — Medication 1 APPLICATION(S): at 05:05

## 2023-01-01 RX ADMIN — MAGNESIUM OXIDE 400 MG ORAL TABLET 400 MILLIGRAM(S): 241.3 TABLET ORAL at 12:40

## 2023-01-01 RX ADMIN — MORPHINE SULFATE 2 MILLIGRAM(S): 50 CAPSULE, EXTENDED RELEASE ORAL at 23:00

## 2023-01-01 RX ADMIN — Medication 400 MILLIGRAM(S): at 01:16

## 2023-01-01 RX ADMIN — ALBUTEROL 2.5 MILLIGRAM(S): 90 AEROSOL, METERED ORAL at 22:31

## 2023-01-01 RX ADMIN — TAMSULOSIN HYDROCHLORIDE 0.4 MILLIGRAM(S): 0.4 CAPSULE ORAL at 22:05

## 2023-01-01 RX ADMIN — PROPOFOL 12.5 MICROGRAM(S)/KG/MIN: 10 INJECTION, EMULSION INTRAVENOUS at 18:25

## 2023-01-01 RX ADMIN — Medication 40 MILLIGRAM(S): at 01:05

## 2023-01-01 RX ADMIN — PROPOFOL 12.5 MICROGRAM(S)/KG/MIN: 10 INJECTION, EMULSION INTRAVENOUS at 18:22

## 2023-01-01 RX ADMIN — CEFEPIME 100 MILLIGRAM(S): 1 INJECTION, POWDER, FOR SOLUTION INTRAMUSCULAR; INTRAVENOUS at 17:39

## 2023-01-01 RX ADMIN — CEFEPIME 2000 MILLIGRAM(S): 1 INJECTION, POWDER, FOR SOLUTION INTRAMUSCULAR; INTRAVENOUS at 12:02

## 2023-01-01 RX ADMIN — MAGNESIUM OXIDE 400 MG ORAL TABLET 400 MILLIGRAM(S): 241.3 TABLET ORAL at 10:45

## 2023-01-01 RX ADMIN — Medication 24.5 MICROGRAM(S)/KG/MIN: at 00:41

## 2023-01-01 RX ADMIN — CEFEPIME 100 MILLIGRAM(S): 1 INJECTION, POWDER, FOR SOLUTION INTRAMUSCULAR; INTRAVENOUS at 17:22

## 2023-01-01 RX ADMIN — CEFTRIAXONE 1000 MILLIGRAM(S): 500 INJECTION, POWDER, FOR SOLUTION INTRAMUSCULAR; INTRAVENOUS at 12:39

## 2023-01-01 RX ADMIN — TAMSULOSIN HYDROCHLORIDE 0.4 MILLIGRAM(S): 0.4 CAPSULE ORAL at 21:41

## 2023-01-01 RX ADMIN — LEVALBUTEROL 1.25 MILLIGRAM(S): 1.25 SOLUTION, CONCENTRATE RESPIRATORY (INHALATION) at 16:19

## 2023-01-01 RX ADMIN — Medication 2 MILLIGRAM(S): at 10:52

## 2023-01-01 RX ADMIN — SODIUM CHLORIDE 1000 MILLILITER(S): 9 INJECTION INTRAMUSCULAR; INTRAVENOUS; SUBCUTANEOUS at 12:40

## 2023-01-01 RX ADMIN — TAMSULOSIN HYDROCHLORIDE 0.4 MILLIGRAM(S): 0.4 CAPSULE ORAL at 23:44

## 2023-01-01 RX ADMIN — Medication 250 MILLIGRAM(S): at 09:13

## 2023-01-01 RX ADMIN — Medication 400 MILLIGRAM(S): at 00:26

## 2023-01-01 RX ADMIN — MAGNESIUM OXIDE 400 MG ORAL TABLET 400 MILLIGRAM(S): 241.3 TABLET ORAL at 09:13

## 2023-01-01 RX ADMIN — Medication 20 MILLIEQUIVALENT(S): at 12:39

## 2023-01-01 RX ADMIN — Medication 166.67 MILLIGRAM(S): at 21:00

## 2023-01-01 RX ADMIN — Medication 40 MILLIGRAM(S): at 01:20

## 2023-01-01 RX ADMIN — PIPERACILLIN AND TAZOBACTAM 3.38 GRAM(S): 4; .5 INJECTION, POWDER, LYOPHILIZED, FOR SOLUTION INTRAVENOUS at 14:02

## 2023-01-01 RX ADMIN — Medication 250 MILLIGRAM(S): at 21:37

## 2023-01-01 RX ADMIN — Medication 100 MILLIGRAM(S): at 21:10

## 2023-01-01 RX ADMIN — Medication 5 MILLIGRAM(S): at 00:22

## 2023-01-01 RX ADMIN — CEFEPIME 100 MILLIGRAM(S): 1 INJECTION, POWDER, FOR SOLUTION INTRAMUSCULAR; INTRAVENOUS at 10:47

## 2023-01-01 RX ADMIN — Medication 100 MILLIGRAM(S): at 09:51

## 2023-01-01 RX ADMIN — APIXABAN 5 MILLIGRAM(S): 2.5 TABLET, FILM COATED ORAL at 12:40

## 2023-01-01 RX ADMIN — Medication 5 MILLIGRAM(S): at 22:12

## 2023-01-01 RX ADMIN — Medication 25 MILLIGRAM(S): at 20:45

## 2023-01-01 RX ADMIN — LEVALBUTEROL 1.25 MILLIGRAM(S): 1.25 SOLUTION, CONCENTRATE RESPIRATORY (INHALATION) at 23:22

## 2023-01-01 RX ADMIN — CHLORHEXIDINE GLUCONATE 15 MILLILITER(S): 213 SOLUTION TOPICAL at 05:57

## 2023-01-01 RX ADMIN — Medication 166.67 MILLIGRAM(S): at 21:42

## 2023-01-01 RX ADMIN — OXYCODONE HYDROCHLORIDE 5 MILLIGRAM(S): 5 TABLET ORAL at 07:00

## 2023-01-01 RX ADMIN — APIXABAN 5 MILLIGRAM(S): 2.5 TABLET, FILM COATED ORAL at 23:44

## 2023-01-01 RX ADMIN — AMPICILLIN SODIUM AND SULBACTAM SODIUM 200 GRAM(S): 250; 125 INJECTION, POWDER, FOR SUSPENSION INTRAMUSCULAR; INTRAVENOUS at 20:57

## 2023-01-01 RX ADMIN — Medication 40 MILLIGRAM(S): at 10:46

## 2023-01-01 RX ADMIN — Medication 650 MILLIGRAM(S): at 11:54

## 2023-01-01 RX ADMIN — APIXABAN 5 MILLIGRAM(S): 2.5 TABLET, FILM COATED ORAL at 23:25

## 2023-01-01 RX ADMIN — Medication 166.67 MILLIGRAM(S): at 23:45

## 2023-01-01 RX ADMIN — APIXABAN 5 MILLIGRAM(S): 2.5 TABLET, FILM COATED ORAL at 10:53

## 2023-01-01 RX ADMIN — Medication 100 MILLIGRAM(S): at 09:19

## 2023-01-01 RX ADMIN — Medication 3 MILLILITER(S): at 17:36

## 2023-01-01 RX ADMIN — Medication 650 MILLIGRAM(S): at 13:35

## 2023-01-01 RX ADMIN — Medication 1000 MILLIGRAM(S): at 22:48

## 2023-01-01 RX ADMIN — Medication 5 MILLIGRAM(S): at 00:07

## 2023-01-01 RX ADMIN — SODIUM CHLORIDE 1000 MILLILITER(S): 9 INJECTION INTRAMUSCULAR; INTRAVENOUS; SUBCUTANEOUS at 17:04

## 2023-01-01 RX ADMIN — Medication 650 MILLIGRAM(S): at 21:00

## 2023-01-01 RX ADMIN — AZITHROMYCIN 500 MILLIGRAM(S): 500 TABLET, FILM COATED ORAL at 01:05

## 2023-01-01 RX ADMIN — Medication 20 MILLIEQUIVALENT(S): at 09:13

## 2023-01-01 RX ADMIN — Medication 1000 MILLIGRAM(S): at 01:30

## 2023-01-01 RX ADMIN — Medication 40 MILLIGRAM(S): at 18:01

## 2023-01-01 RX ADMIN — Medication 80 MILLIGRAM(S): at 17:34

## 2023-01-01 RX ADMIN — APIXABAN 5 MILLIGRAM(S): 2.5 TABLET, FILM COATED ORAL at 10:46

## 2023-01-01 RX ADMIN — APIXABAN 5 MILLIGRAM(S): 2.5 TABLET, FILM COATED ORAL at 12:03

## 2023-01-01 RX ADMIN — PROPOFOL 12.5 MICROGRAM(S)/KG/MIN: 10 INJECTION, EMULSION INTRAVENOUS at 10:53

## 2023-01-01 RX ADMIN — Medication 40 MILLIGRAM(S): at 09:51

## 2023-01-01 RX ADMIN — Medication 100 MILLIGRAM(S): at 09:13

## 2023-01-01 RX ADMIN — Medication 40 MILLIGRAM(S): at 12:40

## 2023-01-01 RX ADMIN — APIXABAN 5 MILLIGRAM(S): 2.5 TABLET, FILM COATED ORAL at 22:44

## 2023-01-01 RX ADMIN — APIXABAN 5 MILLIGRAM(S): 2.5 TABLET, FILM COATED ORAL at 20:45

## 2023-01-01 RX ADMIN — Medication 650 MILLIGRAM(S): at 14:30

## 2023-01-01 RX ADMIN — OXYCODONE HYDROCHLORIDE 5 MILLIGRAM(S): 5 TABLET ORAL at 04:53

## 2023-01-01 RX ADMIN — AZITHROMYCIN 255 MILLIGRAM(S): 500 TABLET, FILM COATED ORAL at 22:46

## 2023-01-01 RX ADMIN — Medication 166.67 MILLIGRAM(S): at 14:08

## 2023-01-01 RX ADMIN — Medication 166.67 MILLIGRAM(S): at 10:24

## 2023-01-01 RX ADMIN — Medication 40 MILLIGRAM(S): at 10:23

## 2023-01-01 RX ADMIN — TAMSULOSIN HYDROCHLORIDE 0.4 MILLIGRAM(S): 0.4 CAPSULE ORAL at 23:25

## 2023-01-01 RX ADMIN — Medication 1 APPLICATION(S): at 21:11

## 2023-01-01 RX ADMIN — Medication 1 APPLICATION(S): at 09:31

## 2023-01-01 RX ADMIN — PANTOPRAZOLE SODIUM 40 MILLIGRAM(S): 20 TABLET, DELAYED RELEASE ORAL at 10:54

## 2023-01-01 RX ADMIN — APIXABAN 5 MILLIGRAM(S): 2.5 TABLET, FILM COATED ORAL at 09:52

## 2023-01-01 RX ADMIN — Medication 40 MILLIGRAM(S): at 09:20

## 2023-01-01 RX ADMIN — TAMSULOSIN HYDROCHLORIDE 0.4 MILLIGRAM(S): 0.4 CAPSULE ORAL at 22:44

## 2023-01-01 RX ADMIN — Medication 25 MILLIGRAM(S): at 10:52

## 2023-01-01 RX ADMIN — PANTOPRAZOLE SODIUM 40 MILLIGRAM(S): 20 TABLET, DELAYED RELEASE ORAL at 09:50

## 2023-01-01 RX ADMIN — Medication 40 MILLIGRAM(S): at 09:13

## 2023-01-01 RX ADMIN — LEVALBUTEROL 1.25 MILLIGRAM(S): 1.25 SOLUTION, CONCENTRATE RESPIRATORY (INHALATION) at 09:10

## 2023-01-01 RX ADMIN — Medication 100 MILLIGRAM(S): at 20:45

## 2023-01-01 RX ADMIN — Medication 1 APPLICATION(S): at 10:04

## 2023-01-01 RX ADMIN — Medication 100 MILLIGRAM(S): at 12:41

## 2023-01-01 RX ADMIN — PIPERACILLIN AND TAZOBACTAM 25 GRAM(S): 4; .5 INJECTION, POWDER, LYOPHILIZED, FOR SOLUTION INTRAVENOUS at 05:44

## 2023-01-01 RX ADMIN — APIXABAN 5 MILLIGRAM(S): 2.5 TABLET, FILM COATED ORAL at 10:03

## 2023-01-01 RX ADMIN — OXYCODONE HYDROCHLORIDE 5 MILLIGRAM(S): 5 TABLET ORAL at 23:30

## 2023-01-01 RX ADMIN — Medication 100 MILLIGRAM(S): at 17:42

## 2023-01-01 RX ADMIN — CEFTRIAXONE 1000 MILLIGRAM(S): 500 INJECTION, POWDER, FOR SOLUTION INTRAMUSCULAR; INTRAVENOUS at 19:27

## 2023-01-01 RX ADMIN — Medication 100 MILLIGRAM(S): at 10:23

## 2023-01-01 RX ADMIN — TAMSULOSIN HYDROCHLORIDE 0.4 MILLIGRAM(S): 0.4 CAPSULE ORAL at 21:00

## 2023-01-01 RX ADMIN — Medication 400 MILLIGRAM(S): at 12:43

## 2023-01-01 RX ADMIN — MEROPENEM 100 MILLIGRAM(S): 1 INJECTION INTRAVENOUS at 21:42

## 2023-01-01 RX ADMIN — LEVALBUTEROL 1.25 MILLIGRAM(S): 1.25 SOLUTION, CONCENTRATE RESPIRATORY (INHALATION) at 05:34

## 2023-01-01 RX ADMIN — Medication 100 MILLIGRAM(S): at 12:40

## 2023-01-01 RX ADMIN — APIXABAN 5 MILLIGRAM(S): 2.5 TABLET, FILM COATED ORAL at 09:31

## 2023-01-01 RX ADMIN — Medication 166.67 MILLIGRAM(S): at 10:47

## 2023-01-01 RX ADMIN — MORPHINE SULFATE 2 MILLIGRAM(S): 50 CAPSULE, EXTENDED RELEASE ORAL at 22:57

## 2023-01-01 RX ADMIN — CEFEPIME 100 MILLIGRAM(S): 1 INJECTION, POWDER, FOR SOLUTION INTRAMUSCULAR; INTRAVENOUS at 05:05

## 2023-01-01 RX ADMIN — OXYCODONE HYDROCHLORIDE 5 MILLIGRAM(S): 5 TABLET ORAL at 22:48

## 2023-01-01 RX ADMIN — Medication 1 APPLICATION(S): at 09:53

## 2023-01-02 NOTE — PROGRESS NOTE ADULT - NSPROGADDITIONALINFOA_GEN_ALL_CORE
I have personally interviewed and examined this patient, reviewed pertinent clinical information, and performed the evaluation and management services provided at today's visit for inpatient medical follow up    I am available to discuss any issues related to the medical care of this patient on the unit, or by phone at [phone]

## 2023-01-02 NOTE — PHYSICAL THERAPY INITIAL EVALUATION ADULT - GAIT DEVIATIONS NOTED, PT EVAL
small, shuffling steps, not able to correct with verbal cues./decreased jennifer/increased stride width

## 2023-01-02 NOTE — PROGRESS NOTE ADULT - SUBJECTIVE AND OBJECTIVE BOX
Date of service: 01-02-23 @ 16:49      Patient anxious about discharge planning; still with right lower extremity pain; afebrile      ROS unable to obtain secondary to patient medical condition     MEDICATIONS  (STANDING):  apixaban 5 milliGRAM(s) Oral every 12 hours  betamethasone valerate 0.1% Cream 1 Application(s) Topical two times a day  cefepime   IVPB      cefepime   IVPB 2000 milliGRAM(s) IV Intermittent every 12 hours  diphenhydrAMINE 25 milliGRAM(s) Oral at bedtime  doxycycline monohydrate Capsule 100 milliGRAM(s) Oral every 12 hours  metoprolol succinate  milliGRAM(s) Oral daily    MEDICATIONS  (PRN):  acetaminophen     Tablet .. 650 milliGRAM(s) Oral every 6 hours PRN Temp greater or equal to 38C (100.4F), Mild Pain (1 - 3)  aluminum hydroxide/magnesium hydroxide/simethicone Suspension 30 milliLiter(s) Oral every 4 hours PRN Dyspepsia  melatonin 3 milliGRAM(s) Oral at bedtime PRN Insomnia  ondansetron Injectable 4 milliGRAM(s) IV Push every 8 hours PRN Nausea and/or Vomiting      Vital Signs Last 24 Hrs  T(C): 36.3 (02 Jan 2023 16:31), Max: 37.3 (01 Jan 2023 20:30)  T(F): 97.3 (02 Jan 2023 16:31), Max: 99.1 (01 Jan 2023 20:30)  HR: 79 (02 Jan 2023 16:31) (75 - 81)  BP: 125/71 (02 Jan 2023 16:31) (115/75 - 142/78)  BP(mean): 89 (01 Jan 2023 20:30) (89 - 89)  RR: 18 (02 Jan 2023 16:31) (17 - 18)  SpO2: 95% (02 Jan 2023 16:31) (93% - 98%)    Parameters below as of 02 Jan 2023 16:31  Patient On (Oxygen Delivery Method): room air            Physical Exam:          PE:    Constitutional: frail looking  HEENT: NC/AT, EOMI, PERRLA, conjunctivae clear; ears and nose atraumatic; pharynx clear  Neck: supple; thyroid not palpable  Back: no tenderness  Respiratory: respiratory effort normal; clear to auscultation  Cardiovascular: S1S2 regular, no murmurs  Abdomen: soft, not tender, not distended, positive BS; no liver or spleen organomegaly  Genitourinary: no suprapubic tenderness  Musculoskeletal: no muscle tenderness, right lower extremity with erythema, edema; skin cracking from knee to toes  Neurological/ Psychiatric:   moving all extremities  Skin: no rashes; no palpable lesions    Labs: all available labs reviewed             Labs:                        13.9   3.97  )-----------( 217      ( 01 Jan 2023 05:46 )             42.6     01-01    136  |  104  |  23  ----------------------------<  101<H>  3.9   |  28  |  0.87    Ca    8.7      01 Jan 2023 05:46             Cultures:       Culture - Urine (collected 12-29-22 @ 15:23)  Source: Clean Catch None  Final Report (12-30-22 @ 21:42):    No growth    Culture - Blood (collected 12-29-22 @ 15:23)  Source: .Blood None  Preliminary Report (12-31-22 @ 02:03):    No growth to date.    Culture - Blood (collected 12-29-22 @ 15:23)  Source: .Blood None  Preliminary Report (12-31-22 @ 02:03):    No growth to date.              Radiology: all available radiological tests reviewed    Advanced directives addressed: full resuscitation
: no new complaints  vanco stopped and doxy added as per ID  wound care consult      PHYSICAL EXAM:    Daily     Daily     Vital Signs Last 24 Hrs  T(C): 37.2 (30 Dec 2022 15:38), Max: 37.2 (30 Dec 2022 15:38)  T(F): 99 (30 Dec 2022 15:38), Max: 99 (30 Dec 2022 15:38)  HR: 104 (30 Dec 2022 15:38) (73 - 104)  BP: 159/82 (30 Dec 2022 15:38) (141/82 - 159/82)  BP(mean): --  RR: 18 (30 Dec 2022 15:38) (18 - 18)  SpO2: 94% (30 Dec 2022 15:38) (94% - 98%)    Constitutional: Weak and ill appearing  HEENT: Atraumatic, GURPREET,   Respiratory: Breath Sounds normal, no rhonchi/wheeze  Cardiovascular: N S1S2;   Gastrointestinal: Abdomen soft, non tender, Bowel Sounds present  Extremities: No edema, peripheral pulses present  Neurological: AAO x 3, no gross focal motor deficits  Skin: right lower leg cellulitis  Lymph Nodes: No lymphadenopathy noted  Back: No CVA tenderness   Musculoskeletal: non tender  Breasts: Deferred  Genitourinary: deferred  Rectal: Deferred    All Labs/EKG/Radiology/Meds reviewed by me                          14.1   4.96  )-----------( 208      ( 30 Dec 2022 08:48 )             41.8       CBC Full  -  ( 30 Dec 2022 08:48 )  WBC Count : 4.96 K/uL  RBC Count : 4.39 M/uL  Hemoglobin : 14.1 g/dL  Hematocrit : 41.8 %  Platelet Count - Automated : 208 K/uL  Mean Cell Volume : 95.2 fl  Mean Cell Hemoglobin : 32.1 pg  Mean Cell Hemoglobin Concentration : 33.7 gm/dL  Auto Neutrophil # : 3.65 K/uL  Auto Lymphocyte # : 0.61 K/uL  Auto Monocyte # : 0.57 K/uL  Auto Eosinophil # : 0.10 K/uL  Auto Basophil # : 0.02 K/uL  Auto Neutrophil % : 73.6 %  Auto Lymphocyte % : 12.3 %  Auto Monocyte % : 11.5 %  Auto Eosinophil % : 2.0 %  Auto Basophil % : 0.4 %          134<L>  |  104  |  15  ----------------------------<  98  4.2   |  25  |  0.66    Ca    8.9      30 Dec 2022 08:48    TPro  6.7  /  Alb  2.5<L>  /  TBili  0.9  /  DBili  x   /  AST  18  /  ALT  13  /  AlkPhos  90  12-30      LIVER FUNCTIONS - ( 30 Dec 2022 08:48 )  Alb: 2.5 g/dL / Pro: 6.7 gm/dL / ALK PHOS: 90 U/L / ALT: 13 U/L / AST: 18 U/L / GGT: x             PT/INR - ( 29 Dec 2022 15:23 )   PT: 12.9 sec;   INR: 1.11 ratio         PTT - ( 29 Dec 2022 15:23 )  PTT:32.2 sec          Urinalysis Basic - ( 29 Dec 2022 15:23 )    Color: Yellow / Appearance: Clear / S.020 / pH: x  Gluc: x / Ketone: Negative  / Bili: Negative / Urobili: Negative   Blood: x / Protein: 30 mg/dL / Nitrite: Negative   Leuk Esterase: Small / RBC: 25-50 /HPF / WBC 6-10 /HPF   Sq Epi: x / Non Sq Epi: Occasional / Bacteria: Few        MEDICATIONS  (STANDING):  apixaban 5 milliGRAM(s) Oral every 12 hours  cefepime   IVPB      cefepime   IVPB 2000 milliGRAM(s) IV Intermittent every 12 hours  doxycycline monohydrate Capsule 100 milliGRAM(s) Oral every 12 hours  metoprolol succinate  milliGRAM(s) Oral daily    MEDICATIONS  (PRN):  acetaminophen     Tablet .. 650 milliGRAM(s) Oral every 6 hours PRN Temp greater or equal to 38C (100.4F), Mild Pain (1 - 3)  aluminum hydroxide/magnesium hydroxide/simethicone Suspension 30 milliLiter(s) Oral every 4 hours PRN Dyspepsia  melatonin 3 milliGRAM(s) Oral at bedtime PRN Insomnia  ondansetron Injectable 4 milliGRAM(s) IV Push every 8 hours PRN Nausea and/or Vomiting  
Cheif complaints and Diagnosis: cellulitis, rt LE/ crockett catheter malfunction    Subjective: no complaints      REVIEW OF SYSTEMS:    CONSTITUTIONAL: No weakness, fevers or chills  EYES/ENT: No visual changes;  No vertigo or throat pain   NECK: No pain or stiffness  RESPIRATORY: No cough, wheezing, hemoptysis; No shortness of breath  CARDIOVASCULAR: No chest pain or palpitations  GASTROINTESTINAL: No abdominal or epigastric pain. No nausea, vomiting, or hematemesis; No diarrhea or constipation. No melena or hematochezia.  GENITOURINARY: No dysuria, frequency or hematuria  NEUROLOGICAL: No numbness or weakness  SKIN: No itching, burning, rashes, or lesions   All other review of systems is negative unless indicated above      Vital Signs Last 24 Hrs  T(C): 36.8 (31 Dec 2022 08:06), Max: 37.6 (31 Dec 2022 00:29)  T(F): 98.3 (31 Dec 2022 08:06), Max: 99.7 (31 Dec 2022 00:29)  HR: 75 (31 Dec 2022 08:06) (75 - 104)  BP: 134/87 (31 Dec 2022 08:06) (134/87 - 159/82)  BP(mean): --  RR: 18 (31 Dec 2022 08:06) (18 - 18)  SpO2: 95% (31 Dec 2022 08:06) (94% - 95%)    Parameters below as of 31 Dec 2022 08:06  Patient On (Oxygen Delivery Method): room air        HEENT:   pupils equal and reactive, EOMI, no oropharyngeal lesions, erythema, exudates, oral thrush    NECK:   supple, no carotid bruits, no palpable lymph nodes, no thyromegaly    CV:  +S1, +S2, regular, no murmurs or rubs    RESP:   lungs clear to auscultation bilaterally, no wheezing, rales, rhonchi, good air entry bilaterally    BREAST:  not examined    GI:  abdomen soft, non-tender, non-distended, normal BS, no bruits, no abdominal masses, no palpable masses    RECTAL:  not examined    :  not examined    MSK:   normal muscle tone, no atrophy, no rigidity, no contractions    EXT:   no clubbing, no cyanosis, no edema, no calf pain, swelling or erythema    VASCULAR:  pulses equal and symmetric in the upper and lower extremities    NEURO:  AAOX3, no focal neurological deficits, follows all commands, able to move extremities spontaneously    SKIN:  no ulcers, lesions or rashes    MEDICATIONS  (STANDING):  apixaban 5 milliGRAM(s) Oral every 12 hours  cefepime   IVPB      cefepime   IVPB 2000 milliGRAM(s) IV Intermittent every 12 hours  doxycycline monohydrate Capsule 100 milliGRAM(s) Oral every 12 hours  metoprolol succinate  milliGRAM(s) Oral daily    MEDICATIONS  (PRN):  acetaminophen     Tablet .. 650 milliGRAM(s) Oral every 6 hours PRN Temp greater or equal to 38C (100.4F), Mild Pain (1 - 3)  aluminum hydroxide/magnesium hydroxide/simethicone Suspension 30 milliLiter(s) Oral every 4 hours PRN Dyspepsia  melatonin 3 milliGRAM(s) Oral at bedtime PRN Insomnia  ondansetron Injectable 4 milliGRAM(s) IV Push every 8 hours PRN Nausea and/or Vomiting      Urinalysis Basic - ( 29 Dec 2022 15:23 )    Color: Yellow / Appearance: Clear / S.020 / pH: x  Gluc: x / Ketone: Negative  / Bili: Negative / Urobili: Negative   Blood: x / Protein: 30 mg/dL / Nitrite: Negative   Leuk Esterase: Small / RBC: 25-50 /HPF / WBC 6-10 /HPF   Sq Epi: x / Non Sq Epi: Occasional / Bacteria: Few    30 Dec 2022 08:48    134    |  104    |  15     ----------------------------<  98     4.2     |  25     |  0.66     Ca    8.9        30 Dec 2022 08:48    TPro  6.7    /  Alb  2.5    /  TBili  0.9    /  DBili  x      /  AST  18     /  ALT  13     /  AlkPhos  90     30 Dec 2022 08:48  LIVER FUNCTIONS - ( 30 Dec 2022 08:48 )  Alb: 2.5 g/dL / Pro: 6.7 gm/dL / ALK PHOS: 90 U/L / ALT: 13 U/L / AST: 18 U/L / GGT: x         PT/INR - ( 29 Dec 2022 15:23 )   PT: 12.9 sec;   INR: 1.11 ratio         PTT - ( 29 Dec 2022 15:23 )  PTT:32.2 secCBC Full  -  ( 30 Dec 2022 08:48 )  WBC Count : 4.96 K/uL  Hemoglobin : 14.1 g/dL  Hematocrit : 41.8 %  Platelet Count - Automated : 208 K/uL  Mean Cell Volume : 95.2 fl  Mean Cell Hemoglobin : 32.1 pg  Mean Cell Hemoglobin Concentration : 33.7 gm/dL  Auto Neutrophil # : 3.65 K/uL  Auto Lymphocyte # : 0.61 K/uL  Auto Monocyte # : 0.57 K/uL  Auto Eosinophil # : 0.10 K/uL  Auto Basophil # : 0.02 K/uL  Auto Neutrophil % : 73.6 %  Auto Lymphocyte % : 12.3 %  Auto Monocyte % : 11.5 %  Auto Eosinophil % : 2.0 %  Auto Basophil % : 0.4 %            PT/INR - ( 29 Dec 2022 15:23 )   PT: 12.9 sec;   INR: 1.11 ratio         PTT - ( 29 Dec 2022 15:23 )  PTT:32.2 sec        Assessment and Plan:   	  81 y/o M PMHx significant for Hypertension, Hyperlipidemia, OA, BPH, A-fib (on Eliquis), urinary retention presents to the [facility] for further evaluation and management of Crockett catheter malfunction. Of note the patient's Crockett catheter was accidentally pulled this morning causing urine to leak around the catheter. In the ED the patient was found to have right > left lower extremity swelling/edema, and erythema associated w/ (+) tenderness.      #Acute Right Lower Extremity Cellulitis:  ~admit to Medicine  ~f/u PAN C+S  ~cont. IV cefepime + po doxy as per ID  Appreciate ID consult  appreciate wound care consult  Lower Extremity US Duplex (-) for DVT    #Crockett catheter malfunction  ~ED replaced Crockett  ~monitor I/Os    #Hypertension/Atrial Fibrillation  ~cont, Metoprolol ER 100mg po daily  ~cont. Eliquis 5mg po daily    #Vte ppx  ~on Eliquis    POC discussed with pt, his daughter, Ramona,  at bedside, team.       
Cheif complaints and Diagnosis: cellulitis/ ulcers of the right leg.     Subjective: no complaints      REVIEW OF SYSTEMS:    CONSTITUTIONAL: No weakness, fevers or chills  EYES/ENT: No visual changes;  No vertigo or throat pain   NECK: No pain or stiffness  RESPIRATORY: No cough, wheezing, hemoptysis; No shortness of breath  CARDIOVASCULAR: No chest pain or palpitations  GASTROINTESTINAL: No abdominal or epigastric pain. No nausea, vomiting, or hematemesis; No diarrhea or constipation. No melena or hematochezia.  GENITOURINARY: No dysuria, frequency or hematuria  NEUROLOGICAL: No numbness or weakness  SKIN: No itching, burning, rashes, or lesions   All other review of systems is negative unless indicated above      Vital Signs Last 24 Hrs  T(C): 36.8 (31 Dec 2022 17:35), Max: 36.8 (31 Dec 2022 17:35)  T(F): 98.2 (31 Dec 2022 17:35), Max: 98.2 (31 Dec 2022 17:35)  HR: 80 (31 Dec 2022 17:35) (80 - 80)  BP: 105/67 (31 Dec 2022 17:35) (105/67 - 105/67)  BP(mean): --  RR: 18 (31 Dec 2022 17:35) (18 - 18)  SpO2: 96% (31 Dec 2022 17:35) (96% - 96%)    Parameters below as of 31 Dec 2022 17:35  Patient On (Oxygen Delivery Method): room air        HEENT:   pupils equal and reactive, EOMI, no oropharyngeal lesions, erythema, exudates, oral thrush    NECK:   supple, no carotid bruits, no palpable lymph nodes, no thyromegaly    CV:  +S1, +S2, regular, no murmurs or rubs    RESP:   lungs clear to auscultation bilaterally, no wheezing, rales, rhonchi, good air entry bilaterally    BREAST:  not examined    GI:  abdomen soft, non-tender, non-distended, normal BS, no bruits, no abdominal masses, no palpable masses    RECTAL:  not examined    :  not examined    MSK:   normal muscle tone, no atrophy, no rigidity, no contractions    EXT:   no clubbing, no cyanosis, no edema, no calf pain, swelling or erythema    VASCULAR:  pulses equal and symmetric in the upper and lower extremities    NEURO:  AAOX3, no focal neurological deficits, follows all commands, able to move extremities spontaneously    SKIN:  no ulcers, lesions or rashes    MEDICATIONS  (STANDING):  apixaban 5 milliGRAM(s) Oral every 12 hours  betamethasone valerate 0.1% Cream 1 Application(s) Topical two times a day  cefepime   IVPB      cefepime   IVPB 2000 milliGRAM(s) IV Intermittent every 12 hours  diphenhydrAMINE 25 milliGRAM(s) Oral at bedtime  doxycycline monohydrate Capsule 100 milliGRAM(s) Oral every 12 hours  metoprolol succinate  milliGRAM(s) Oral daily    MEDICATIONS  (PRN):  acetaminophen     Tablet .. 650 milliGRAM(s) Oral every 6 hours PRN Temp greater or equal to 38C (100.4F), Mild Pain (1 - 3)  aluminum hydroxide/magnesium hydroxide/simethicone Suspension 30 milliLiter(s) Oral every 4 hours PRN Dyspepsia  melatonin 3 milliGRAM(s) Oral at bedtime PRN Insomnia  ondansetron Injectable 4 milliGRAM(s) IV Push every 8 hours PRN Nausea and/or Vomiting      01 Jan 2023 05:46    136    |  104    |  23     ----------------------------<  101    3.9     |  28     |  0.87     Ca    8.7        01 Jan 2023 05:46    CBC Full  -  ( 01 Jan 2023 05:46 )  WBC Count : 3.97 K/uL  Hemoglobin : 13.9 g/dL  Hematocrit : 42.6 %  Platelet Count - Automated : 217 K/uL  Mean Cell Volume : 97.3 fl  Mean Cell Hemoglobin : 31.7 pg  Mean Cell Hemoglobin Concentration : 32.6 gm/dL  Auto Neutrophil # : x  Auto Lymphocyte # : x  Auto Monocyte # : x  Auto Eosinophil # : x  Auto Basophil # : x  Auto Neutrophil % : x  Auto Lymphocyte % : x  Auto Monocyte % : x  Auto Eosinophil % : x  Auto Basophil % : x                  PT/INR - ( 29 Dec 2022 15:23 )   PT: 12.9 sec;   INR: 1.11 ratio         PTT - ( 29 Dec 2022 15:23 )  PTT:32.2 sec        Assessment and Plan:   	  83 y/o M PMHx significant for Hypertension, Hyperlipidemia, OA, BPH, A-fib (on Eliquis), urinary retention presents to the [facility] for further evaluation and management of Goode catheter malfunction. Of note the patient's Goode catheter was accidentally pulled this morning causing urine to leak around the catheter. In the ED the patient was found to have right > left lower extremity swelling/edema, and erythema associated w/ (+) tenderness.      #Acute Right Lower Extremity Cellulitis:  ~admit to Medicine  ~f/u PAN C+S  ~cont. IV cefepime + po doxy as per ID  Appreciate ID consult  appreciate wound care consult  Lower Extremity US Duplex (-) for DVT    #Goode catheter malfunction  ~ED replaced Goode  ~monitor I/Os    #Hypertension/Atrial Fibrillation  ~cont, Metoprolol ER 100mg po daily  ~cont. Eliquis 5mg po daily    #Vte ppx  ~on Eliquis    POC discussed with pt, his daughter, Ramona,  at bedside, team.           Dispo: anticipate DC tuesday; d/w daughter, patient needs good wound care. lives alone. unlikley he will do good wound care, even with visiting nurse. alternative idea wouild be to go to rehab for few weeks.   will place PT consult
Patient is a 82y old  Male who presents with a chief complaint of Goode catheter malfunction, Right lower leg erythema (01 Jan 2023 14:57)      History, interim events and clinically pertinent issues reviewed; patient interviewed and examined.  He has family at bedside and has no new complaints other than not getting out of bed today and not having his dressing changed  REVIEW OF SYMPTOMS: patient denies HA's, CP, palpitations, shortness of breath or upper respiratory symptoms, nausea, vomiting, diarrhea, constipation, dysuria, bruising/bleeding and all other systems were reviewed as negative      ALLERGIES:  No Known Allergies    MEDICATIONS  (STANDING):  apixaban 5 milliGRAM(s) Oral every 12 hours  betamethasone valerate 0.1% Cream 1 Application(s) Topical two times a day  cefepime   IVPB      cefepime   IVPB 2000 milliGRAM(s) IV Intermittent every 12 hours  diphenhydrAMINE 25 milliGRAM(s) Oral at bedtime  doxycycline monohydrate Capsule 100 milliGRAM(s) Oral every 12 hours  metoprolol succinate  milliGRAM(s) Oral daily    MEDICATIONS  (PRN):  acetaminophen     Tablet .. 650 milliGRAM(s) Oral every 6 hours PRN Temp greater or equal to 38C (100.4F), Mild Pain (1 - 3)  aluminum hydroxide/magnesium hydroxide/simethicone Suspension 30 milliLiter(s) Oral every 4 hours PRN Dyspepsia  melatonin 3 milliGRAM(s) Oral at bedtime PRN Insomnia  ondansetron Injectable 4 milliGRAM(s) IV Push every 8 hours PRN Nausea and/or Vomiting    Vital Signs Last 24 Hrs  T(F): 97.3 (02 Jan 2023 08:17), Max: 99.1 (01 Jan 2023 20:30)  HR: 81 (02 Jan 2023 08:17) (65 - 81)  BP: 142/78 (02 Jan 2023 08:17) (115/75 - 142/78)  RR: 18 (02 Jan 2023 08:17) (17 - 18)  SpO2: 93% (02 Jan 2023 08:17) (93% - 98%)  I&O's Summary    01 Jan 2023 07:01  -  02 Jan 2023 07:00  --------------------------------------------------------  IN: 1140 mL / OUT: 1550 mL / NET: -410 mL      BMI (kg/m2): 31.2 (12-29-22 @ 11:32)          PHYSICAL EXAM:  General: NAD, A/O x 3  ENT: MMM  Neck: Supple, No JVD  Lungs: Clear to auscultation bilaterally  Cardio: RRR, S1/S2, No murmurs  Abdomen: Soft, Nontender, Nondistended; Bowel sounds present  Extremities: No calf tenderness   Rt LE dressing removed ; +Bilateral venous stasis changes LE's ; right LE posterior stasis ulcer cleaned w/ NS and new dressing applied      LABS:                        13.9   3.97  )-----------( 217      ( 01 Jan 2023 05:46 )             42.6       01-01    136  |  104  |  23  ----------------------------<  101  3.9   |  28  |  0.87    Ca    8.7      01 Jan 2023 05:46                                  Culture - Urine (collected 29 Dec 2022 15:23)  Source: Clean Catch None  Final Report (30 Dec 2022 21:42):    No growth    Culture - Blood (collected 29 Dec 2022 15:23)  Source: .Blood None  Preliminary Report (31 Dec 2022 02:03):    No growth to date.    Culture - Blood (collected 29 Dec 2022 15:23)  Source: .Blood None  Preliminary Report (31 Dec 2022 02:03):    No growth to date.

## 2023-01-02 NOTE — PROGRESS NOTE ADULT - REASON FOR ADMISSION
Goode catheter malfunction, Right lower leg erythema

## 2023-01-02 NOTE — PROGRESS NOTE ADULT - ASSESSMENT
83 y/o M PMHx significant for Hypertension, Hyperlipidemia, OA, BPH, A-fib (on Eliquis), urinary retention presents to the [facility] for further evaluation and management of Goode catheter malfunction. Of note the patient's Goode catheter was accidentally pulled this morning causing urine to leak around the catheter. In the ED the patient was found to have right > left lower extremity swelling/edema, and erythema associated w/ (+) tenderness.    #Acute Right Lower Extremity Cellulitis:  ~admit to Medicine  ~f/u PAN C+S  ~cont. IV cefepime + po doxy as per ID  Appreciate ID consult  appreciate wound care consult  Lower Extremity US Duplex (-) for DVT    #Goode catheter malfunction  ~ED replaced Goode  ~monitor I/Os    #Hypertension/Atrial Fibrillation  ~cont, Metoprolol ER 100mg po daily  ~cont. Eliquis 5mg po daily    #Vte ppx  ~on Eliquis    POC discussed with pt, his daughter, Ramona,  at bedside, team. 
Assessment and Plan:   	  81 y/o M PMHx significant for Hypertension, Hyperlipidemia, OA, BPH, A-fib (on Eliquis), urinary retention presents to the [facility] for further evaluation and management of Goode catheter malfunction. Of note the patient's Goode catheter was accidentally pulled this morning causing urine to leak around the catheter. In the ED the patient was found to have right > left lower extremity swelling/edema, and erythema associated w/ (+) tenderness.      #Acute Right Lower Extremity Cellulitis:  ~admit to Medicine  ~f/u PAN C+S  ~cont. IV cefepime + po doxy as per ID  Appreciate ID consult  appreciate wound care consult  Lower Extremity US Duplex (-) for DVT    #Goode catheter malfunction  ~ED replaced Goode which is chronic since Nov 2022  ~monitor I/Os  outpatient  follow up Dr Godfrey ;   #Hypertension/Atrial Fibrillation  ~cont, Metoprolol ER 100mg po daily  ~cont. Eliquis 5mg po daily    #Vte ppx  ~on Eliquis    POC discussed with pt, his daughter, Ramona,  at bedside 1/2      Dispo: anticipate DC tuesday; d/w daughter, patient needs good wound care. lives alone and case management has d/w [facility]   PT consult pending  
83 y/o M PMHx significant for Hypertension, Hyperlipidemia, OA, BPH, A-fib (on Eliquis), urinary retention admitted on 12/29 for evaluation of misplaced crockett; the patient was admitted for replacement and noted to have right lower extremity redness, and swelling, with skin desquamation; the patient is poor historian and history per medical record.     1. Patient admitted with right lower extremity cellulitis; had crockett catheter replaced  - follow up cultures   - serial cbc and monitor temperature   - reviewed prior medical records to evaluate for resistant or atypical pathogens   - iv hydration and supportive care   - elevate legs  - when ready for discharge can discharge on 7 more days ceftin 250 mg po q 12 hours plus doxycycline 100 mg po q 12 hours  2. other issues; per medicine

## 2023-01-02 NOTE — PHYSICAL THERAPY INITIAL EVALUATION ADULT - PATIENT PROFILE REVIEW, REHAB EVAL
PMHx significant for Hypertension, Hyperlipidemia, OA, BPH, A-fib (on Eliquis), urinary retention/yes

## 2023-01-02 NOTE — PHYSICAL THERAPY INITIAL EVALUATION ADULT - GENERAL OBSERVATIONS, REHAB EVAL
Patient received out of bed in chair in TCU, +Goode (Chronic), +R LE wrapped in gauze.  Patient denied pain.

## 2023-01-02 NOTE — PHYSICAL THERAPY INITIAL EVALUATION ADULT - MODALITIES TREATMENT COMMENTS
Patient returned to bed by request, call bell in reach and bed alarm active. RN informed of session/status.

## 2023-01-02 NOTE — PROVIDER CONTACT NOTE (OTHER) - BACKGROUND
Pt admitted 12/29 with leaking around crcokett. Crockett changed in ED. Pt admitted with cellulitis.

## 2023-01-03 NOTE — DISCHARGE NOTE PROVIDER - NSDCCPCAREPLAN_GEN_ALL_CORE_FT
PRINCIPAL DISCHARGE DIAGNOSIS  Diagnosis: Bilateral lower leg cellulitis  Assessment and Plan of Treatment: antibiotic for 7 more days      SECONDARY DISCHARGE DIAGNOSES  Diagnosis: Enlarged prostate  Assessment and Plan of Treatment:     Diagnosis: Malfunction of Goode catheter, initial encounter  Assessment and Plan of Treatment:     Diagnosis: Venous insufficiency of leg  Assessment and Plan of Treatment:

## 2023-01-03 NOTE — DISCHARGE NOTE NURSING/CASE MANAGEMENT/SOCIAL WORK - PATIENT PORTAL LINK FT
You can access the FollowMyHealth Patient Portal offered by [facility] by registering at the following website: [email or web address]. By joining [facility]’s FollowMyHealth portal, you will also be able to view your health information using other applications (apps) compatible with our system.

## 2023-01-03 NOTE — DISCHARGE NOTE PROVIDER - HOSPITAL COURSE
83 y/o M PMHx significant for Hypertension, Hyperlipidemia, OA, BPH, A-fib (on Eliquis), urinary retention presents to the [facility] [place] for further evaluation and management of Crockett catheter malfunction. Of note the patient's Crockett catheter was accidentally pulled this morning causing urine to leak around the catheter. In the ED the patient was found to have right > left lower extremity swelling/edema, and erythema associated w/ (+) tenderness.      #Acute Right Lower Extremity Cellulitis:  Patient admitted with right lower extremity cellulitis; had crockett catheter replaced    - when ready for discharge can discharge on 7 more days ceftin 250 mg po q 12 hours plus doxycycline 100 mg po q 12 hours      #Crockett catheter malfunction  ~ED replaced Crockett which is chronic since Nov 2022  ~monitor I/Os  outpatient  follow up Dr Godfrey ;   #Hypertension/Atrial Fibrillation  ~cont, Metoprolol ER 100mg po daily  ~cont. Eliquis 5mg po daily    #Vte ppx  ~on Eliquis

## 2023-01-03 NOTE — DISCHARGE NOTE PROVIDER - NSDCMRMEDTOKEN_GEN_ALL_CORE_FT
betamethasone valerate 0.1% topical cream: 1 application topically 2 times a day  cefuroxime 250 mg oral tablet: 1 tab(s) orally every 12 hours until 1/10  doxycycline monohydrate 50 mg oral capsule: 2 cap(s) orally every 12 hours until 1/10  Eliquis 5 mg oral tablet: 1 tab(s) orally 2 times a day  metoprolol succinate 100 mg oral tablet, extended release: 1 tab(s) orally once a day

## 2023-01-03 NOTE — DISCHARGE NOTE PROVIDER - CARE PROVIDER_API CALL
Valentin Taylor)  Internal Medicine  [address], [address] [address]  Phone: [phone]  Fax: [phone]  Follow Up Time: 2 weeks

## 2023-01-03 NOTE — DISCHARGE NOTE NURSING/CASE MANAGEMENT/SOCIAL WORK - NSDCPEFALRISK_GEN_ALL_CORE
For information on Fall & Injury Prevention, visit: [email or web address] OR  [email or web address] OR  https://www.cdc.gov/steadi/patient.html

## 2023-01-03 NOTE — DISCHARGE NOTE NURSING/CASE MANAGEMENT/SOCIAL WORK - NSDCVIVACCINE_GEN_ALL_CORE_FT
Tdap; 12-Jan-2021 16:41; Viviana Alvarez (NINI); [employer]; [id number] (Exp. Date: 31-Jul-2022); IntraMuscular; Deltoid Left.; 0.5 milliLiter(s); VIS (VIS Published: 09-May-2013, VIS Presented: 12-Jan-2021);

## 2023-01-17 PROBLEM — R31.0 GROSS HEMATURIA: Status: ACTIVE | Noted: 2021-07-29

## 2023-01-27 PROBLEM — M79.89 LEG SWELLING: Status: ACTIVE | Noted: 2023-01-01

## 2023-01-27 NOTE — HISTORY OF PRESENT ILLNESS
[FreeTextEntry1] : \par 83 yo M referred for consultation\par previously saw Dr. Godfrey and Elvis\par s/p Urolift procedures\par still with severe UUI and now with a crockett catheter\par on eliquis ( not sure why)\par \par when asked only knows he has a leaking valve\par mentions some swelling in the right leg, was already seen by his PCP, not sure regarding any imaging\par will need urgent US and possible consult with his PCP\par \par here to discuss options for LEP\par last CTU with large bladder stone too\par discussed cystolitholapaxy and LEP\par Discussed Laser enucleation of prostate with morcellation (HOLEP/THuLEP). Procedure, in hospital stay and recovery explained.\par \par \par Indications, options including chronic crockett catheter, suprapubic catheter, intermittent self-catheterization, transurethral resection of prostate, transurethral vaporization of prostate with laser or electrocautery, open or laparoscopic enucleation of the prostate, all discussed.\par \par Potential complications of transurethral holmium or thulium laser enucleation of prostate with morcellation discussed. This included risk of infection, bleeding, transfusion, conversion to open enucleation, need for staged procedure, adjacent organ injury, bladder injury, clot retention of urine requiring return to the operating room for cystoscopy clot evacuation, prolonged duration of crockett catheterization, urethral stricture, transient or permanent urinary incontinence,  retrograde ejaculation as a permanent and irreversible complication, redo or staged  surgery due to equipment malfunction, anesthetic complications, and cardiac complications all discussed.\par \par Printed information including all of the above and more uncommon complications provided to the patient. \par \par \par plan:\par - recommend discussing with PCP regarding the leg\par - ordered urgent ultrasound to rule out DVT\par - will need medical and cardiology clearance\par - will need to stop eliquis before surgery\par - will schedule cystolitholapaxy and LEP\par

## 2023-02-04 NOTE — ED STATDOCS - PROGRESS NOTE DETAILS
83 y/o M with PMH of HLD, BPH, a-fib, chronic indwelling crockett presents with accidental crockett displacement. Pt was chasing after his garbage cans which were blowing away when his crockett became caught and was dislodged. no other injuries noted. Denies hematuria. Pt was incontinent of urine PTA in ED. PE: Well appearing. Cardiac: s1s2, RRR. lungs: CTAB. Abdomen: NBS x4, soft, nontender. : No blood at meatus. No obvious injury. Clothes soaked with urine. A/P: Will replace crockett, dc home. - Jonn Sylvester PA-C

## 2023-02-04 NOTE — ED STATDOCS - PHYSICAL EXAMINATION
Constitutional: NAD AOx3  Eyes: PERRL EOMI  Head: Normocephalic atraumatic  Mouth: MMM  Cardiac: regular rate and rhythm  Resp: Lungs CTAB  GI: Abd s/nd/nt  Neuro: CN2-12 grossly intact, KNIGHT x 4  Skin: No visible rashes

## 2023-02-04 NOTE — ED STATDOCS - OBJECTIVE STATEMENT
81 y/o male with PMHx of varicose veins, OA of hip, hypercholesteremia, HTN, Afib, legally blind, BPH, colonoscopy, cystoscopy, chronic Goode catheter presents to the ED c/o lost Goode while chasing his garbage cans yesterday. His Goode tube got caught in the trash can and was yanked out. Patient now with urinary incontinence. Denies hematuria or other symptoms.

## 2023-02-04 NOTE — ED ADULT NURSE NOTE - OBJECTIVE STATEMENT
patient axox3, s/p accidental urinary catheter removal. patient states he was running after his garbage can in the wind when his drainage bag got stuck and entire crockett system got yanked out. patient states he has a crockett catheter for urinary incontinence. patient p/w urinary incontinence in ED. patient denies headache, vision changes, n/v/d, fever, chills, cough, chest pain, SOB. color good, skin warm and dry, respirations even and unlabored. patient able to speak in full sentences. urologist is dr. cruz.

## 2023-02-04 NOTE — ED STATDOCS - PATIENT PORTAL LINK FT
You can access the FollowMyHealth Patient Portal offered by [facility] by registering at the following website: [email or web address]. By joining [facility]’s FollowMyHealth portal, you will also be able to view your health information using other applications (apps) compatible with our system.

## 2023-02-04 NOTE — ED ADULT TRIAGE NOTE - CHIEF COMPLAINT QUOTE
pt presents to ED c/o crockett catheter dislodgement. pt states he was chasing his garbage pails last night when crockett tubing became caught in pails and was ripped out. pt states he has chronic crockett and is urinating.

## 2023-02-18 NOTE — ED ADULT TRIAGE NOTE - CHIEF COMPLAINT QUOTE
Pt presents to ER c/o leaking crockett catheter. Pt reports he had crockett put in on 2/4 and now the bag is leaking. Denies any other complaints

## 2023-02-18 NOTE — ED PROVIDER NOTE - OBJECTIVE STATEMENT
82-year-old male history of hypertension high cholesterol A-fib chronic Goode catheter presents to the ED for leaking from Goode catheter bag.  Patient states that the bag got stuck on his pants when he was changing and the tubing got slightly disconnected from the bag.  Did not have any replacement bags at home so came in for a new bag.  Patient denies any fevers chills chest pain shortness of breath abdominal pain nausea or vomiting.  Patient states he has had a chronic Goode catheter for the past few years for urinary incontinence.  Follows with Dr. Godfrey of urology.  Goode was last changed in the beginning of February and states he does not need a new Goode catheter at this time.  The urine is clear yellow and is draining freely into the bag however the bag is not holding the urine.

## 2023-02-18 NOTE — ED PROVIDER NOTE - CLINICAL SUMMARY MEDICAL DECISION MAKING FREE TEXT BOX
82-year-old male history of hypertension high cholesterol A-fib chronic Crockett catheter presents to the ED for leaking from Crockett catheter bag.  Patient states that the bag got stuck on his pants when he was changing and the tubing got slightly disconnected from the bag.  Did not have any replacement bags at home so came in for a new bag.  Patient denies any fevers chills chest pain shortness of breath abdominal pain nausea or vomiting.  Patient states he has had a chronic Crockett catheter for the past few years for urinary incontinence.  Follows with Dr. Godfrey of urology.  Crockett was last changed in the beginning of February and states he does not need a new Crockett catheter at this time.  The urine is clear yellow and is draining freely into the bag however the bag is not holding the urine. exam non-focal. will change crockett bag and dc with uro follow up. Seth Ortiz M.D., Attending Physician

## 2023-02-18 NOTE — ED PROVIDER NOTE - PATIENT PORTAL LINK FT
You can access the FollowMyHealth Patient Portal offered by [facility] by registering at the following website: [email or web address]. By joining [facility]’s FollowMyHealth portal, you will also be able to view your health information using other applications (apps) compatible with our system.

## 2023-02-18 NOTE — ED ADULT NURSE NOTE - NSIMPLEMENTINTERV_GEN_ALL_ED
Implemented All Universal Safety Interventions:  [place] to call system. Call bell, personal items and telephone within reach. Instruct patient to call for assistance. Room bathroom lighting operational. Non-slip footwear when patient is off stretcher. Physically safe environment: no spills, clutter or unnecessary equipment. Stretcher in lowest position, wheels locked, appropriate side rails in place.

## 2023-02-18 NOTE — ED ADULT NURSE NOTE - OBJECTIVE STATEMENT
pt arrived c/o leaking crockett catheter. pt states he had crockett placed last week and bag has since been leaking. no distress noted in pt. pt alert and oriented.

## 2023-02-20 NOTE — ED PROVIDER NOTE - CLINICAL SUMMARY MEDICAL DECISION MAKING FREE TEXT BOX
82M hx of AF on Eliquis, BPH, HTN, HLD, chronic Goode for urinary incontinence, seen 2 days ago in the ED for a leaking Goode bag s/p replacement, re-presenting with a Goode issue (leaking around urethra). On exam, nontoxic appearing, VS wnl, irregular HR, rate controlled, abdomen benign, Goode in place but leaking around urethra. RN replaced Goode with 14Fr, but leaking persisted. Will upsize to 18Fr or 20 Fr and likely d/c with uro follow up.

## 2023-02-20 NOTE — ED ADULT TRIAGE NOTE - CHIEF COMPLAINT QUOTE
Patient presented to the ED ambulatory from home c/o urinary catheter complications. Patient states "I don't know if its broken or leaking but something is wrong". Patient does not have any other complaints at this time. Patient denies pain or discomfort at this time.

## 2023-02-20 NOTE — ED ADULT NURSE NOTE - NS ED NURSE DISCH DISPOSITION
6/24/2022      RE: Keerthi Montoya  [address] [place] [phone]       Dear Colleague,    Thank you for the opportunity to participate in the care of your patient, Keerthi Montoya, at the [facility]. Please see a copy of my visit note below.    No notes on file    Please do not hesitate to contact me if you have any questions/concerns.     Sincerely,     WENDI Aaron CNP   Discharged

## 2023-02-20 NOTE — ED ADULT NURSE REASSESSMENT NOTE - NS ED NURSE REASSESS COMMENT FT1
Goode irrigation attempted without success. Goode replaced with a 16F catheter. Leakage continued. MD Watson at bedside. Goode catheter replaced with 20F. +bloody urine in bag. Leakage continuing after placing 20F catheter, MD Watson made aware, urology to be consulted. Patient denies pain and/or discomfort.

## 2023-02-20 NOTE — ED PROVIDER NOTE - PHYSICAL EXAMINATION
GENERAL: non-toxic appearing, in NAD  HEAD: atraumatic, normocephalic  EYES: vision grossly intact, no conjunctivitis or discharge  EARS: hearing grossly intact  NOSE: no nasal discharge, epistaxis   CARDIAC: irregular rhythm, normal S1S2,  no appreciable murmurs, no cyanosis, cap refill < 2 seconds  PULM: no respiratory distress, oxygen saturation on RA wnl, CTAB, no crackles, rales, rhonchi, or wheezing  GI: abdomen nondistended, soft, nontender, no guarding or rebound tenderness, no palpable masses  : Goode in-place with leakage around urethra  NEURO: awake and alert, follows commands, normal speech, PERRLA, EOMI, no focal motor or sensory deficits  MSK: spine appears normal, no joint swelling or erythema, no gross deformities of extremities  EXT: + bilateral peripheral edema, calf tenderness, redness or swelling  SKIN: warm, dry, and intact, no rashes  PSYCH: appropriate mood and affect

## 2023-02-20 NOTE — CONSULT NOTE ADULT - SUBJECTIVE AND OBJECTIVE BOX
82M hx of AF on Eliquis, BPH, HTN, HLD, chronic Crockett for urinary incontinence, seen 2 days ago in the ED for a leaking Crockett bag s/p replacement, re-presenting with a Crockett issue (leaking around urethra). Denies fever, chills, nausea, vomiting, chest pain, dyspnea, flank or back pain.        Vital Signs Last 24 Hrs  T(C): 35.4 (20 Feb 2023 05:25), Max: 35.4 (20 Feb 2023 05:25)  T(F): 95.7 (20 Feb 2023 05:25), Max: 95.7 (20 Feb 2023 05:25)  HR: 87 (20 Feb 2023 05:25) (87 - 87)  BP: 146/85 (20 Feb 2023 05:25) (146/85 - 146/85)  BP(mean): 103 (20 Feb 2023 05:25) (103 - 103)  RR: 18 (20 Feb 2023 05:25) (18 - 18)  SpO2: 95% (20 Feb 2023 05:25) (95% - 95%)    Parameters below as of 20 Feb 2023 05:25  Patient On (Oxygen Delivery Method): room air        I&O's Summary      Physical Exam  Gen: NAD, comfortable in bed  Neuro: A&Ox3  Lungs: CTAB  CV: S1/S2, RRR  Abd: Soft, ND,  no rebound no guarding.      : crockett in place + 250 cc yellow urine with some small specs of blood in crockett tubing, small amount of urine around penis, no active leaking   Extremities: Venodynes in place. No LE edema bl

## 2023-02-20 NOTE — ED PROVIDER NOTE - NSFOLLOWUPINSTRUCTIONS_ED_ALL_ED_FT
You were seen in the Emergency Department for leakage around urethra after Goode catheter placement. We placed a larger size Goode catheter in.    - Please follow up with your urologist in 2-3 days.    - Be sure to return to the ED if you develop new or worsening symptoms.     - Specific signs and symptoms to be vigilant of: leaking of urine, Goode catheter issues (clotting, not draining, dislodgement), fever or chills, persistent or worsening abdominal/back/pelvic/flank pain, pain on urination, blood in the urine, cloudy urine, foul-smelling urine, increased frequency of urination, difficulty with urination. You were seen in the Emergency Department for leakage around urethra after Goode catheter placement. We placed a larger size Goode catheter (20 Lao) in.    - Please follow up with your urologist in 2-3 days.    - Be sure to return to the ED if you develop new or worsening symptoms.     - Specific signs and symptoms to be vigilant of: leaking of urine, Goode catheter issues (clotting, not draining, dislodgement), fever or chills, persistent or worsening abdominal/back/pelvic/flank pain, pain on urination, blood in the urine, cloudy urine, foul-smelling urine, increased frequency of urination, difficulty with urination.

## 2023-02-20 NOTE — ED PROVIDER NOTE - PROGRESS NOTE DETAILS
Walter: evaluated by urology. Bedside pocus showing balloon in place, decompressed bladder, no leakage around crockett. Will d/c with leg bag and urology follow up.

## 2023-02-20 NOTE — ED ADULT NURSE NOTE - OBJECTIVE STATEMENT
Patient A&Ox4, presents to the ED ambulatory from home c/o urinary catheter complications. Patient states "I don't know if its broken or leaking but something is wrong". Patient does not have any other complaints at this time. Patient denies pain or discomfort at this time. Patient catheter noted to have urine output as weak as leakage around th insertion site. Goode catheter to be replaced.

## 2023-02-20 NOTE — ED PROVIDER NOTE - DATE/TIME 1
CHIEF COMPLAINT:  Fall and left-sided weakness.



HISTORY OF PRESENT ILLNESS:  This patient is a 56-year-old male, who was admitted

here in March at which time, the patient experienced some right-sided weakness.  At

that time, the patient had an MRI of the brain indicating some right frontal and

parietal infarcts, which were old as well as new subcentimeter acute infarct of the

ventricle aspect of the cory on the right side as well.  He also had evidence of

small-vessel disease.  The patient from that point, had some right-sided weakness

and has essentially had paralysis in his right upper extremity resulting in some

contracture.  The patient reports that he was in his bed and rolled over thinking he

had adequate room where in fact he did not.  He fell to the floor.  He hit his right

forehead and had a laceration over his right eyebrow, but also noted significant

weakness on his left side as well.  The patient did subsequently present to the

emergency department.  However, he was well outside of the timeframe for any

potential intervention.  This apparently happened at about 2 o'clock in the morning

and he was found by his family later this morning, EMS was called.  He was

reportedly having some drooping and slurred speech. 



REVIEW OF SYSTEMS:  The patient denies any antecedent illness, fevers, chills.  He

denies any difficulty with speech or swallowing presently.  Normal bowel and bladder

functions.  All systems reviewed, all pertinent positives and negatives noted above. 



PAST MEDICAL HISTORY:  

1. Notable for ischemic CVA with residual right hemiparesis.

2. Hypertension.

3. Hyperlipidemia.

4. Diabetes mellitus.



PAST SURGICAL HISTORY:  

1. 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

2. Status post drainage of a neck abscess.



FAMILY HISTORY:  Notable for hypertension, diabetes.



SOCIAL HISTORY:  The patient lives in [place].  He is disabled.  Has a remote

history of marijuana use.  No alcohol use.  No tobacco.  No drugs.  He is full code

and his sister or his younger brother would be his surrogate decision makers should

that become necessary. 



CURRENT MEDICATIONS:  

1. Simvastatin 10 mg at bedtime.

2. Metformin 1000 mg b.i.d.

3. Glipizide 5 mg b.i.d.

4. Lisinopril 10 mg daily.  

5. Aspirin 325 one p.o. daily.

6. Pioglitazone 30 mg, one p.o. daily.



ALLERGIES:  NONE KNOWN.



PHYSICAL EXAMINATION:

VITAL SIGNS:  /74, pulse 77, respirations 15, temperature is 98.6, O2

saturation 100% on room air. 

GENERAL APPEARANCE:  Age-appropriate male.  He is in no distress.  He is awake,

alert, oriented, pleasant, and cooperative.  He is wearing a C-collar. 

HEENT:  The patient has a sutured laceration about 3 cm above the right eyebrow.  He

has no OP lesions.  Oral mucosa is pink and moist. 

NECK:  Neck exam limited with C-collar, has no significant tenderness to palpation. 

HEART:  Regular rate and rhythm without murmurs, gallops, or rubs. 

LUNGS:  Clear to auscultation bilaterally with good chest wall expansion and air

exchange.  No wheezes or rales. 

ABDOMEN:  Soft, nontender, and nondistended.  Positive bowel sounds.  No masses and

no organomegaly. 

EXTREMITIES:  Left BKA.  No edema. NEUROLOGICALLY:  The patient appears to be

cognitively intact.  His speech is a bit limited, because of the C-collar, although

it appears to be fairly normal.  He has contraction of the right upper extremity to

the fingers.  Has profound weakness to right lower extremity.  On the left, the

patient is unable to move his fingers.  He can flex and extend the wrist, but has

3/5 strength throughout the entire left and lower extremities. 



LABORATORY DATA:  White count 10.0, hemoglobin 12.0, platelets 184.  INR 1.0, PTT

29.2.  Sodium 138, potassium 4.5, chloride 109, CO2 20, BUN 12, creatinine 0.79,

glucose 119, calcium 8.7, total bilirubin 0.7, AST 15, ALT is 10, alkaline

phosphatase 40.  Troponin less than 0.01.  Albumin 4.1. 



IMAGIN. CT brain only shows the old findings as noted above with prior ischemic

infarction of the right frontal and parietal lobes with chronic white matter

ischemia. 

2. CT angiogram negative. 

3. CT of the C-spine negative.  

4. X-rays of the elbow and wrist are negative.



IMPRESSION AND PLAN:  

1. Acute left side weakness in a patient with a history of prior cerebrovascular

accidents.  The patient's prior MRIs indicated ischemic events of the right frontal

and parietal areas, which are more consistent with the patient's exam findings

today, although these apparently are new.  We will obtain an MRI, do a Stroke Team

consult, Neurology consult.  Given the patient's fall, we will also obtain MRI of

the C-spine. 

2. Fall injury with initial neck pain and keep him in the C-collar, have Trauma

service clear the patient. 

3. Laceration, right forehead sutured in the emergency department.

4. History of prior cerebrovascular accident with right hemiplegia.

5. Diabetes mellitus.  Diabetic diet, Accu-Cheks, sliding scale insulin.  We will

continue his home medications. 

6. Hypertension.  Continue with the Zestril and continue the patient's home Keppra

to identify the underlying indication for that.  It is possible that the patient

could have had some seizure activity resulting in some Momo's paralysis, although

there is nothing __________. 

7. Hyperlipidemia, continue simvastatin.







Job ID:  [id number] 20-Feb-2023 08:28

## 2023-02-20 NOTE — CONSULT NOTE ADULT - CONSULT REQUESTED DATE/TIME
20-Feb-2023 08:55 No new care gaps identified.  [facility] Embedded Care Gaps. Reference number: [id number]. 10/12/2022   3:54:11 PM CDT

## 2023-02-20 NOTE — ED PROVIDER NOTE - OBJECTIVE STATEMENT
82M hx of AF on Eliquis, BPH, HTN, HLD, chronic Goode for urinary incontinence, seen 2 days ago in the ED for a leaking Goode bag s/p replacement, re-presenting with a Goode issue (leaking around urethra). Denies fever, chills, nausea, vomiting, chest pain, dyspnea, flank or back pain.

## 2023-02-20 NOTE — CONSULT NOTE ADULT - ASSESSMENT
A/P: 82y  Male with chronic crockett catheter presented w/ crockett malfunction- upsized by ER team  -No active drainage around crockett, bladder scan <60cc urine in bladder  -Ditropan PRN  -Discharge from Urology perspective, f/u outpatient.  -D/w Dr. Mcbride

## 2023-03-06 NOTE — ED PROVIDER NOTE - NSICDXPASTMEDICALHX_GEN_ALL_CORE_FT
Spoke to pt to confirm appt for tomorrow and she was upset that she is not seeing Dr Salter. I told her she was seeing Yovana HUDDLESTON. She said no one told her she was seeing a NP. I apologized to her and told her the doc is on vacation. She said that Eugenio told her she was seeing the doctor. I told her she was prob added to the the Np due to his schedule is very very tight. She's still going to come but was really upset and wants to see Gaetano still.    PAST MEDICAL HISTORY:  Atrial fibrillation     BPH (Benign Prostatic Hypertrophy) laser 2010    H/O varicose veins     Hearing loss     HTN (hypertension)     Hypercholesteremia     Legally blind right eye    OA (osteoarthritis) of hip     Obesity

## 2023-03-06 NOTE — ED ADULT TRIAGE NOTE - CHIEF COMPLAINT QUOTE
Pt coming from home c/o urinary catheter leakage. Pt thinks hes had it in for approx. 2 weeks. - Allergies. No pertinent medical hx.

## 2023-03-06 NOTE — ED PROVIDER NOTE - DISCHARGE DATE
I added vitamin D.
[facility] called stating pt needs lab orders placed. Please advise and call pt once orders are placed.
06-Mar-2023

## 2023-03-06 NOTE — ED ADULT NURSE NOTE - NSICDXPASTSURGICALHX_GEN_ALL_CORE_FT
PAST SURGICAL HISTORY:  H/O colonoscopy     H/O cystoscopy TURP 2017    History of Cataract Surgery right eye 2010     Deep Sutures: 5-0 Monocryl

## 2023-03-06 NOTE — ED PROVIDER NOTE - PATIENT PORTAL LINK FT
You can access the FollowMyHealth Patient Portal offered by [facility] by registering at the following website: [email or web address]. By joining [facility]’s FollowMyHealth portal, you will also be able to view your health information using other applications (apps) compatible with our system.

## 2023-03-06 NOTE — ED ADULT NURSE NOTE - OBJECTIVE STATEMENT
Pt presents to ED c/o urinary catheter complications. Pt states he had a catheter placed here 2 weeks ago which is now leaking from the bag. Pt denies leaking around the insertion site. Bladder scan performed, 24 mLs in bladder, leg bag replaced, stat lock replaced, leg strap replaced. Pt without other complaints.

## 2023-03-06 NOTE — ED PROVIDER NOTE - OBJECTIVE STATEMENT
Pt states crockett bag is leaking, wet on leg. Crockett ok he thinks, no pain, no fever, no blood. Follows with Dr Godfrey, will follow up abdiaziz. No other complaints

## 2023-03-06 NOTE — ED ADULT NURSE NOTE - NSIMPLEMENTINTERV_GEN_ALL_ED
Implemented All Universal Safety Interventions:  [place] to call system. Call bell, personal items and telephone within reach. Instruct patient to call for assistance. Room bathroom lighting operational. Non-slip footwear when patient is off stretcher. Physically safe environment: no spills, clutter or unnecessary equipment. Stretcher in lowest position, wheels locked, appropriate side rails in place.

## 2023-05-27 NOTE — ED ADULT NURSE NOTE - NSFALLUNIVINTERV_ED_ALL_ED
Bed/Stretcher in lowest position, wheels locked, appropriate side rails in place/Call bell, personal items and telephone in reach/Instruct patient to call for assistance before getting out of bed/chair/stretcher/Non-slip footwear applied when patient is off stretcher/[place] to call system/Physically safe environment - no spills, clutter or unnecessary equipment/Purposeful proactive rounding/Room/bathroom lighting operational, light cord in reach

## 2023-05-27 NOTE — ED PROVIDER NOTE - PHYSICAL EXAMINATION
Constitutional: elderly male, NAD  Head: Normocephalic   Mouth: MMM  Cardiac: regular rate, S1 and S2  Resp: Lungs CTAB  GI: Abd s/nt/nd, no rebound or guarding.  Neuro: awake, alert, moving all extremities  Skin: No rashes   Ext: LLE swollen, erythematous, weeping. erythema extends to left knee, NVI

## 2023-05-27 NOTE — ED PROVIDER NOTE - NS ED ROS FT
Constitutional: No fever or chills  Eyes: No visual changes  HEENT: No throat pain  CV: No chest pain  Resp: No SOB no cough  GI: No abd pain, nausea or vomiting  : No dysuria  MSK: No musculoskeletal pain  Skin: +LLE cellulitis  Neuro: No headache

## 2023-05-27 NOTE — ED ADULT NURSE NOTE - OBJECTIVE STATEMENT
presenting w/family for oozing foul smelling redness to legs, w/HX of same. to be admitted for IV AB for cellulitis

## 2023-05-27 NOTE — ED STATDOCS - PROGRESS NOTE DETAILS
Jason Hatfield for Dr. Love: 83 y/o male with PMHx of cellulitis 12/2022, varicose veins, OA of hip, hypercholesteremia, HTN, CAD, Afib, BPH, colonoscopy, legally blind, hearing loss presents to the ED with CC of lower left leg cellulitis with associated pain x2 days. Patient additionally with leaking Goode bag. Denies fevers or any other symptoms at this time. NKDA. Will send to [facility] for further evaluation.

## 2023-05-27 NOTE — ED PROVIDER NOTE - OBJECTIVE STATEMENT
83 y/o male with PMHx of cellulitis 12/2022, varicose veins, OA of hip, hypercholesteremia, HTN, CAD, Afib, BPH, colonoscopy, legally blind presents to the ED c/o lower left leg cellulitis with associated pain for the past few days +oozing/redness. states he's constantly injured his leg. Pt is not diabetic. Per family at bedside, pt lives alone and has not taken his BP meds or Eliquis in the past 3-4 days. Also note that pt's crockett bag is leaking at this time. Denies any other sx. NKDA.

## 2023-05-27 NOTE — ED PROVIDER NOTE - CLINICAL SUMMARY MEDICAL DECISION MAKING FREE TEXT BOX
82 year old male with hx of afib on eliquis presents with left leg cellulitis. Will obtain sono to r/o DVT, XR, bloodwork, IV abx, and admission.

## 2023-05-27 NOTE — ED ADULT NURSE REASSESSMENT NOTE - NS ED NURSE REASSESS COMMENT FT1
steph care provided, pt Crockett bag was foul smelling, strapped to waistband. New 16 F crockett placed w/drainage bag w/o issue, UA UC sent

## 2023-05-28 NOTE — H&P ADULT - ASSESSMENT
83 y/o M w/ PMH of OA, dyslipidemia, HTN, CAD, a-fib, BPH, varicose veins, legally blind, chronic crockett, p/w LLE cellulitis    *LLE Cellulitis & UTI  -Vancomycin / cefepime   -F/u blood / urine cx  -Lactate = 1.1  -ID consult   -US LLE: negative for DVT, +Popliteal fossa cyst   -Chronic crockett changed in ED     *H/o dyslipidemia / HTN / CAD / a-fib / BPH / varicose veins / legally blind  -C/w home meds and f/u outpatient for further management if conditions remain stable during hospitalization     *DVT ppx   -On Eliquis 81 y/o M w/ PMH of OA, dyslipidemia, HTN, CAD, a-fib, BPH, varicose veins, legally blind, chronic crockett, p/w LLE cellulitis    *LLE Cellulitis & UTI  -Vancomycin / cefepime   -F/u blood / urine cx  -Lactate = 1.1  -ID consult   -US LLE: negative for DVT, +Popliteal fossa cyst (F/u outpatient ortho)   -Chronic crockett changed in ED     *H/o dyslipidemia / HTN / CAD / a-fib / BPH / varicose veins / legally blind  -C/w home meds and f/u outpatient for further management if conditions remain stable during hospitalization     *DVT ppx   -On Eliquis

## 2023-05-28 NOTE — H&P ADULT - CONVERSATION DETAILS
Patient states he is undecided regarding resuscitation status, and her HCP is daughter Ramona. Advance care planning time <30 mins

## 2023-05-28 NOTE — CONSULT NOTE ADULT - SUBJECTIVE AND OBJECTIVE BOX
Patient is a 82y old  Male who presents with a chief complaint of LLE cellulitis (28 May 2023 00:43)    HPI:  83 y/o M w/ PMH of OA, dyslipidemia, HTN, CAD, a-fib, BPH, varicose veins, legally blind, p/w LLE cellulitis. Patient states he noticed LLE symptoms about 2-3 days ago. Patient has erythema / warmth. Also c/o pain in LLE. Denies fever, chills, nausea, vomiting, abdominal pain, cough, runny nose, sore throat, CP, SOB. UA positive, has crockett - changed in ER. Was given vancomycin/cefepime.     PSH: as above  PMH: as above    Meds: per reconciliation sheet, noted below  MEDICATIONS  (STANDING):  cefepime  Injectable. 1000 milliGRAM(s) IV Push every 12 hours  furosemide    Tablet 40 milliGRAM(s) Oral daily  magnesium oxide 400 milliGRAM(s) Oral daily  metoprolol succinate  milliGRAM(s) Oral daily  potassium chloride    Tablet ER 20 milliEquivalent(s) Oral daily  tamsulosin 0.4 milliGRAM(s) Oral at bedtime  vancomycin  IVPB 1250 milliGRAM(s) IV Intermittent every 12 hours      Allergies    No Known Allergies    Intolerances      Social: no smoking, no alcohol, no illegal drugs; no recent travel, no exposure to TB  FAMILY HISTORY:  Family history of melanoma  father       no history of premature cardiovascular disease in first degree relatives    ROS: the patient denies fever, no chills, no HA, no dizziness, no sore throat, no blurry vision, no CP, no palpitations, no SOB, no cough, no abdominal pain, no diarrhea, no N/V, no dysuria, no leg pain, no claudication, no rash, no joint aches, no rectal pain or bleeding, no night sweats + LLE redness, swelling     All other systems reviewed and are negative    Vital Signs Last 24 Hrs  T(C): 36.6 (28 May 2023 08:55), Max: 36.7 (27 May 2023 23:02)  T(F): 97.8 (28 May 2023 08:55), Max: 98.1 (27 May 2023 23:02)  HR: 70 (28 May 2023 08:55) (70 - 129)  BP: 105/68 (28 May 2023 08:55) (105/68 - 143/88)  BP(mean): 76 (28 May 2023 08:55) (76 - 102)  RR: 18 (28 May 2023 08:55) (18 - 18)  SpO2: 95% (28 May 2023 08:55) (95% - 100%)    Parameters below as of 28 May 2023 08:55  Patient On (Oxygen Delivery Method): room air      Daily Height in cm: 175.26 (28 May 2023 00:45)    Daily     PE:  Constitutional: NAD   HEENT: NC/AT, EOMI, PERRLA, conjunctivae clear; ears and nose atraumatic; pharynx benign  Neck: supple; thyroid not palpable  Back: no tenderness  Respiratory: respiratory effort normal; clear to auscultation  Cardiovascular: S1S2 regular, no murmurs  Abdomen: soft, not tender, not distended, positive BS; liver and spleen WNL  Genitourinary: no suprapubic tenderness  Lymphatic: no LN palpable  Musculoskeletal: no muscle tenderness, no joint swelling or tenderness  Extremities: no pedal edema LLE erythema, warmth  Neurological/ Psychiatric: AxOx3, Judgement and insight normal;  moving all extremities  Skin: no rashes; no palpable lesions    Labs: all available labs reviewed                        12.5   5.71  )-----------( 183      ( 28 May 2023 06:19 )             38.7     -28    139  |  108  |  17  ----------------------------<  90  4.0   |  27  |  0.76    Ca    8.7      28 May 2023 06:19    TPro  6.4  /  Alb  2.7<L>  /  TBili  0.7  /  DBili  x   /  AST  16  /  ALT  14  /  AlkPhos  85       LIVER FUNCTIONS - ( 28 May 2023 06:19 )  Alb: 2.7 g/dL / Pro: 6.4 gm/dL / ALK PHOS: 85 U/L / ALT: 14 U/L / AST: 16 U/L / GGT: x           Urinalysis Basic - ( 27 May 2023 22:51 )    Color: Red / Appearance: very cloudy / S.015 / pH: x  Gluc: x / Ketone: Negative  / Bili: Negative / Urobili: Negative   Blood: x / Protein: 500 mg/dL / Nitrite: Positive   Leuk Esterase: Moderate / RBC: >50 /HPF / WBC 11-25 /HPF   Sq Epi: x / Non Sq Epi: x / Bacteria: Moderate          Radiology: all available radiological tests reviewed    Advanced directives addressed: full resuscitation

## 2023-05-28 NOTE — PROGRESS NOTE ADULT - SUBJECTIVE AND OBJECTIVE BOX
CC: 81 y/o M w/ PMH of OA, dyslipidemia, HTN, CAD, a-fib, BPH, varicose veins, legally blind, p/w LLE cellulitis. Patient states he noticed LLE symptoms about 2-3 days ago. Patient has erythema / warmth. Also c/o pain in LLE.     5/28 - no cp palps sob abdo pain; has chronic crockett     Vital Signs Last 24 Hrs  T(F): 97.7 (28 May 2023 15:30), Max: 98.1 (27 May 2023 23:02)  HR: 70 (28 May 2023 15:30) (70 - 74)  BP: 118/75 (28 May 2023 15:30) (105/68 - 143/88)  BP(mean): 76 (28 May 2023 08:55) (76 - 102)  RR: 18 (28 May 2023 15:30) (18 - 18)  SpO2: 96% (28 May 2023 15:30) (95% - 100%)  Constitutional: NAD, awake and alert, conversant   HEENT: PERR, EOMI  Neck: Soft and supple,  No JVD  Respiratory: Breath sounds are clear bilaterally, No wheezing, rales or rhonchi  Cardiovascular: S1 and S2, regular rate and rhythm, no Murmurs  Gastrointestinal: Bowel Sounds present, soft, nontender, nondistended  Extremities: No peripheral edema  Vascular: 2+ peripheral pulses  Neurological: A/O x 3, no focal deficits  Musculoskeletal: 5/5 strength b/l upper and lower extremities  Skin: LLE erythema     MEDICATIONS:  MEDICATIONS  (STANDING):  cefepime   IVPB 2000 milliGRAM(s) IV Intermittent every 12 hours  furosemide    Tablet 40 milliGRAM(s) Oral daily  magnesium oxide 400 milliGRAM(s) Oral daily  metoprolol succinate  milliGRAM(s) Oral daily  potassium chloride    Tablet ER 20 milliEquivalent(s) Oral daily  tamsulosin 0.4 milliGRAM(s) Oral at bedtime  vancomycin  IVPB 1250 milliGRAM(s) IV Intermittent every 12 hours      LABS: All Labs Reviewed:                     12.5   5.71  )-----------( 183      ( 28 May 2023 06:19 )             38.7  139  |  108  |  17  ----------------------------<  90  4.0   |  27  |  0.76  Ca    8.7      28 May 2023 06:19  TPro  6.4  /  Alb  2.7<L>  /  TBili  0.7  /  DBili  x   /  AST  16  /  ALT  14  /  AlkPhos  85  05-28  PT/INR - ( 28 May 2023 06:19 )   PT: 14.3 sec;   INR: 1.23 ratio      RADIOLOGY/EKG:  < from: US Duplex Venous Lower Ext Ltd, Left (05.27.23 @ 19:59) >  No evidence of left lower extremity deep venous thrombosis.  LEFT popliteal fossa cyst.    < from: Xray Tibia + Fibula 2 Views, Left (05.27.23 @ 20:56) >  IMPRESSION: Normal tibia and fibula. No evidence of subcutaneous air.

## 2023-05-28 NOTE — H&P ADULT - NSHPLANGLIMITEDENGLISH_GEN_A_CORE
Currently euvolemic on exam.  Last echo was in 2018 and EF was 20%.  Repeat echo is pending.  Status post ICD   No

## 2023-05-28 NOTE — PROGRESS NOTE ADULT - ASSESSMENT
83 y/o M w/ PMH of OA, dyslipidemia, HTN, CAD, a-fib, BPH, varicose veins, legally blind, chronic crockett, p/w LLE cellulitis    *LLE Cellulitis & UTI  -Vancomycin / cefepime   -F/u blood / urine cx  -ID consult   -US LLE: negative for DVT, +Popliteal fossa cyst (F/u outpatient ortho)   -Chronic crockett changed in ED     *H/o dyslipidemia / HTN / CAD / a-fib / BPH / varicose veins / legally blind  -C/w home meds and f/u outpatient for further management if conditions remain stable during hospitalization     *DVT ppx   -On Eliquis    DISPO - home when ready, lives alone,

## 2023-05-28 NOTE — CONSULT NOTE ADULT - ASSESSMENT
81 y/o M w/ PMH of OA, dyslipidemia, HTN, CAD, a-fib, BPH, varicose veins, legally blind, p/w LLE cellulitis. Patient states he noticed LLE symptoms about 2-3 days ago. Patient has erythema / warmth. Also c/o pain in LLE. Denies fever, chills, nausea, vomiting, abdominal pain, cough, runny nose, sore throat, CP, SOB. UA positive, has crockett - changed in ER. Was given vancomycin/cefepime.     1. LLE cellulitis. Pyuria. Possible UTI. BPH. Urinary retention with crockett.  - imaging reviewed  - f/u urine cx, blood cx   - on vancomycin adjust dose to [id number] check trough prior to 4th dose  - on cefepime adjust does to [id number]  - continue with antibiotic coverage  - monitor temps  - tolerating abx well so far; no side effects noted  - reason for abx use and side effects reviewed with patient  - supportive care  - fu cbc    2. other issues - care per medicine

## 2023-05-28 NOTE — H&P ADULT - HISTORY OF PRESENT ILLNESS
83 y/o M w/ PMH of OA, dyslipidemia, HTN, CAD, a-fib, BPH, varicose veins, legally blind, p/w LLE cellulitis. Patient states he noticed LLE symptoms about 2-3 days ago. Patient has erythema / warmth. Also c/o pain in LLE. Denies fever, chills, nausea, vomiting, abdominal pain, cough, runny nose, sore throat, CP, SOB       PSH: Denies     Social Hx: Denies tobacco / etoh / drugs     Family Hx: Deneis h/o pertinent family hx in first degree relatives

## 2023-05-28 NOTE — PATIENT PROFILE ADULT - BILL PAYMENT
no

## 2023-05-28 NOTE — PATIENT PROFILE ADULT - FALL HARM RISK - RISK INTERVENTIONS
yes
Assistance OOB with selected safe patient handling equipment/Assistance with ambulation/Communicate Fall Risk and Risk Factors to all staff, patient, and family/Discuss with provider need for PT consult/Monitor gait and stability/Reinforce activity limits and safety measures with patient and family/Visual Cue: Yellow wristband/Bed in lowest position, wheels locked, appropriate side rails in place/Call bell, personal items and telephone in reach/Instruct patient to call for assistance before getting out of bed or chair/Non-slip footwear when patient is out of bed/[place] to call system/Physically safe environment - no spills, clutter or unnecessary equipment/Purposeful Proactive Rounding/Room/bathroom lighting operational, light cord in reach

## 2023-05-29 NOTE — DISCHARGE NOTE NURSING/CASE MANAGEMENT/SOCIAL WORK - NSPROEXTENSIONSOFSELF_GEN_A_NUR
cell phone, clothes, , wallet with credit cards in it and cash. cell phone, clothes, , wallet with credit cards in it and cash./walker

## 2023-05-29 NOTE — PROGRESS NOTE ADULT - SUBJECTIVE AND OBJECTIVE BOX
Date of service: 23 @ 20:27    ROS: no fever or chills; denies dizziness, no HA, no SOB or cough, no abdominal pain, no diarrhea or constipation; no dysuria, no urinary frequency, no legs pain, no rashes    MEDICATIONS  (STANDING):  apixaban 5 milliGRAM(s) Oral every 12 hours  furosemide    Tablet 40 milliGRAM(s) Oral daily  magnesium oxide 400 milliGRAM(s) Oral daily  meropenem  IVPB 1000 milliGRAM(s) IV Intermittent once  meropenem  IVPB      metoprolol succinate  milliGRAM(s) Oral daily  potassium chloride    Tablet ER 20 milliEquivalent(s) Oral daily  tamsulosin 0.4 milliGRAM(s) Oral at bedtime  vancomycin  IVPB 1250 milliGRAM(s) IV Intermittent every 12 hours    Vital Signs Last 24 Hrs  T(C): 36.4 (29 May 2023 16:06), Max: 36.7 (28 May 2023 21:01)  T(F): 97.5 (29 May 2023 16:06), Max: 98 (28 May 2023 21:01)  HR: 64 (29 May 2023 16:06) (62 - 73)  BP: 105/59 (29 May 2023 16:06) (105/59 - 116/73)  BP(mean): 68 (29 May 2023 16:06) (68 - 68)  RR: 20 (29 May 2023 16:06) (18 - 20)  SpO2: 100% (29 May 2023 16:06) (94% - 100%)    Parameters below as of 29 May 2023 16:06  Patient On (Oxygen Delivery Method): room air            PE:  Constitutional: NAD   HEENT: NC/AT, EOMI, PERRLA, conjunctivae clear; ears and nose atraumatic; pharynx benign  Neck: supple; thyroid not palpable  Back: no tenderness  Respiratory: respiratory effort normal; clear to auscultation  Cardiovascular: S1S2 regular, no murmurs  Abdomen: soft, not tender, not distended, positive BS; liver and spleen WNL  Genitourinary: no suprapubic tenderness  Lymphatic: no LN palpable  Musculoskeletal: no muscle tenderness, no joint swelling or tenderness  Extremities: no pedal edema LLE erythema, warmth  Neurological/ Psychiatric: AxOx3, Judgement and insight normal;  moving all extremities  Skin: no rashes; no palpable lesions    Labs: all available labs reviewed                        12.5   5.71  )-----------( 183      ( 28 May 2023 06:19 )             38.7         139  |  108  |  17  ----------------------------<  90  4.0   |  27  |  0.76    Ca    8.7      28 May 2023 06:19    TPro  6.4  /  Alb  2.7<L>  /  TBili  0.7  /  DBili  x   /  AST  16  /  ALT  14  /  AlkPhos  85       LIVER FUNCTIONS - ( 28 May 2023 06:19 )  Alb: 2.7 g/dL / Pro: 6.4 gm/dL / ALK PHOS: 85 U/L / ALT: 14 U/L / AST: 16 U/L / GGT: x           Urinalysis Basic - ( 27 May 2023 22:51 )    Color: Red / Appearance: very cloudy / S.015 / pH: x  Gluc: x / Ketone: Negative  / Bili: Negative / Urobili: Negative   Blood: x / Protein: 500 mg/dL / Nitrite: Positive   Leuk Esterase: Moderate / RBC: >50 /HPF / WBC 11-25 /HPF   Sq Epi: x / Non Sq Epi: x / Bacteria: Moderate          Radiology: all available radiological tests reviewed    Advanced directives addressed: full resuscitation Date of service: 05-29-23 @ 20:27    pt seen and examined  LLE less redness, tenderness  crockett catheter in place  no fevers    ROS: no fever or chills; denies dizziness, no HA, no SOB or cough, no abdominal pain, no diarrhea or constipation; no legs pain, no rashes    MEDICATIONS  (STANDING):  apixaban 5 milliGRAM(s) Oral every 12 hours  furosemide    Tablet 40 milliGRAM(s) Oral daily  magnesium oxide 400 milliGRAM(s) Oral daily  meropenem  IVPB 1000 milliGRAM(s) IV Intermittent once  meropenem  IVPB      metoprolol succinate  milliGRAM(s) Oral daily  potassium chloride    Tablet ER 20 milliEquivalent(s) Oral daily  tamsulosin 0.4 milliGRAM(s) Oral at bedtime  vancomycin  IVPB 1250 milliGRAM(s) IV Intermittent every 12 hours    Vital Signs Last 24 Hrs  T(C): 36.4 (29 May 2023 16:06), Max: 36.7 (28 May 2023 21:01)  T(F): 97.5 (29 May 2023 16:06), Max: 98 (28 May 2023 21:01)  HR: 64 (29 May 2023 16:06) (62 - 73)  BP: 105/59 (29 May 2023 16:06) (105/59 - 116/73)  BP(mean): 68 (29 May 2023 16:06) (68 - 68)  RR: 20 (29 May 2023 16:06) (18 - 20)  SpO2: 100% (29 May 2023 16:06) (94% - 100%)    Parameters below as of 29 May 2023 16:06  Patient On (Oxygen Delivery Method): room air    PE:  Constitutional: NAD   HEENT: NC/AT, EOMI, PERRLA, conjunctivae clear; ears and nose atraumatic; pharynx benign  Neck: supple; thyroid not palpable  Back: no tenderness  Respiratory: respiratory effort normal; clear to auscultation  Cardiovascular: S1S2 regular, no murmurs  Abdomen: soft, not tender, not distended, positive BS; liver and spleen WNL  Genitourinary: no suprapubic tenderness  Lymphatic: no LN palpable  Musculoskeletal: no muscle tenderness, no joint swelling or tenderness  Extremities: no pedal edema LLE erythema, warmth  Neurological/ Psychiatric: AxOx3, Judgement and insight normal;  moving all extremities  Skin: no rashes; no palpable lesions    Labs: all available labs reviewed                                   12.5   5.71  )-----------( 183      ( 28 May 2023 06:19 )             38.7     05-28    139  |  108  |  17  ----------------------------<  90  4.0   |  27  |  0.76    Ca    8.7      28 May 2023 06:19    TPro  6.4  /  Alb  2.7<L>  /  TBili  0.7  /  DBili  x   /  AST  16  /  ALT  14  /  AlkPhos  85  05-28       Vancomycin Level, Trough: 11.5 ug/mL (05-29 @ 09:42)      Culture - Urine (05.28.23 @ 00:43)   Specimen Source: Clean Catch Clean Catch (Midstream)  Culture Results:   >100,000 CFU/ml Klebsiella oxytoca/Raoultella ornithinolytica  Culture - Blood (05.27.23 @ 20:57)   Specimen Source: .Blood None  Culture Results:   No growth to date.          Radiology: all available radiological tests reviewed    Advanced directives addressed: full resuscitation

## 2023-05-29 NOTE — PROGRESS NOTE ADULT - ASSESSMENT
81 y/o M w/ PMH of OA, dyslipidemia, HTN, CAD, a-fib, BPH, varicose veins, legally blind, chronic crockett, p/w LLE cellulitis    *LLE Cellulitis & UTI  -Vancomycin / cefepime   -F/u blood / urine cx  -US LLE: negative for DVT, +Popliteal fossa cyst (F/u outpatient ortho)   -Chronic crockett changed in ED     *H/o dyslipidemia / HTN / CAD / a-fib / BPH / varicose veins / legally blind  -C/w home meds and f/u outpatient for further management if conditions remain stable during hospitalization     *DVT ppx   -On Eliquis    DISPO - home when ready, lives alone,    81 y/o M w/ PMH of OA, dyslipidemia, HTN, CAD, a-fib, BPH, varicose veins, legally blind, chronic crockett, p/w LLE cellulitis    *LLE Cellulitis & UTI  -F/u blood cx NTD  / urine cx - GNR: dc vanco cefepime and start meropenem   -US LLE: negative for DVT, +Popliteal fossa cyst (F/u outpatient ortho)   -Chronic crockett changed in ED     *H/o dyslipidemia / HTN / CAD / a-fib / BPH / varicose veins / legally blind  -C/w home meds and f/u outpatient for further management if conditions remain stable during hospitalization     *DVT ppx   -On Eliquis    DISPO - home when ready, lives alone,

## 2023-05-29 NOTE — DISCHARGE NOTE NURSING/CASE MANAGEMENT/SOCIAL WORK - NSDCVIVACCINE_GEN_ALL_CORE_FT
Tdap; 12-Jan-2021 16:41; Viviana Alvarez (NINI); [employer]; [id number] (Exp. Date: 31-Jul-2022); IntraMuscular; Deltoid Left.; 0.5 milliLiter(s); VIS (VIS Published: 09-May-2013, VIS Presented: 12-Jan-2021);

## 2023-05-29 NOTE — PROGRESS NOTE ADULT - ASSESSMENT
83 y/o M w/ PMH of OA, dyslipidemia, HTN, CAD, a-fib, BPH, varicose veins, legally blind, p/w LLE cellulitis. Patient states he noticed LLE symptoms about 2-3 days ago. Patient has erythema / warmth. Also c/o pain in LLE. Denies fever, chills, nausea, vomiting, abdominal pain, cough, runny nose, sore throat, CP, SOB. UA positive, has crockett - changed in ER. Was given vancomycin/cefepime.     1. LLE cellulitis. Pyuria. Possible UTI. BPH. Urinary retention with crockett.  - imaging reviewed  - f/u urine cx, blood cx   - on vancomycin adjust dose to [id number] check trough prior to 4th dose  - on cefepime adjust does to [id number]  - continue with antibiotic coverage  - monitor temps  - tolerating abx well so far; no side effects noted  - reason for abx use and side effects reviewed with patient  - supportive care  - fu cbc    2. other issues - care per medicine  83 y/o M w/ PMH of OA, dyslipidemia, HTN, CAD, a-fib, BPH, varicose veins, legally blind, p/w LLE cellulitis. Patient states he noticed LLE symptoms about 2-3 days ago. Patient has erythema / warmth. Also c/o pain in LLE. Denies fever, chills, nausea, vomiting, abdominal pain, cough, runny nose, sore throat, CP, SOB. UA positive, has crockett - changed in ER. Was given vancomycin/cefepime.     1. LLE cellulitis. Pyuria. Possible UTI. BPH. Urinary retention with crockett.  - imaging reviewed  - f/u urine cx- growing KLOX f/u sensitivities, blood cx no growth  - on vancomycin adjust dose to [id number] check trough prior to 4th dose #2-3  - on cefepime adjust does to [id number] #2-3; changed to merrem   - continue with antibiotic coverage  - monitor temps  - tolerating abx well so far; no side effects noted  - reason for abx use and side effects reviewed with patient  - supportive care  - fu cbc    2. other issues - care per medicine  81 y/o M w/ PMH of OA, dyslipidemia, HTN, CAD, a-fib, BPH, varicose veins, legally blind, p/w LLE cellulitis. Patient states he noticed LLE symptoms about 2-3 days ago. Patient has erythema / warmth. Also c/o pain in LLE. Denies fever, chills, nausea, vomiting, abdominal pain, cough, runny nose, sore throat, CP, SOB. UA positive, has crockett - changed in ER. Was given vancomycin/cefepime.     1. LLE cellulitis. Pyuria. Possible UTI. BPH. Urinary retention with crockett.  - imaging reviewed  - f/u urine cx- growing KLOX f/u sensitivities, blood cx no growth  - on vancomycin adjust dose to [id number] check trough prior to 4th dose #2-3  - on cefepime [id number] #2-3; changed to merrem   - continue with antibiotic coverage  - monitor temps  - tolerating abx well so far; no side effects noted  - reason for abx use and side effects reviewed with patient  - supportive care  - fu cbc    2. other issues - care per medicine

## 2023-05-29 NOTE — INPATIENT CERTIFICATION FOR MEDICARE PATIENTS - PHYSICIAN CONCUR
Ventricular Rate : 49  Atrial Rate : 49  P-R Interval : 139  QRS Duration : 79  Q-T Interval : 411  QTC Calculation(Bezet) : 371  P Axis : 48  R Axis : 40  T Axis : 6  Diagnosis : Sinus bradycardia    Confirmed by Adriano Wen ([id number]) on 12/3/2018 2:33:01 PM   I concur with the Admission Order and I certify that services are provided in accordance with Section 42 CFR § 412.3

## 2023-05-29 NOTE — DISCHARGE NOTE NURSING/CASE MANAGEMENT/SOCIAL WORK - PATIENT PORTAL LINK FT
You can access the FollowMyHealth Patient Portal offered by [facility] by registering at the following website: [email or web address]. By joining [employer]’s FollowMyHealth portal, you will also be able to view your health information using other applications (apps) compatible with our system.

## 2023-05-29 NOTE — DISCHARGE NOTE NURSING/CASE MANAGEMENT/SOCIAL WORK - NSDCPEFALRISK_GEN_ALL_CORE
For information on Fall & Injury Prevention, visit: [email or web address] OR  [email or web address] OR  https://www.cdc.gov/steadi/patient.html

## 2023-05-29 NOTE — PROGRESS NOTE ADULT - SUBJECTIVE AND OBJECTIVE BOX
CC: 81 y/o M w/ PMH of OA, dyslipidemia, HTN, CAD, a-fib, BPH, varicose veins, legally blind, p/w LLE cellulitis. Patient states he noticed LLE symptoms about 2-3 days ago. Patient has erythema / warmth. Also c/o pain in LLE.     5/28 - no cp palps sob abdo pain; has chronic crockett   5/29 - seen  in AM - hungry. no cp palps sob abdo pain; sitting up in chair    Vital Signs Last 24 Hrs  T(F): 97.5 (29 May 2023 16:06), Max: 98 (28 May 2023 21:01)  HR: 64 (29 May 2023 16:06) (62 - 73)  BP: 105/59 (29 May 2023 16:06) (105/59 - 116/73)  BP(mean): 68 (29 May 2023 16:06) (68 - 68)  RR: 20 (29 May 2023 16:06) (18 - 20)  SpO2: 100% (29 May 2023 16:06) (94% - 100%)/RA   Constitutional: NAD, awake and alert, conversant   HEENT: PERR, EOMI  Neck: Soft and supple,  No JVD  Respiratory: Breath sounds are clear bilaterally, No wheezing, rales or rhonchi  Cardiovascular: S1 and S2, regular rate and rhythm, no Murmurs  Gastrointestinal: Bowel Sounds present, soft, nontender, nondistended  Extremities: No peripheral edema  Vascular: 2+ peripheral pulses  Neurological: A/O x 3, no focal deficits  Musculoskeletal: 5/5 strength b/l upper and lower extremities  Skin: LLE erythema     RADIOLOGY/EKG:  < from: US Duplex Venous Lower Ext Ltd, Left (05.27.23 @ 19:59) >  No evidence of left lower extremity deep venous thrombosis.  LEFT popliteal fossa cyst.    < from: Xray Tibia + Fibula 2 Views, Left (05.27.23 @ 20:56) >  IMPRESSION: Normal tibia and fibula. No evidence of subcutaneous air.    LABS: All Labs Reviewed:                        12.5 5.71  )-----------( 183      ( 28 May 2023 06:19 )             38.7     05-28    139  |  108  |  17  ----------------------------<  90  4.0   |  27  |  0.76      MEDS:   apixaban 5 milliGRAM(s) Oral every 12 hours  cefepime   IVPB 2000 milliGRAM(s) IV Intermittent every 12 hours  furosemide    Tablet 40 milliGRAM(s) Oral daily  magnesium oxide 400 milliGRAM(s) Oral daily  metoprolol succinate  milliGRAM(s) Oral daily  oxyCODONE    IR 5 milliGRAM(s) Oral every 6 hours PRN  potassium chloride    Tablet ER 20 milliEquivalent(s) Oral daily  tamsulosin 0.4 milliGRAM(s) Oral at bedtime  vancomycin  IVPB 1250 milliGRAM(s) IV Intermittent every 12 hours

## 2023-05-30 NOTE — PROGRESS NOTE ADULT - SUBJECTIVE AND OBJECTIVE BOX
CC: 83 y/o M w/ PMH of OA, dyslipidemia, HTN, CAD, a-fib, BPH, varicose veins, legally blind, p/w LLE cellulitis. Patient states he noticed LLE symptoms about 2-3 days ago. Patient has erythema / warmth. Also c/o pain in LLE.     5/28 - no cp palps sob abdo pain; has chronic crockett   5/29 - seen  in AM - hungry. no cp palps sob abdo pain; sitting up in chair    Vital Signs Last 24 Hrs  T(F): 97.5 (29 May 2023 16:06), Max: 98 (28 May 2023 21:01)  HR: 64 (29 May 2023 16:06) (62 - 73)  BP: 105/59 (29 May 2023 16:06) (105/59 - 116/73)  BP(mean): 68 (29 May 2023 16:06) (68 - 68)  RR: 20 (29 May 2023 16:06) (18 - 20)  SpO2: 100% (29 May 2023 16:06) (94% - 100%)/RA   Constitutional: NAD, awake and alert, conversant   HEENT: PERR, EOMI  Neck: Soft and supple,  No JVD  Respiratory: Breath sounds are clear bilaterally, No wheezing, rales or rhonchi  Cardiovascular: S1 and S2, regular rate and rhythm, no Murmurs  Gastrointestinal: Bowel Sounds present, soft, nontender, nondistended  Extremities: No peripheral edema  Vascular: 2+ peripheral pulses  Neurological: A/O x 3, no focal deficits  Musculoskeletal: 5/5 strength b/l upper and lower extremities  Skin: LLE erythema     RADIOLOGY/EKG:  < from: US Duplex Venous Lower Ext Ltd, Left (05.27.23 @ 19:59) >  No evidence of left lower extremity deep venous thrombosis.  LEFT popliteal fossa cyst.    < from: Xray Tibia + Fibula 2 Views, Left (05.27.23 @ 20:56) >  IMPRESSION: Normal tibia and fibula. No evidence of subcutaneous air.    LABS: All Labs Reviewed:                        12.5 5.71  )-----------( 183      ( 28 May 2023 06:19 )             38.7     05-28    139  |  108  |  17  ----------------------------<  90  4.0   |  27  |  0.76      MEDS:   apixaban 5 milliGRAM(s) Oral every 12 hours  cefepime   IVPB 2000 milliGRAM(s) IV Intermittent every 12 hours  furosemide    Tablet 40 milliGRAM(s) Oral daily  magnesium oxide 400 milliGRAM(s) Oral daily  metoprolol succinate  milliGRAM(s) Oral daily  oxyCODONE    IR 5 milliGRAM(s) Oral every 6 hours PRN  potassium chloride    Tablet ER 20 milliEquivalent(s) Oral daily  tamsulosin 0.4 milliGRAM(s) Oral at bedtime  vancomycin  IVPB 1250 milliGRAM(s) IV Intermittent every 12 hours           CC:  leg edema, redness, pain         83 y/o M w/ PMH of CAD, A fib on Eliquis, BPH, urinary retention s/p Goode ,  OA,  HTN, HLD,  varicose veins, legally blind admitted on 5/27/23 with  LLE cellulitis. Patient states he noticed LLE symptoms about 2-3 days ago. Patient has erythema / warmth. Also c/o pain in LLE.     5/30 - pt seen and examined, + leg edema, redness, pain, difficulties ambulating, denies cp, dyspnea, abdominal pain , tolerating po intake    Review of system- Rest of the review of system are negative except mentioned in HPI    Vital sings reviewed for last 24 h  T(C): 36.9 (05-30-23 @ 21:34), Max: 36.9 (05-30-23 @ 21:34)  T(F): 98.4 (05-30-23 @ 21:34), Max: 98.4 (05-30-23 @ 21:34)  HR: 62 (05-30-23 @ 21:34) (61 - 64)  BP: 104/58 (05-30-23 @ 21:34) (104/58 - 115/62)  RR: 18 (05-30-23 @ 21:34) (18 - 18)  SpO2: 97% (05-30-23 @ 21:34) (95% - 97%)  Wt(kg): --  Daily     Daily   CAPILLARY BLOOD GLUCOSE      Physical exam :   Constitutional: NAD, awake and alert, conversant   HEENT: PERR, EOMI  Neck: Soft and supple,  No JVD  Respiratory: Breath sounds are clear bilaterally, No wheezing, rales or rhonchi  Cardiovascular: S1 and S2, regular rate and rhythm, no Murmurs  Gastrointestinal: Bowel Sounds present, soft, nontender, nondistended  Extremities: + 2 LE  peripheral edema  Vascular: + peripheral pulses  Neurological: A/O x 3, no focal deficits  Musculoskeletal: 5/5 strength b/l upper and lower extremities  Skin: LLE erythema  with open weeping wound     All labs radiology and other studies reviewed and interpreted :     05-30    139  |  106  |  16  ----------------------------<  88  3.9   |  27  |  0.72    Ca    8.8      30 May 2023 06:41  Phos  3.5     05-30  Mg     2.2     05-30                          13.1   5.03  )-----------( 190      ( 30 May 2023 06:41 )             40.0     12 Lead ECG (05.27.23 @ 20:24) >  Ventricular Rate 77 BPM   Atrial fibrillation  Right bundle branch block  Abnormal ECG  When compared with ECG of 29-DEC-2022 16:40,  QRS duration has increased      US Duplex Arterial Lower Ext Compl, Bilateral (05.30.23 @ 14:59) >  IMPRESSION:  *  No evidence of occlusive disease but abnormal dampened and monophasic   flow below the knees suggest infrapopliteal disease.     US Duplex Venous Lower Ext Ltd, Left (05.27.23 @ 19:59) >  No evidence of left lower extremity deep venous thrombosis.  LEFT popliteal fossa cyst.     Xray Tibia + Fibula 2 Views, Left (05.27.23 @ 20:56) >  IMPRESSION: Normal tibia and fibula. No evidence of subcutaneous air.      MEDICATIONS  (STANDING):  apixaban 5 milliGRAM(s) Oral every 12 hours  cefepime   IVPB 2000 milliGRAM(s) IV Intermittent every 12 hours  furosemide    Tablet 40 milliGRAM(s) Oral daily  magnesium oxide 400 milliGRAM(s) Oral daily  metoprolol succinate  milliGRAM(s) Oral daily  potassium chloride    Tablet ER 20 milliEquivalent(s) Oral daily  tamsulosin 0.4 milliGRAM(s) Oral at bedtime  vancomycin  IVPB 1250 milliGRAM(s) IV Intermittent every 12 hours    MEDICATIONS  (PRN):  oxyCODONE    IR 5 milliGRAM(s) Oral every 6 hours PRN Moderate Pain (4 - 6)

## 2023-05-30 NOTE — PROGRESS NOTE ADULT - SUBJECTIVE AND OBJECTIVE BOX
Date of service: 05-30-23 @ 11:23    pt seen and examined  LLE improving  feels nii  crockett catheter in place  no fevers    ROS: no fever or chills; denies dizziness, no HA, no SOB or cough, no abdominal pain, no diarrhea or constipation; no legs pain, no rashes    MEDICATIONS  (STANDING):  apixaban 5 milliGRAM(s) Oral every 12 hours  cefepime   IVPB 2000 milliGRAM(s) IV Intermittent every 12 hours  furosemide    Tablet 40 milliGRAM(s) Oral daily  magnesium oxide 400 milliGRAM(s) Oral daily  metoprolol succinate  milliGRAM(s) Oral daily  potassium chloride    Tablet ER 20 milliEquivalent(s) Oral daily  tamsulosin 0.4 milliGRAM(s) Oral at bedtime  vancomycin  IVPB 1250 milliGRAM(s) IV Intermittent every 12 hours    Vital Signs Last 24 Hrs  T(C): 36.7 (30 May 2023 09:02), Max: 36.7 (30 May 2023 09:02)  T(F): 98.1 (30 May 2023 09:02), Max: 98.1 (30 May 2023 09:02)  HR: 61 (30 May 2023 09:02) (61 - 68)  BP: 115/62 (30 May 2023 09:02) (105/59 - 115/62)  BP(mean): 68 (29 May 2023 16:06) (68 - 68)  RR: 18 (30 May 2023 09:02) (18 - 20)  SpO2: 95% (30 May 2023 09:02) (95% - 100%)    Parameters below as of 30 May 2023 09:02  Patient On (Oxygen Delivery Method): room air    PE:  Constitutional: NAD   HEENT: NC/AT, EOMI, PERRLA, conjunctivae clear; ears and nose atraumatic; pharynx benign  Neck: supple; thyroid not palpable  Back: no tenderness  Respiratory: respiratory effort normal; clear to auscultation  Cardiovascular: S1S2 regular, no murmurs  Abdomen: soft, not tender, not distended, positive BS; liver and spleen WNL  Genitourinary: no suprapubic tenderness  Lymphatic: no LN palpable  Musculoskeletal: no muscle tenderness, no joint swelling or tenderness  Extremities: no pedal edema LLE erythema, warmth  Neurological/ Psychiatric: AxOx3, Judgement and insight normal;  moving all extremities  Skin: no rashes; no palpable lesions    Labs: all available labs reviewed                                   13.1   5.03  )-----------( 190      ( 30 May 2023 06:41 )             40.0     05-30    139  |  106  |  16  ----------------------------<  88  3.9   |  27  |  0.72    Ca    8.8      30 May 2023 06:41  Phos  3.5     05-30  Mg     2.2     05-30         Vancomycin Level, Trough: 11.5 ug/mL (05-29 @ 09:42)    Culture - Urine (05.28.23 @ 00:43)   Specimen Source: Clean Catch Clean Catch (Midstream)  Culture Results:   >100,000 CFU/ml Klebsiella oxytoca/Raoultella ornithinolytica  Culture - Blood (05.27.23 @ 20:57)   Specimen Source: .Blood None  Culture Results:   No growth to date.          Radiology: all available radiological tests reviewed    Advanced directives addressed: full resuscitation

## 2023-05-30 NOTE — PROGRESS NOTE ADULT - ASSESSMENT
81 y/o M w/ PMH of OA, dyslipidemia, HTN, CAD, a-fib, BPH, varicose veins, legally blind, chronic crockett, p/w LLE cellulitis    *LLE Cellulitis & UTI  -F/u blood cx NTD  / urine cx - GNR: dc vanco cefepime and start meropenem   -US LLE: negative for DVT, +Popliteal fossa cyst (F/u outpatient ortho)   -Chronic crockett changed in ED     *H/o dyslipidemia / HTN / CAD / a-fib / BPH / varicose veins / legally blind  -C/w home meds and f/u outpatient for further management if conditions remain stable during hospitalization     *DVT ppx   -On Eliquis    DISPO - home when ready, lives alone,       83 y/o M w/ PMH of CAD, A fib on Eliquis, BPH, urinary retention s/p Crockett ,  OA,  HTN, HLD,  varicose veins, legally blind admitted on 5/27/23 with  LLE cellulitis. Patient states he noticed LLE symptoms about 2-3 days ago. Patient has erythema / warmth. Also c/o pain in LLE.       LLE Cellulitis   -F/u blood cx NTD   -c/w  vanco cefepime , dc  meropenem   -US LLE: negative for DVT, +Popliteal fossa cyst (F/u outpatient ortho)   - arterial doppler LE - no stenosis  - elevate legs  -Chronic crockett changed in ED     Klebsiella oxytoca  UTI associated with Crockett,  Urinary retention BPH   - c/w ceftriaxone   - urine cx noted     Chronic A fib  - c/w Eliquis, BB     Leg edema ,  varicose veins   - c/w lasix, daily weights  - check BNP     Legally blind  - supportive care    DVT ppx  -On Eliquis    DISPO - home when ready, lives alone,

## 2023-05-30 NOTE — DIETITIAN INITIAL EVALUATION ADULT - NS FNS DIET ORDER
Diet, Regular:   DASH/TLC {Sodium & Cholesterol Restricted} (DASH) (05-28-23 @ 01:02) [Active]

## 2023-05-30 NOTE — DIETITIAN INITIAL EVALUATION ADULT - PERTINENT MEDS FT
MEDICATIONS  (STANDING):  apixaban 5 milliGRAM(s) Oral every 12 hours  cefepime   IVPB 2000 milliGRAM(s) IV Intermittent every 12 hours  furosemide    Tablet 40 milliGRAM(s) Oral daily  magnesium oxide 400 milliGRAM(s) Oral daily  metoprolol succinate  milliGRAM(s) Oral daily  potassium chloride    Tablet ER 20 milliEquivalent(s) Oral daily  tamsulosin 0.4 milliGRAM(s) Oral at bedtime  vancomycin  IVPB 1250 milliGRAM(s) IV Intermittent every 12 hours    MEDICATIONS  (PRN):  oxyCODONE    IR 5 milliGRAM(s) Oral every 6 hours PRN Moderate Pain (4 - 6)

## 2023-05-30 NOTE — PROGRESS NOTE ADULT - ASSESSMENT
81 y/o M w/ PMH of OA, dyslipidemia, HTN, CAD, a-fib, BPH, varicose veins, legally blind, p/w LLE cellulitis. Patient states he noticed LLE symptoms about 2-3 days ago. Patient has erythema / warmth. Also c/o pain in LLE. Denies fever, chills, nausea, vomiting, abdominal pain, cough, runny nose, sore throat, CP, SOB. UA positive, has crockett - changed in ER. Was given vancomycin/cefepime.     1. LLE cellulitis. Pyuria. Possible UTI. BPH. Urinary retention with crockett.  - imaging reviewed  - f/u urine cx- growing KLOX f/u sensitivities, blood cx no growth  - on vancomycin [id number] check trough prior to 4th dose #3-4  - dc merrem, no hx mdr/esbl in past   - on cefepime #3-4   - continue with antibiotic coverage  - if klox S cephalosporins - dc with po ceftin 500mg BID complete 7 day course   - monitor temps  - tolerating abx well so far; no side effects noted  - reason for abx use and side effects reviewed with patient  - supportive care  - fu cbc    2. other issues - care per medicine

## 2023-05-30 NOTE — DIETITIAN INITIAL EVALUATION ADULT - OTHER INFO
81 y/o M w/ PMH of OA, dyslipidemia, HTN, CAD, a-fib, BPH, varicose veins, legally blind, p/w LLE cellulitis. Patient states he noticed LLE symptoms about 2-3 days ago. Patient has erythema / warmth. Also c/o pain in LLE.     Pt appears overwt/ obese with NFPE revealing moderate temporal and severe orbital wasting only. Reports # and believes he has gained ~10# in the last 10 days. RD took bed scale wt on 5/30 at 232#. Pt does not meet criteria for PCM at this time. On DASH/TLC diet, would continue. Can add premier protein shakes BID to promote wound healing. Pt receptive. See other recs below.

## 2023-05-30 NOTE — PHYSICAL THERAPY INITIAL EVALUATION ADULT - PERTINENT HX OF CURRENT PROBLEM, REHAB EVAL
81 y/o M w/ PMH of OA, dyslipidemia, HTN, CAD, a-fib, BPH, varicose veins, legally blind, p/w LLE cellulitis. Patient states he noticed LLE symptoms about 2-3 days ago. Patient has erythema / warmth. Also c/o pain in LLE.

## 2023-05-30 NOTE — PHYSICAL THERAPY INITIAL EVALUATION ADULT - GENERAL OBSERVATIONS, REHAB EVAL
Pt was found sitting in chair with IV,crockett, LLE dressing+, Pt is pleasant and willing to participate in PT, no pain

## 2023-05-30 NOTE — DIETITIAN INITIAL EVALUATION ADULT - ORAL INTAKE PTA/DIET HISTORY
reports he has good appetite and eats "too well"  Lives alone, cooks/ shops for self  Reports occasional diarrhea

## 2023-05-30 NOTE — DIETITIAN INITIAL EVALUATION ADULT - ADD RECOMMEND
1) Add premier protein shake BID to optimize nutritional needs (provides 160 kcal, 30 g protein/ shake), 2) Encourage protein-rich foods, maximize food preferences, 3) Recommend to add MVI w/minerals, Vit C 500 mg BID, add Zinc Sulfate 220 mg x 10 days to promote wound healing, 4) Monitor bowel movements, if no BM for >3 days, consider implementing bowel regimen, 5) Obtain vitamin D 25OH level to assess nutriture, 6) monitor PO intake and for signs of malnutrition - pt is at high risk. RD will continue to monitor PO intake, labs, hydration, and wt prn.

## 2023-05-30 NOTE — DIETITIAN INITIAL EVALUATION ADULT - NSFNSGIIOFT_GEN_A_CORE
I&O's Detail    29 May 2023 07:01  -  30 May 2023 07:00  --------------------------------------------------------  IN:  Total IN: 0 mL    OUT:    Indwelling Catheter - Urethral (mL): 1975 mL  Total OUT: 1975 mL    Total NET: -1975 mL

## 2023-05-30 NOTE — DIETITIAN INITIAL EVALUATION ADULT - PERTINENT LABORATORY DATA
05-30    139  |  106  |  16  ----------------------------<  88  3.9   |  27  |  0.72    Ca    8.8      30 May 2023 06:41  Phos  3.5     05-30  Mg     2.2     05-30

## 2023-05-31 NOTE — PROGRESS NOTE ADULT - ASSESSMENT
83 y/o M w/ PMH of CAD, A fib on Eliquis, BPH, urinary retention s/p Crockett ,  OA,  HTN, HLD,  varicose veins, legally blind admitted on 5/27/23 with  LLE cellulitis. Patient states he noticed LLE symptoms about 2-3 days ago. Patient has erythema / warmth. Also c/o pain in LLE.       LLE Cellulitis   -F/u blood cx NTD  , CRP 22  -c/w  vanco cefepime , dc  meropenem   -US LLE: negative for DVT, +Popliteal fossa cyst (F/u outpatient ortho)   - arterial doppler LE - no stenosis  - elevate legs  -Chronic crockett changed in ED     Klebsiella oxytoca  UTI associated with Crockett,  Urinary retention BPH   - c/w ceftriaxone   - urine cx noted     Chronic A fib  - c/w Eliquis, BB     Leg edema ,  varicose veins   - c/w lasix, daily weights  - check BNP elevated   - 2 d echo - to r/o CHF     Legally blind  - supportive care    DVT ppx  -On Eliquis    Daughter Ramona updated 5/31    DISPO - home when ready, lives alone,

## 2023-05-31 NOTE — PROGRESS NOTE ADULT - SUBJECTIVE AND OBJECTIVE BOX
CC:  leg edema, redness, pain         83 y/o M w/ PMH of CAD, A fib on Eliquis, BPH, urinary retention s/p Goode ,  OA,  HTN, HLD,  varicose veins, legally blind admitted on 23 with  LLE cellulitis. Patient states he noticed LLE symptoms about 2-3 days ago. Patient has erythema / warmth. Also c/o pain in LLE.      - pt seen and examined, + leg edema, redness, pain, difficulties ambulating, denies cp, dyspnea, abdominal pain , tolerating po intake   - pt seen and examined, leg edema improving and redness, denies pain, OOB in the chair, tolerating po intake, afebrile      Review of system- Rest of the review of system are negative except mentioned in HPI    Vital sings reviewed for last 24 h  T(C): 36.3 (23 @ 16:19), Max: 36.9 (23 @ 21:34)  T(F): 97.4 (23 @ 16:19), Max: 98.4 (23 @ 21:34)  HR: 80 (23 @ 16:19) (62 - 80)  BP: 105/62 (23 @ 16:19) (104/58 - 129/72)  RR: 18 (23 @ 16:19) (17 - 18)  SpO2: 97% (23 @ 16:19) (97% - 100%)  Wt(kg): --  Daily     Daily Weight in k.5 (31 May 2023 04:48)  CAPILLARY BLOOD GLUCOSE            Physical exam :   Constitutional: NAD, awake and alert, conversant   HEENT: PERR, EOMI  Neck: Soft and supple,  No JVD  Respiratory: Breath sounds are clear bilaterally, No wheezing, rales or rhonchi  Cardiovascular: S1 and S2, regular rate and rhythm, no Murmurs  Gastrointestinal: Bowel Sounds present, soft, nontender, nondistended  Extremities: + 2 LE  peripheral edema  Vascular: + peripheral pulses  Neurological: A/O x 3, no focal deficits  Musculoskeletal: 5/5 strength b/l upper and lower extremities  Skin: LLE erythema  with open weeping wound     All labs radiology and other studies reviewed and interpreted :         138  |  108  |  19  ----------------------------<  94  4.1   |  25  |  0.73    Ca    8.9      31 May 2023 07:35  Phos  3.1       Mg     2.1         TPro  6.7  /  Alb  2.7<L>  /  TBili  0.5  /  DBili  x   /  AST  19  /  ALT  17  /  AlkPhos  85                              13.5   4.87  )-----------( 201      ( 31 May 2023 07:35 )             40.5       CARDIAC MARKERS ( 31 May 2023 07:35 )  x     / x     / 29 U/L / x     / x            LIVER FUNCTIONS - ( 31 May 2023 07:35 )  Alb: 2.7 g/dL / Pro: 6.7 gm/dL / ALK PHOS: 85 U/L / ALT: 17 U/L / AST: 19 U/L / GGT: x                 12 Lead ECG (23 @ 20:24) >  Ventricular Rate 77 BPM   Atrial fibrillation  Right bundle branch block  Abnormal ECG  When compared with ECG of 29-DEC-2022 16:40,  QRS duration has increased      US Duplex Arterial Lower Ext Compl, Bilateral (23 @ 14:59) >  IMPRESSION:  *  No evidence of occlusive disease but abnormal dampened and monophasic   flow below the knees suggest infrapopliteal disease.     US Duplex Venous Lower Ext Ltd, Left (23 @ 19:59) >  No evidence of left lower extremity deep venous thrombosis.  LEFT popliteal fossa cyst.     Xray Tibia + Fibula 2 Views, Left (23 @ 20:56) >  IMPRESSION: Normal tibia and fibula. No evidence of subcutaneous air.      MEDICATIONS  (STANDING):  apixaban 5 milliGRAM(s) Oral every 12 hours  cefepime   IVPB 2000 milliGRAM(s) IV Intermittent every 12 hours  furosemide    Tablet 40 milliGRAM(s) Oral daily  magnesium oxide 400 milliGRAM(s) Oral daily  metoprolol succinate  milliGRAM(s) Oral daily  potassium chloride    Tablet ER 20 milliEquivalent(s) Oral daily  tamsulosin 0.4 milliGRAM(s) Oral at bedtime  vancomycin  IVPB 1250 milliGRAM(s) IV Intermittent every 12 hours    MEDICATIONS  (PRN):  oxyCODONE    IR 5 milliGRAM(s) Oral every 6 hours PRN Moderate Pain (4 - 6)

## 2023-06-01 NOTE — DISCHARGE NOTE PROVIDER - PROVIDER TOKENS
PROVIDER:[TOKEN:[[id number]],FOLLOWUP:[1 week]],FREE:[LAST:[urologist],PHONE:[(   )    -],FAX:[(   )    -],FOLLOWUP:[2 weeks]],FREE:[LAST:[cardiologist],PHONE:[(   )    -],FAX:[(   )    -],FOLLOWUP:[1 week]]

## 2023-06-01 NOTE — PROGRESS NOTE ADULT - SUBJECTIVE AND OBJECTIVE BOX
Date of service: 06-01-23 @ 11:28    pt seen and examined  LLE improving  crockett in place urine clear  no fevers    ROS: no fever or chills; denies dizziness, no HA, no SOB or cough, no abdominal pain, no diarrhea or constipation; no legs pain, no rashes    MEDICATIONS  (STANDING):  apixaban 5 milliGRAM(s) Oral every 12 hours  cefuroxime Tablet 500 milliGRAM(s) Oral every 12 hours  furosemide Tablet 40 milliGRAM(s) Oral daily  magnesium oxide 400 milliGRAM(s) Oral daily  metoprolol succinate  milliGRAM(s) Oral daily  potassium chloride    Tablet ER 20 milliEquivalent(s) Oral daily  tamsulosin 0.4 milliGRAM(s) Oral at bedtime      Vital Signs Last 24 Hrs  T(C): 36.5 (01 Jun 2023 09:27), Max: 36.9 (01 Jun 2023 05:12)  T(F): 97.7 (01 Jun 2023 09:27), Max: 98.5 (01 Jun 2023 05:12)  HR: 71 (01 Jun 2023 09:27) (71 - 80)  BP: 113/51 (01 Jun 2023 09:27) (105/62 - 115/71)  BP(mean): --  RR: 17 (01 Jun 2023 09:27) (17 - 18)  SpO2: 98% (01 Jun 2023 09:27) (95% - 98%)    Parameters below as of 01 Jun 2023 09:27  Patient On (Oxygen Delivery Method): room air      PE:  Constitutional: NAD   HEENT: NC/AT, EOMI, PERRLA, conjunctivae clear; ears and nose atraumatic; pharynx benign  Neck: supple; thyroid not palpable  Back: no tenderness  Respiratory: respiratory effort normal; clear to auscultation  Cardiovascular: S1S2 regular, no murmurs  Abdomen: soft, not tender, not distended, positive BS; liver and spleen WNL  Genitourinary: no suprapubic tenderness  Lymphatic: no LN palpable  Musculoskeletal: no muscle tenderness, no joint swelling or tenderness  Extremities: no pedal edema LLE erythema, warmth  Neurological/ Psychiatric: AxOx3, Judgement and insight normal;  moving all extremities  Skin: no rashes; no palpable lesions    Labs: all available labs reviewed                                                         13.5   4.87  )-----------( 201      ( 31 May 2023 07:35 )             40.5     06-01    138  |  107  |  24<H>  ----------------------------<  89  4.4   |  28  |  0.78    Ca    9.8      01 Jun 2023 08:23  Phos  3.1     06-01  Mg     2.3     06-01    TPro  6.7  /  Alb  2.7<L>  /  TBili  0.5  /  DBili  x   /  AST  19  /  ALT  17  /  AlkPhos  85  05-31       Vancomycin Level, Trough: 20.8 ug/mL (05-31 @ 09:26)        Vancomycin Level, Trough: 20.8 ug/mL (05-31 @ 09:26)    Culture - Urne (05.28.23 @ 00:43)   Specimen Source: Clean Catch Clean Catch (Midstream)  Culture Results:   >100,000 CFU/ml Klebsiella oxytoca/Raoultella ornithinolytica  Culture - Blood (05.27.23 @ 20:57)   Specimen Source: .Blood None  Culture Results:   No growth to date.    Radiology: all available radiological tests reviewed  < from: Xray Tibia + Fibula 2 Views, Left (05.27.23 @ 20:56) >    ACC: [id number] EXAM:  XR TIB FIB AP LAT 2 VIEWS LT   ORDERED BY: BEATA KERN     PROCEDURE DATE:  05/27/2023          INTERPRETATION:  INDICATIONS: Evaluate for air.    COMPARISON: None.    FINDINGS: 4 views of the left tibia and fibula. There is no evidence of   fracture or dislocation. The soft tissues are unremarkable.    IMPRESSION: Normal tibia and fibula. No evidence of subcutaneous air.      Advanced directives addressed: full resuscitation

## 2023-06-01 NOTE — DISCHARGE NOTE PROVIDER - NSDCCPTREATMENT_GEN_ALL_CORE_FT
PRINCIPAL PROCEDURE  Procedure: 2D echo  Findings and Treatment: Mild concentric left ventricular hypertrophy is present.   The left ventricle is normal in size, wall motion, and contractility.   Estimated left ventricular ejection fraction is 60 %.   The left atrium is severely dilated.   The right atrium appears severely dilated.   The right ventricle is mildly dilated.   The aortic valve appears severely calcified. Valve opening seems to be   restricted.   Transaortic gradients are elevated but do not meet the criteria for   aortic   stenosis.   [place] by continuity equation (1.1 cm2) and dimensionless index (.31)   suggest   moderate aortic stenosis.   Mild to moderate mitral annular calcification is present.   There is calcification of mitral valve leaflets. The leaflet opening is   mildly restricted.   Mean transmitral gradient is 4 mmHg; this finding is consistent with mild   mitral stenosis.   At least moderate (2+) mitral regurgitation is present.   The tricuspid valve leaflets are thin and pliable; valve motion is   normal.   Moderate to severe (3+) highly eccentric tricuspid valve regurgitation is   present.   Mild pulmonary hypertension.   No evidence of pericardial effusion.   The IVC is dilated.        SECONDARY PROCEDURE  Procedure: US Doppler vein of extremity lower left  Findings and Treatment: FINDINGS:  There is normal compressibility of the left common femoral, femoral and   popliteal veins.  The contralateral common femoral vein is patent.  Doppler examination shows normal spontaneous and phasic flow.  No calf vein thrombosis is detected.  Popliteal fossa cyst measuring 1.4 x 0.7 x 1.7 cm.  IMPRESSION:  No evidence of left lower extremity deep venous thrombosis.  LEFT popliteal fossa cyst.      Procedure: LE arterial dopplers  Findings and Treatment:   Real-time sonography of the bilateral lower extremity arterial system was   performed using a high-resolution linear array transducer and including   color and spectral Doppler.  Mild scattered plaque. No soft tissue abnormalities are demonstrated in   either leg. Flow phase patterns and peak systolic velocity measurements   (in cm/s) were observed as follows:  RIGHT:  CFA: Triphasic; 81  Proximal SFA: Biphasic; 78  Mid SFA: Biphasic; 74  Distal SFA: Biphasic;  40  Popliteal: Biphasic;  56  Anterior tibial: Monophasic; 37  Posterior tibial: Biphasic; 80  Peroneal: Not seen;  Dorsalis pedis: Monophasic;  13  LEFT:  CFA: Triphasic; 108  Proximal SFA: Triphasic; 71  Mid SFA: Triphasic; 87  Distal SFA: Triphasic; 108  Popliteal: Monophasic;  69  Anterior tibial: Monophasic; 91  Posterior tibial: Monophasic; 31  Peroneal: Not seen;  Dorsalis pedis: Monophasic;  22  IMPRESSION:  *  No evidence of occlusive disease but abnormal dampened and monophasic   flow below the knees suggest infrapopliteal disease.       No Heavy lifting/straining/Do not drive or operate machinery/Do not make important decisions/Showering allowed

## 2023-06-01 NOTE — DISCHARGE NOTE PROVIDER - NSDCMRMEDTOKEN_GEN_ALL_CORE_FT
cefuroxime 500 mg oral tablet: 1 tab(s) orally every 12 hours  Eliquis 5 mg oral tablet: 1 tab(s) orally 2 times a day  furosemide 40 mg oral tablet: 1 tab(s) orally once a day  magnesium oxide 500 mg oral tablet: 1 tab(s) orally once a day  metoprolol succinate 100 mg oral tablet, extended release: 1 tab(s) orally once a day  potassium chloride 20 mEq oral tablet, extended release: 1 tab(s) orally once a day  pravastatin 20 mg oral tablet: 1 tab(s) orally once a day  tamsulosin 0.4 mg oral capsule: 1 cap(s) orally once a day   cefuroxime 500 mg oral tablet: 1 tab(s) orally every 12 hours  cholecalciferol 50 mcg (2000 intl units) oral tablet: 1 tab(s) orally once a day (at bedtime)  Eliquis 5 mg oral tablet: 1 tab(s) orally 2 times a day  furosemide 40 mg oral tablet: 1 tab(s) orally once a day  magnesium oxide 500 mg oral tablet: 1 tab(s) orally once a day  metoprolol succinate 100 mg oral tablet, extended release: 1 tab(s) orally once a day  potassium chloride 20 mEq oral tablet, extended release: 1 tab(s) orally once a day  pravastatin 20 mg oral tablet: 1 tab(s) orally once a day  tamsulosin 0.4 mg oral capsule: 1 cap(s) orally once a day

## 2023-06-01 NOTE — DISCHARGE NOTE PROVIDER - NSDCCPCAREPLAN_GEN_ALL_CORE_FT
PRINCIPAL DISCHARGE DIAGNOSIS  Diagnosis: Cellulitis  Assessment and Plan of Treatment: complete 5 more days of po antibiotics, follow up with PCP within 1 week for further monitoring and management      SECONDARY DISCHARGE DIAGNOSES  Diagnosis: Open wound of left lower leg  Assessment and Plan of Treatment: Wound care : clean with saline, aquacel AG to wound bed , triad cream to periwound skin apply addaptic ABD pad and wrap with kurlix daily   change prn if saturated    Diagnosis: Heart failure due to valvular disease  Assessment and Plan of Treatment: continue home dose furosemide   follow up with your PCP or cardiologist for further care  you had 2 d echo done durine this admission which showed moderate Aortic stenosis and modertae to severe tricuspid regurgitation    Diagnosis: UTI due to Klebsiella species  Assessment and Plan of Treatment: complete antibiotics    Diagnosis: Chronic indwelling Goode catheter  Assessment and Plan of Treatment: routine Goode care, follow up with your urologist for Goode catheter changes     PRINCIPAL DISCHARGE DIAGNOSIS  Diagnosis: Cellulitis  Assessment and Plan of Treatment: complete 5 more days of po antibiotics, follow up with PCP within 1 week for further monitoring and management      SECONDARY DISCHARGE DIAGNOSES  Diagnosis: Open wound of left lower leg  Assessment and Plan of Treatment: Wound care : clean with saline, aquacel AG to wound bed , triad cream to periwound skin apply addaptic ABD pad and wrap with kurlix daily   change prn if saturated    Diagnosis: Heart failure due to valvular disease  Assessment and Plan of Treatment: continue home dose furosemide   follow up with your PCP or cardiologist for further care  you had 2 d echo done durine this admission which showed moderate Aortic stenosis and modertae to severe tricuspid regurgitation    Diagnosis: UTI due to Klebsiella species  Assessment and Plan of Treatment: complete antibiotics    Diagnosis: Chronic indwelling Goode catheter  Assessment and Plan of Treatment: routine Goode care, follow up with your urologist for Goode catheter changes    Diagnosis: Vitamin D deficiency  Assessment and Plan of Treatment: take vitamin D daily, recheck the level in 1-2 month

## 2023-06-01 NOTE — PROGRESS NOTE ADULT - ASSESSMENT
83 y/o M w/ PMH of OA, dyslipidemia, HTN, CAD, a-fib, BPH, varicose veins, legally blind, p/w LLE cellulitis. Patient states he noticed LLE symptoms about 2-3 days ago. Patient has erythema / warmth. Also c/o pain in LLE. Denies fever, chills, nausea, vomiting, abdominal pain, cough, runny nose, sore throat, CP, SOB. UA positive, has crockett - changed in ER. Was given vancomycin/cefepime.     1. LLE cellulitis. Pyuria. KLOX UTI. BPH. Urinary retention with crockett.  - imaging reviewed  - f/u urine cx- growing KLOX sensitivities reviewed, blood cx no growth  - on vancomycin  #4-5 level high - dc   - dc merrem, no hx mdr/esbl in past   - s/p cefepime #3-4, s/p rocephin #1   - continue with antibiotic coverage  - dc with po ceftin 500mg BID complete 5 day course   - monitor temps  - tolerating abx well so far; no side effects noted  - reason for abx use and side effects reviewed with patient  - supportive care  - fu cbc    2. other issues - care per medicine

## 2023-06-01 NOTE — PROGRESS NOTE ADULT - PROVIDER SPECIALTY LIST ADULT
Hospitalist
Infectious Disease
Hospitalist
Infectious Disease
Infectious Disease

## 2023-06-01 NOTE — DISCHARGE NOTE PROVIDER - CARE PROVIDER_API CALL
Valentin Taylor  Internal Medicine  [address], [address]  [place] [address]  Phone: [phone]  Fax: [phone]  Follow Up Time: 1 week    urologist,   Phone: (   )    -  Fax: (   )    -  Follow Up Time: 2 weeks    cardiologist,   Phone: (   )    -  Fax: (   )    -  Follow Up Time: 1 week

## 2023-06-01 NOTE — DISCHARGE NOTE PROVIDER - HOSPITAL COURSE
CC:  leg edema, redness, pain         83 y/o M w/ PMH of CAD, A fib on Eliquis, BPH, urinary retention s/p Crockett ,  OA,  HTN, HLD,  varicose veins, legally blind admitted on 5/27/23 with  LLE cellulitis. Patient states he noticed LLE symptoms about 2-3 days ago. Patient has erythema / warmth. Also c/o pain in LLE.     5/30 - pt seen and examined, + leg edema, redness, pain, difficulties ambulating, denies cp, dyspnea, abdominal pain , tolerating po intake  5/31 - pt seen and examined, leg edema improving and redness, denies pain, OOB in the chair, tolerating po intake, afebrile  6/1 - pt seen and examined , denies cp, dyspnea, abdominal painn, leg pain ,afebrile, eager to go home  Review of system- Rest of the review of system are negative except mentioned in HPI  Vital sings reviewed for last 24 h  T(C): 36.6 (06-01-23 @ 15:25), Max: 36.9 (06-01-23 @ 05:12)  T(F): 97.8 (06-01-23 @ 15:25), Max: 98.5 (06-01-23 @ 05:12)  HR: 74 (06-01-23 @ 15:25) (71 - 80)  BP: 109/58 (06-01-23 @ 15:25) (105/62 - 115/71)  RR: 18 (06-01-23 @ 15:25) (17 - 18)  SpO2: 98% (06-01-23 @ 15:25) (95% - 98%)  Physical exam :   Constitutional: NAD, awake and alert, conversant   HEENT: PERR, EOMI  Neck: Soft and supple,  No JVD  Respiratory: Breath sounds are clear bilaterally, No wheezing, rales or rhonchi  Cardiovascular: S1 and S2, regular rate and rhythm, no Murmurs  Gastrointestinal: Bowel Sounds present, soft, nontender, nondistended  Extremities: + 2 LE  peripheral edema  Vascular: + peripheral pulses  Neurological: A/O x 3, no focal deficits  Musculoskeletal: 5/5 strength b/l upper and lower extremities  Skin: LLE erythema  with open weeping wound   All labs radiology and other studies reviewed and interpreted :   C-Reactive Protein, Serum: 16 mg/L (06-01-23 @ 08:23)  C-Reactive Protein, Serum: 22 mg/L (05-31-23 @ 07:35)  · Assessment       83 y/o M w/ PMH of CAD, A fib on Eliquis, BPH, urinary retention s/p Crockett ,  OA,  HTN, HLD,  varicose veins, legally blind admitted on 5/27/23 with  LLE cellulitis. Patient states he noticed LLE symptoms about 2-3 days ago. Patient has erythema / warmth. Also c/o pain in LLE.   LLE Cellulitis   -F/u blood cx NTD  , CRP 22  -c/w  vanco cefepime , dc  meropenem --> cefuroxime 500 bid for 5 days  -US LLE: negative for DVT, +Popliteal fossa cyst (F/u outpatient ortho)   - arterial doppler LE - no stenosis  - elevate legs  -Chronic crockett changed in ED   Klebsiella oxytoca  UTI associated with Crockett,  Urinary retention BPH   - c/w ceftriaxone , complete cefuroxime  - urine cx noted   Chronic A fib  - c/w Eliquis, BB   Leg edema ,  varicose veins   Valvular heart disease with moderate AS and TR   - c/w lasix, daily weights  - check BNP elevated    - 2 d echo -  EF 60 % , moderate AS, moderate TR , mild pHTN  Legally blind  - supportive care  DVT ppx  -On Eliquis  Daughter Ramona updated 5/31, 6/1   Final diagnosis, treatment plan, and follow-up recommendations were discussed and explained to the patient.   The patient was given an opportunity to ask questions concerning the diagnosis and treatment plan.   The patient acknowledged understanding of the diagnosis, treatment, and follow-up recommendations.   The patient was advised to seek urgent care upon discharge if worsening symptoms develop prior to scheduled follow-up.   Time spent on discharge included time with the patient, and also coordinating discharge care as outlined below.  Discharge note faxed to PCP with my contact information to call me back   PCP Dr. Mayen   Total time spent: 50 min           CC:  leg edema, redness, pain         81 y/o M w/ PMH of CAD, A fib on Eliquis, BPH, urinary retention s/p Crockett ,  OA,  HTN, HLD,  varicose veins, legally blind admitted on 5/27/23 with  LLE cellulitis. Patient states he noticed LLE symptoms about 2-3 days ago. Patient has erythema / warmth. Also c/o pain in LLE.     5/30 - pt seen and examined, + leg edema, redness, pain, difficulties ambulating, denies cp, dyspnea, abdominal pain , tolerating po intake  5/31 - pt seen and examined, leg edema improving and redness, denies pain, OOB in the chair, tolerating po intake, afebrile  6/1 - pt seen and examined , denies cp, dyspnea, abdominal painn, leg pain ,afebrile, eager to go home  Review of system- Rest of the review of system are negative except mentioned in HPI  Vital sings reviewed for last 24 h  T(C): 36.6 (06-01-23 @ 15:25), Max: 36.9 (06-01-23 @ 05:12)  T(F): 97.8 (06-01-23 @ 15:25), Max: 98.5 (06-01-23 @ 05:12)  HR: 74 (06-01-23 @ 15:25) (71 - 80)  BP: 109/58 (06-01-23 @ 15:25) (105/62 - 115/71)  RR: 18 (06-01-23 @ 15:25) (17 - 18)  SpO2: 98% (06-01-23 @ 15:25) (95% - 98%)  Physical exam :   Constitutional: NAD, awake and alert, conversant   HEENT: PERR, EOMI  Neck: Soft and supple,  No JVD  Respiratory: Breath sounds are clear bilaterally, No wheezing, rales or rhonchi  Cardiovascular: S1 and S2, regular rate and rhythm, no Murmurs  Gastrointestinal: Bowel Sounds present, soft, nontender, nondistended  Extremities: + 2 LE  peripheral edema  Vascular: + peripheral pulses  Neurological: A/O x 3, no focal deficits  Musculoskeletal: 5/5 strength b/l upper and lower extremities  Skin: LLE erythema  with open weeping wound   All labs radiology and other studies reviewed and interpreted :   C-Reactive Protein, Serum: 16 mg/L (06-01-23 @ 08:23)  C-Reactive Protein, Serum: 22 mg/L (05-31-23 @ 07:35)  · Assessment       81 y/o M w/ PMH of CAD, A fib on Eliquis, BPH, urinary retention s/p Crockett ,  OA,  HTN, HLD,  varicose veins, legally blind admitted on 5/27/23 with  LLE cellulitis. Patient states he noticed LLE symptoms about 2-3 days ago. Patient has erythema / warmth. Also c/o pain in LLE.   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   The patient was given an opportunity to ask questions concerning the diagnosis and treatment plan.   The patient acknowledged understanding of the diagnosis, treatment, and follow-up recommendations.   The patient was advised to seek urgent care upon discharge if worsening symptoms develop prior to scheduled follow-up.   Time spent on discharge included time with the patient, and also coordinating discharge care as outlined below.  Discharge note faxed to PCP with my contact information to call me back   PCP Dr. Mayen   Total time spent: 50 min

## 2023-06-05 NOTE — CDI QUERY NOTE - NSCDIOTHERTXTBX_GEN_ALL_CORE_HH
Could you please document in the DC summary , if in agreement with the Flow sheets and Dietitian consult , the pressure injury findings, stage and location      -Pressure ulcer stage 2,  right buttock, POA   -Other please specify  -Clinically not significative    Chart Documentation    Flow sheets-pressure ulcer stage 2 POA right buttock and pressure     Dietitian consult-SkinBraden score 17 - cellulitis Pressure Injury - Right:, buttocks, Stage II  GI/IOno BM doc'd - not on bowel regimen.      DC summary-Skin: LLE erythema  with open weeping wound

## 2023-06-07 PROBLEM — R39.15 URINARY URGENCY: Status: ACTIVE | Noted: 2021-12-07

## 2023-06-20 NOTE — ED ADULT NURSE NOTE - NSIMPLEMENTINTERV_GEN_ALL_ED
Implemented All Fall with Harm Risk Interventions:  [place] to call system. Call bell, personal items and telephone within reach. Instruct patient to call for assistance. Room bathroom lighting operational. Non-slip footwear when patient is off stretcher. Physically safe environment: no spills, clutter or unnecessary equipment. Stretcher in lowest position, wheels locked, appropriate side rails in place. Provide visual cue, wrist band, yellow gown, etc. Monitor gait and stability. Monitor for mental status changes and reorient to person, place, and time. Review medications for side effects contributing to fall risk. Reinforce activity limits and safety measures with patient and family. Provide visual clues: red socks. n/a

## 2023-09-18 PROBLEM — R33.9 RETENTION OF URINE: Status: ACTIVE | Noted: 2021-05-04

## 2023-09-20 PROBLEM — N21.0 BLADDER STONES: Status: ACTIVE | Noted: 2023-01-01

## 2023-09-20 PROBLEM — N40.1 BPH WITH URINARY OBSTRUCTION: Status: ACTIVE | Noted: 2021-05-04

## 2023-09-20 PROBLEM — N32.89 BLADDER SPASMS: Status: ACTIVE | Noted: 2021-10-20

## 2023-09-21 NOTE — ED PROVIDER NOTE - PROGRESS NOTE DETAILS
Goode placed by RN, patient has large degree of relief.  Urine noted to have hematuria.  Patient is on Eliquis.  Will give IV fluid hydration, assess urinalysis, reassess for dispo. All labs reviewed.  Patient has slight elevation in his creatinine from baseline.  Urinalysis with nitrites, moderate leuks, large blood, 3–5 RBCs.  Will treat as UTI, ceftriaxone ordered and will give cephalosporin outpatient.  RN to change to leg bag with sticker on thigh to prevent repeat dislodgement. Urine light pink without clots. Patient to follow-up with urologist.  Discussed return precautions and all questions answered. Pt in agreement w/ plan. CAOx3, NAD, VSS. Stable for d/c.

## 2023-09-21 NOTE — ED PROVIDER NOTE - ABNORMAL RHYTHM
atrial fibrillation Airway patent, TM normal bilaterally, normal appearing mouth, nose, throat, neck supple with full range of motion, no cervical adenopathy. Airway patent, normal appearing mouth, nose, throat, neck supple with full range of motion, no cervical adenopathy. Moist mucous membranes.

## 2023-09-21 NOTE — ED ADULT NURSE NOTE - CHIEF COMPLAINT QUOTE
Pt. BIBEMS from home c/o b/l flank and abdominal pain. Pt. reports having crockett catheter placed yesterday by Dr Godfrey and it has not had any drainage since this morning.

## 2023-09-21 NOTE — ED PROVIDER NOTE - PHYSICAL EXAMINATION
GENERAL: A&Ox4, non-toxic appearing, uncomfortable appearing  HEENT: NCAT, EOMI, oral mucosa moist, normal conjunctiva  RESP: no respiratory distress, peaking in full sentences  CV: Tachycardic  ABDOMEN: soft, non-tender, non-distended, no guarding, no CVA tenderness. Suprapubic fullness.   : Goode in place  MSK: no visible deformities  NEURO: no focal sensory or motor deficits, CN 2-12 grossly intact  SKIN: warm, normal color, well perfused, no rash  PSYCH: normal affect

## 2023-09-21 NOTE — ED PROVIDER NOTE - CLINICAL SUMMARY MEDICAL DECISION MAKING FREE TEXT BOX
83 y/o male with urinary retention yesterday s/p crockett placement now with nondraining crockett. Will attempt to irrigate, change crockett as needed, obtain basic labs.

## 2023-09-21 NOTE — ED PROVIDER NOTE - NSFOLLOWUPINSTRUCTIONS_ED_ALL_ED_FT
Goode Catheter Placement and Care    Please  your antibiotics from the pharmacy and take them as prescribed. Continue taking until finished, even if you feel completely better. Inform your primary doctor if you experience rash, shortness of breath, abdominal pain, or diarrhea.     Follow up with urologist as scheduled.     WHAT YOU NEED TO KNOW:  A Goode catheter is a sterile tube that is inserted into your bladder to drain urine. It is also called an indwelling urinary catheter. The tip of the catheter has a small balloon filled with solution that holds the catheter inside your bladder.  DISCHARGE INSTRUCTIONS:  Seek care immediately if:   •Your catheter comes out.   •You suddenly have material that looks like sand in the tubing or drainage bag.  •No urine is draining into the bag and you have checked the system.  •You have pain in your hip, back, pelvis, or lower abdomen.  •You are confused or cannot think clearly.  Call your doctor or urologist if:   •You have a fever.  •You have bladder spasms for more than 1 day after the catheter is placed.  •You see blood in the tubing or drainage bag.  •You have a rash or itching where the catheter tube is secured to your skin.  •Urine leaks from or around the catheter, tubing, or drainage bag.  •The closed drainage system has accidently come open or apart.   •You see a layer of crystals inside the tubing.  •You have questions or concerns about your condition or care.  Care for your Goode catheter:   •Clean your genital area 2 times every day. Clean your catheter and the area around where it was inserted. Use soap and water. Clean your anal opening and catheter area after every bowel movement.   •Secure the catheter tube so you do not pull or move the catheter. This helps prevent pain and bladder spasms. Healthcare providers will show you how to use medical tape or a strap to secure the catheter tube to your body.   •Keep a closed drainage system. Your Goode catheter should always be attached to the drainage bag to form a closed system. Do not disconnect any part of the closed system unless you need to change the bag.  Care for your drainage bag:   •Ask if a leg bag is right for you. A leg bag can be worn under your clothes. Ask your healthcare provider for more information about a leg bag.   •Keep the drainage bag below the level of your waist. This helps stop urine from moving back up the tubing and into your bladder. Do not loop or kink the tubing. This can cause urine to back up and collect in your bladder. Do not let the drainage bag touch or lie on the floor.  •Empty the drainage bag when needed. The weight of a full drainage bag can be painful. Empty the drainage bag every 3 to 6 hours or when it is ? full.   •Clean and change the drainage bag as directed. Ask your healthcare provider how often you should change the drainage bag and what cleaning solution to use. Wear disposable gloves when you change the bag. Do not allow the end of the catheter or tubing to touch anything. Clean the ends with an alcohol pad before you reconnect them.  What to do if problems develop:   •No urine is draining into the bag: ?Check for kinks in the tubing and straighten them out.   ?Check the tape or strap used to secure the catheter tube to your skin. Make sure it is not blocking the tube.   ?Make sure you are not sitting or lying on the tubing.  ?Make sure the urine bag is hanging below the level of your waist.  •Urine leaks from or around the catheter, tubing, or drainage bag: Check if the closed drainage system has accidently come open or apart. Clean the catheter and tubing ends with a new alcohol pad and reconnect them.   Prevent an infection:   •Wash your hands often. Wash before and after you touch your catheter, tubing, or drainage bag. Use soap and water. Wear clean disposable gloves when you care for your catheter or disconnect the drainage bag. Wash your hands before you prepare or eat food.   Handwashing   •Drink liquids as directed. Ask your healthcare provider how much liquid to drink each day and which liquids are best for you. Liquids will help flush your kidneys and bladder to help prevent infection.  Follow up with your doctor or urologist as directed: Write down your questions so you remember to ask them during your visits.

## 2023-09-21 NOTE — ED PROVIDER NOTE - OBJECTIVE STATEMENT
83 y/o male with a PMHx of Afib, BPH, hearing loss, HTN, hypercholesterolemia, legally blind, OA, obesity, varicose veins presents to the ED for evaluation. Pt reports he had a crockett placed in Dr. Godfrey's office yesterday. Pt reports it stopped draining last night. +abd discomfort. Denies n/v/d. No other complaints at this time.

## 2023-09-21 NOTE — ED ADULT NURSE NOTE - OBJECTIVE STATEMENT
Pt presented to the ER with c/o urinary retention. Pt stated that he had his crockett changed yesterday at his urologist and since this morning he had no output. Pt c/o lower abdominal discomfort. Pt noted to have blood in his leg bag. Crockett changed without any complications, pt noted to have hematuria at time of change. Pt reported relief in pressure. Crockett draining without any complications.

## 2023-09-21 NOTE — ED PROVIDER NOTE - PATIENT PORTAL LINK FT
You can access the FollowMyHealth Patient Portal offered by [facility] by registering at the following website: [email or web address]. By joining [employer]’s FollowMyHealth portal, you will also be able to view your health information using other applications (apps) compatible with our system.

## 2023-09-21 NOTE — ED ADULT TRIAGE NOTE - CHIEF COMPLAINT QUOTE
Pt. BIBEMS from home c/o b/l flank and abdominal pain. Pt. reports having crokcett catheter placed yesterday by Dr Godfrey and it has not had any drainage since this morning.

## 2023-10-02 NOTE — ED ADULT TRIAGE NOTE - CCCP TRG CHIEF CMPLNT
urinary catheter complications Ear Star Wedge Flap Text: Because of the tightness of the surrounding skin, the full-thickness nature of the defect with exposed cartilage, and to avoid deformity, a star wedge advancement flap was planed.  After prep and anesthesia, a full-thickness triangular wedge of tissue was excised, as well as two brows triangles on either side of this to reduce cupping deformity. The chondrocutaneous flaps were then advanced and closed in a layered fashion.

## 2023-10-08 NOTE — CONSULT NOTE ADULT - SUBJECTIVE AND OBJECTIVE BOX
Patient is a 82y old  Male who presents with a chief complaint of sob (08 Oct 2023 19:13)    BRIEF HOSPITAL COURSE:   Mr. 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 According to Ramona, daughter, patient lives at by himself. He has been coughing for a couple of days but today he has become confused and having visual hallucinations which is why she brought patient to ED. 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 Patient is able to tell me his name, his daughter's name, his birthday, the year, but thinks he is at home. Last alcoholic beverage was on 10/06/2023. Patient noted to have severe bronchospasms for which he was given nebulized treatments, placed on Bi-Level NIPPV for which I switched to AVAPS, and steroid therapy. Patient states his breathing is better, but he is also in afib w/RVR to 140s and tachypneic to mid 30s but does not seem to be using accessory muscles now. Ramona states he is not good with taking his medications, including his Eliquis. Patient could possibly have had cardioembolic event explaining confusion. Daughter also states last time patient was admitted also had hallucinations when he had cellulitis so there could be a component of metabolic encephalopathy. ABG is 7.35/33/64/18 on 50% FiO2. Chest xray shows dense R sided infiltrate. Patient is + for rhinovirus. Received Zosyn in ED. Ordered 5mg IVP metoprolol for Afib w/some relief and also ordered another dose of nebulized albuterol.     Events last 24 hours: ***    Review of Systems:  Negative besides mentioned in HPI     PAST MEDICAL & SURGICAL HISTORY:  BPH (Benign Prostatic Hypertrophy)  laser   Obesity  Hearing loss  Legally blind  right eye  Atrial fibrillation  HTN (hypertension)  Hypercholesteremia  OA (osteoarthritis) of hip  H/O varicose veins  History of Cataract Surgery  right eye   H/O cystoscopy  TURP   H/O colonoscopy    Medications:  azithromycin  IVPB 500 milliGRAM(s) IV Intermittent once  azithromycin  IVPB      piperacillin/tazobactam IVPB.. 3.375 Gram(s) IV Intermittent every 8 hours  furosemide    Tablet 40 milliGRAM(s) Oral daily  metoprolol succinate  milliGRAM(s) Oral daily  albuterol/ipratropium for Nebulization 3 milliLiter(s) Nebulizer every 4 hours  acetaminophen     Tablet .. 650 milliGRAM(s) Oral every 6 hours PRN  melatonin 3 milliGRAM(s) Oral at bedtime PRN  ondansetron Injectable 4 milliGRAM(s) IV Push every 8 hours PRN  apixaban 5 milliGRAM(s) Oral every 12 hours  aluminum hydroxide/magnesium hydroxide/simethicone Suspension 30 milliLiter(s) Oral every 4 hours PRN  tamsulosin 0.4 milliGRAM(s) Oral at bedtime  methylPREDNISolone sodium succinate Injectable 40 milliGRAM(s) IV Push every 8 hours  folic acid Injectable 1 milliGRAM(s) IV Push daily  thiamine Injectable 100 milliGRAM(s) IV Push daily    ICU Vital Signs Last 24 Hrs  T(C): 36.8 (08 Oct 2023 22:13), Max: 38.9 (08 Oct 2023 12:10)  T(F): 98.2 (08 Oct 2023 22:13), Max: 102.1 (08 Oct 2023 12:10)  HR: 130 (08 Oct 2023 22:13) (62 - 130)  BP: 121/84 (08 Oct 2023 22:13) (105/71 - 126/76)  BP(mean): 95 (08 Oct 2023 22:13) (95 - 95)  RR: 25 (08 Oct 2023 22:13) (20 - 25)  SpO2: 94% (08 Oct 2023 22:13) (80% - 97%)  O2 Parameters below as of 08 Oct 2023 22:13  Patient On (Oxygen Delivery Method): BiPAP/CPAP    ABG - ( 08 Oct 2023 22:24 )  pH, Arterial: 7.35  pH, Blood: x     /  pCO2: 33    /  pO2: 64    / HCO3: 18    / Base Excess: -6.4  /  SaO2: 93        I&O's Detail    08 Oct 2023 07:01  -  08 Oct 2023 22:57  --------------------------------------------------------  IN:  Total IN: 0 mL    OUT:    Intermittent Catheterization - Urethral (mL): 400 mL  Total OUT: 400 mL    Total NET: -400 mL    LABS:                        14.1   7.27  )-----------( 157      ( 08 Oct 2023 12:12 )             41.3     10-08    130<L>  |  100  |  33<H>  ----------------------------<  96  4.3   |  22  |  1.15    Ca    9.3      08 Oct 2023 12:12    TPro  7.4  /  Alb  2.5<L>  /  TBili  0.9  /  DBili  x   /  AST  41<H>  /  ALT  17  /  AlkPhos  58  10-08    PT/INR - ( 08 Oct 2023 15:33 )   PT: 15.1 sec;   INR: 1.35 ratio    PTT - ( 08 Oct 2023 15:33 )  PTT:34.9 sec    Urinalysis Basic - ( 08 Oct 2023 12:12 )  Color: Dark Yellow / Appearance: Clear / S.013 / pH: x  Gluc: 96 mg/dL / Ketone: Negative mg/dL  / Bili: Negative / Urobili: 0.2 mg/dL   Blood: x / Protein: 100 mg/dL / Nitrite: Negative   Leuk Esterase: Small / RBC: 57 /HPF / WBC 5 /HPF   Sq Epi: x / Non Sq Epi: 1 /HPF / Bacteria: Few /HPF    CULTURES:  Rapid RVP Result: Detected (10-08 @ 12:12)    Physical Examination:  General: No acute distress.    HEENT: Pupils equal, reactive to light.  Symmetric.  PULM: Clear to auscultation bilaterally, no significant sputum production  NECK: Supple, no lymphadenopathy, trachea midline  CVS: Regular rate and rhythm, no murmurs, rubs, or gallops  ABD: Soft, nondistended, nontender, normoactive bowel sounds, no masses  EXT: No edema, nontender  SKIN: Warm and well perfused, no rashes noted.  NEURO: Alert, oriented, interactive, nonfocal  RADIOLOGY:   < from: Xray Chest 1 View- PORTABLE-Urgent (10.08.23 @ 12:23) >    ACC: [id number] EXAM:  XR CHEST PORTABLE URGENT 1V   ORDERED BY: GITA DAMON     PROCEDURE DATE:  10/08/2023      INTERPRETATION:  AP chest on 2023 at 12:20 PM. Patient has   sepsis.    Gross heart enlargement again noted similar to May 27 this year.    Present film shows a right lower lobe infiltrate.    IMPRESSION: Right lower lobe infiltrate new since prior. Gross heart   enlargement again noted.    --- End of Report ---    MCKENNA HICKS MD; Attending Radiologist  This document has been electronically signed. Oct  8 2023  9:06PM    < end of copied text >   Patient is a 82y old  Male who presents with a chief complaint of sob (08 Oct 2023 19:13)    BRIEF HOSPITAL COURSE:   Mr. 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 According to Ramona, daughter, patient lives at by himself. He has been coughing for a couple of days but today he has become confused and having visual hallucinations which is why she brought patient to ED. 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 Patient is able to tell me his name, his daughter's name, his birthday, the year, but thinks he is at home. Last alcoholic beverage was on 10/06/2023. Patient noted to have severe bronchospasms for which he was given nebulized treatments, placed on Bi-Level NIPPV for which I switched to AVAPS, and steroid therapy. Patient states his breathing is better, but he is also in afib w/RVR to 140s and tachypneic to mid 30s but does not seem to be using accessory muscles now. Ramona states he is not good with taking his medications, including his Eliquis. Patient could possibly have had cardioembolic event explaining confusion. Daughter also states last time patient was admitted also had hallucinations when he had cellulitis so there could be a component of metabolic encephalopathy. ABG is 7.35/33/64/18 on 50% FiO2. Chest xray shows dense R sided infiltrate. Patient is + for rhinovirus. Received Zosyn in ED. Ordered 5mg IVP metoprolol for Afib w/some relief and also ordered another dose of nebulized albuterol. Order CTA chest, b/l LE duplex, and TTE    Review of Systems:  Negative besides mentioned in HPI     PAST MEDICAL & SURGICAL HISTORY:  BPH (Benign Prostatic Hypertrophy)  laser   Obesity  Hearing loss  Legally blind  right eye  Atrial fibrillation  HTN (hypertension)  Hypercholesteremia  OA (osteoarthritis) of hip  H/O varicose veins  History of Cataract Surgery  right eye   H/O cystoscopy  TURP 2017  H/O colonoscopy    Medications:  azithromycin  IVPB 500 milliGRAM(s) IV Intermittent once  azithromycin  IVPB      piperacillin/tazobactam IVPB.. 3.375 Gram(s) IV Intermittent every 8 hours  furosemide    Tablet 40 milliGRAM(s) Oral daily  metoprolol succinate  milliGRAM(s) Oral daily  albuterol/ipratropium for Nebulization 3 milliLiter(s) Nebulizer every 4 hours  acetaminophen     Tablet .. 650 milliGRAM(s) Oral every 6 hours PRN  melatonin 3 milliGRAM(s) Oral at bedtime PRN  ondansetron Injectable 4 milliGRAM(s) IV Push every 8 hours PRN  apixaban 5 milliGRAM(s) Oral every 12 hours  aluminum hydroxide/magnesium hydroxide/simethicone Suspension 30 milliLiter(s) Oral every 4 hours PRN  tamsulosin 0.4 milliGRAM(s) Oral at bedtime  methylPREDNISolone sodium succinate Injectable 40 milliGRAM(s) IV Push every 8 hours  folic acid Injectable 1 milliGRAM(s) IV Push daily  thiamine Injectable 100 milliGRAM(s) IV Push daily    ICU Vital Signs Last 24 Hrs  T(C): 36.8 (08 Oct 2023 22:13), Max: 38.9 (08 Oct 2023 12:10)  T(F): 98.2 (08 Oct 2023 22:13), Max: 102.1 (08 Oct 2023 12:10)  HR: 130 (08 Oct 2023 22:13) (62 - 130)  BP: 121/84 (08 Oct 2023 22:13) (105/71 - 126/76)  BP(mean): 95 (08 Oct 2023 22:13) (95 - 95)  RR: 25 (08 Oct 2023 22:13) (20 - 25)  SpO2: 94% (08 Oct 2023 22:13) (80% - 97%)  O2 Parameters below as of 08 Oct 2023 22:13  Patient On (Oxygen Delivery Method): BiPAP/CPAP    ABG - ( 08 Oct 2023 22:24 )  pH, Arterial: 7.35  pH, Blood: x     /  pCO2: 33    /  pO2: 64    / HCO3: 18    / Base Excess: -6.4  /  SaO2: 93        I&O's Detail    08 Oct 2023 07:01  -  08 Oct 2023 22:57  --------------------------------------------------------  IN:  Total IN: 0 mL    OUT:    Intermittent Catheterization - Urethral (mL): 400 mL  Total OUT: 400 mL    Total NET: -400 mL    LABS:                        14.1   7.27  )-----------( 157      ( 08 Oct 2023 12:12 )             41.3     10-08    130<L>  |  100  |  33<H>  ----------------------------<  96  4.3   |  22  |  1.15    Ca    9.3      08 Oct 2023 12:12    TPro  7.4  /  Alb  2.5<L>  /  TBili  0.9  /  DBili  x   /  AST  41<H>  /  ALT  17  /  AlkPhos  58  10-08    PT/INR - ( 08 Oct 2023 15:33 )   PT: 15.1 sec;   INR: 1.35 ratio    PTT - ( 08 Oct 2023 15:33 )  PTT:34.9 sec    Urinalysis Basic - ( 08 Oct 2023 12:12 )  Color: Dark Yellow / Appearance: Clear / S.013 / pH: x  Gluc: 96 mg/dL / Ketone: Negative mg/dL  / Bili: Negative / Urobili: 0.2 mg/dL   Blood: x / Protein: 100 mg/dL / Nitrite: Negative   Leuk Esterase: Small / RBC: 57 /HPF / WBC 5 /HPF   Sq Epi: x / Non Sq Epi: 1 /HPF / Bacteria: Few /HPF    CULTURES:  Rapid RVP Result: Detected (10-08 @ 12:12)    Physical Examination:  General: No acute distress.    HEENT: Pupils equal, reactive to light.  Symmetric.  PULM: Diminished breath sounds b/l w/expiratory wheezing   NECK: Supple, no lymphadenopathy, trachea midline  CVS: Irregularly irregular rate and rhythm, no murmurs, rubs, or gallops  ABD: Soft, nondistended, nontender, normoactive bowel sounds, no masses  EXT: +1 edema b/l LE w/areas of ulcerations   SKIN: Warm and well perfused  RADIOLOGY:   < from: Xray Chest 1 View- PORTABLE-Urgent (10.08.23 @ 12:23) >    ACC: [id number] EXAM:  XR CHEST PORTABLE URGENT 1V   ORDERED BY: GITA DAMON     PROCEDURE DATE:  10/08/2023      INTERPRETATION:  AP chest on 2023 at 12:20 PM. Patient has   sepsis.    Gross heart enlargement again noted similar to May 27 this year.    Present film shows a right lower lobe infiltrate.    IMPRESSION: Right lower lobe infiltrate new since prior. Gross heart   enlargement again noted.    --- End of Report ---    MCKENNA HICKS MD; Attending Radiologist  This document has been electronically signed. Oct  8 2023  9:06PM    < end of copied text >

## 2023-10-08 NOTE — CONSULT NOTE ADULT - ASSESSMENT
83 y/o male with respiratory fialure secondary to rhino virus    PROBLEMS:    Acute respiratory failure with hypoxia  Asthmatic bronchitis   Chronic atrial fibrillation with RVR  BPH with urinary obstruction    PLAN:    IV solumedrol, now on solumedrol 40 mg q12h  Duoneb Q4HR  IV ABX GIVEN IN ER  Eliquis 5 mg po q12h  Chronic Goode placement, changed in the ED  Care discussed with daughter -  full code  DVT PROPHYLASIX

## 2023-10-08 NOTE — PROVIDER CONTACT NOTE (EICU) - RECOMMENDATIONS
ICU admission for multisystem presentation and increasing need for NIV  - steroids/nebs  - treatment for possible aspiration  - empiric abx  - continue eliquis  - afib RVR- metoprolol  - chronic crockett  - lasix    Case discussed with CLAYTON Thorne

## 2023-10-08 NOTE — CONSULT NOTE ADULT - ASSESSMENT
Assessment:  Mr. 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 According to Ramona, daughter, patient lives at by himself. He has been coughing for a couple of days but today he has become confused and having visual hallucinations which is why she brought patient to ED. 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 Patient is able to tell me his name, his daughter's name, his birthday, the year, but thinks he is at home. Last alcoholic beverage was on 10/06/2023. Patient noted to have severe bronchospasms for which he was given nebulized treatments, placed on Bi-Level NIPPV for which I switched to AVAPS, and steroid therapy. Patient states his breathing is better, but he is also in afib w/RVR to 140s and tachypneic to mid 30s but does not seem to be using accessory muscles now. Ramona states he is not good with taking his medications, including his Eliquis. Patient could possibly have had cardioembolic event explaining confusion. Daughter also states last time patient was admitted also had hallucinations when he had cellulitis so there could be a component of metabolic encephalopathy. ABG is 7.35/33/64/18 on 50% FiO2. Chest xray shows dense R sided infiltrate. Patient is + for rhinovirus. Received Zosyn in ED. Ordered 5mg IVP metoprolol for Afib w/some relief and also ordered another dose of nebulized albuterol. Order CTA chest, b/l LE duplex, and TTE    Problem List:  1. Acute hypoxic respiratory failure  2. Rhinovirus +  3. Pneumonia  4. Severely bronchospastic, no hx of asthma or COPD   5. Afib w/RVR   6. EtOH abuse  7. Hallucinations  8. Possible DTs  9. Possible metabolic encephalopathy  10. Possible cardioembolic event explaining confusion/hallucination     Plan:  Neuro: Monitor mental status, avoid deliriogenic medications. Pain & fever control as needed. Possible EtOH withdrawal. Place on CIWA, ativan PRN. Thiamine and folate. Will likely need TTE and MRI to r/o acute CVA from possible cardioembolic source given not taking AC daily and is in Afib w/RVR. Obtain Ammonia level in AM      CV: Afib w/RVR. Give Metoprolol 5mg IVP STAT. C/w home Metoprolol 100mg daily. Order TTE. Monitor HR and BP. Apixaban for AC. Patient also likely has some PVD based off LE exam     Pulm: Acute hypoxic respiratory failure requiring AVAPS NIPPV. Patient is at high risk for respiratory failure requiring intubation and mechanical intubation. Give more nebulized albuterol, steroid therapy, magnesium. Patient is very bronchospastic. Patient is Rhinovirus + and has R sided infiltrate on chest xray. Obtain CTA chest to r/o PE      Renal: Slight CRISPIN, increased from baseline. Strict I/Os, goal UOP >0.5cc/kg/hr. Trend renal function and electrolytes, replete as needed. Avoid nephrotoxic agents    GI: NPO on NIPPV. PPI     ID: Cover patient w/Zosyn and Azithromycin. Ordered infectious w/u. F/u blood, urine, and sputum cultures, MRSA swab, urine legionella and strep. Patient is RVP +. Trend WBC/fevers/procalcitonin     Heme: Obtain LE duplex to r/o LE DVT. Apixaban for AC     Endo: Goal blood glucose <180. AM TSH, Lipids, and A1c    Discussed w/eICU attending, Dr. Slater     CRITICAL CARE TIME SPENT: 55 minutes assessing presenting problems of acute illness, which pose high probability of life threatening deterioration or end organ damage/dysfunction, as well as medical decision making including initiating plan of care, reviewing data, reviewing radiologic exams, discussing with multidisciplinary team,  discussing goals of care with patient/family, and writing this note.  Non-inclusive of procedures performed

## 2023-10-08 NOTE — H&P ADULT - PROBLEM SELECTOR PLAN 4
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

## 2023-10-08 NOTE — ED ADULT NURSE NOTE - NSFALLHARMRISKINTERV_ED_ALL_ED
Assistance OOB with selected safe patient handling equipment if applicable/Assistance with ambulation/Communicate risk of Fall with Harm to all staff, patient, and family/Monitor gait and stability/Monitor for mental status changes and reorient to person, place, and time, as needed/Provide visual cue: red socks, yellow wristband, yellow gown, etc/Reinforce activity limits and safety measures with patient and family/Toileting schedule using arm’s reach rule for commode and bathroom/Use of alarms - bed, stretcher, chair and/or video monitoring/Bed in lowest position, wheels locked, appropriate side rails in place/Call bell, personal items and telephone in reach/Instruct patient to call for assistance before getting out of bed/chair/stretcher/Non-slip footwear applied when patient is off stretcher/[place] to call system/Physically safe environment - no spills, clutter or unnecessary equipment/Purposeful Proactive Rounding/Room/bathroom lighting operational, light cord in reach

## 2023-10-08 NOTE — PROVIDER CONTACT NOTE (EICU) - BACKGROUND
82-year-old male with past medical history of atrial fibrillation (Eliquis), (R) sided chronic visual loss, HLD, history of cellulitis, BPH admitted in the hospital with 3 days of increased coughing congestion fevers and hallucination.  According to the daughter, patient last couple days has been consistently coughing wheezing and being congested. Today patient started hallucinating and patient was subsequently brought in the hospital.  In the emergency department patient was noted to be extremely deconditioned and frail, continues coughing. RVP demonstrated - nate/entero viruses.

## 2023-10-08 NOTE — ED PROVIDER NOTE - ATTENDING CONTRIBUTION TO CARE
I, Douglas Zarate DO,  performed the initial face to face bedside interview with this patient regarding history of present illness, review of symptoms and relevant past medical, social and family history.  I completed an independent physical examination.  I was the initial provider who evaluated this patient. I have signed out the follow up of any pending tests (i.e. labs, radiological studies) to the resident.  I have communicated the patient’s plan of care and disposition with the resident.  The history, relevant review of systems, past medical and surgical history, medical decision making, and physical examination was documented by the scribe in my presence and I attest to the accuracy of the documentation.

## 2023-10-08 NOTE — ED PROVIDER NOTE - PHYSICAL EXAMINATION
GENERAL: alert, follows commands, no acute distress  HEENT: NCAT, EOMI, oral mucosa dry, normal conjunctiva  RESP: coarse breath sounds b/l, hypoxic requiring 2L NC to maintain SpO2 >92%  CV: tachycardic, irregularly irregular, no murmurs/rubs/gallops  ABDOMEN: soft, non-tender, non-distended, no guarding, crockett in place  MSK: no visible deformities  NEURO: no focal sensory or motor deficits, CN 2-12 grossly intact  SKIN: warm, normal color, well perfused, no rash, chronic LE venous changes  PSYCH: normal affect

## 2023-10-08 NOTE — H&P ADULT - HISTORY OF PRESENT ILLNESS
Patient is a 82-year-old male with past medical history of atrial fibrillation (Eliquis), HLD, history of cellulitis, BPH admitted in the hospital with 3 days of increased coughing congestion fevers and hallucination.  According to the daughter at the bedside patient last couple days has been consistently coughing wheezing and being congested.Today patient started hallucinating and patient was subsequently brought in the hospital.  In the emergency department patient was noted to be extremely deconditioned and frail, continues coughing. RVP demonstrated - nate/entero viruses.     ED course:  IV Solu-Medrol / DuoNebs have been initiated.  Patient currently on 5 L of O2.     Patient's current status is full code.        Patient is a 82-year-old male with past medical history of atrial fibrillation (Eliquis), HLD, history of cellulitis, BPH admitted in the hospital with 3 days of increased coughing congestion fevers and hallucination.  According to the daughter at the bedside patient last couple days has been consistently coughing wheezing and being congested.Today patient started hallucinating and patient was subsequently brought in the hospital.  In the emergency department patient was noted to be extremely deconditioned and frail, continues coughing. RVP demonstrated - nate/entero viruses.     ED course:  IV Solu-Medrol / DuoNebs have been initiated.  Patient currently on 5 L of O2.     Patient's current status is full code.    CT: No intracranial hemorrhage or acute transcortical infarct. 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 Interval progression of small vessel ischemic disease. Right vitreous hemorrhage and correlation with ophthalmologic history is recommended.        Patient is a 82-year-old male with past medical history of atrial fibrillation (Eliquis), (R) sided chronic visual loss, HLD, history of cellulitis, BPH admitted in the hospital with 3 days of increased coughing congestion fevers and hallucination.  According to the daughter at the bedside patient last couple days has been consistently coughing wheezing and being congested.Today patient started hallucinating and patient was subsequently brought in the hospital.  In the emergency department patient was noted to be extremely deconditioned and frail, continues coughing. RVP demonstrated - nate/entero viruses.     ED course:  IV Solu-Medrol / DuoNebs have been initiated.  Patient currently on 5 L of O2.     Patient's current status is full code.    CT: No intracranial hemorrhage or acute transcortical infarct. 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 Interval progression of small vessel ischemic disease. Right vitreous hemorrhage and correlation with ophthalmologic history is recommended.

## 2023-10-08 NOTE — ED PROVIDER NOTE - OBJECTIVE STATEMENT
83 yo male with PMHx OA of right hip, hypercholesteremia, cellulitis, HTN, Afib, and BPH presents to the ED c/o cough for the last 2 days, and onset of fever and hallucinations starting today. Family at bedside reports pt has been having recent stool incontinence. Reports pt is a daily drinker. Pt with two wounds on his b/l LE that she suspects are infected. Family states pt was hallucinating a woman laying on the floor in front of him and thinks he is living in his motor home in [place]. Family reports pt now currently lives at home by himself.

## 2023-10-08 NOTE — ED PROVIDER NOTE - PROGRESS NOTE DETAILS
Js Reyes MD (PGY-3) Admission for left lower lobe pneumonia.  Placed on CIWA protocol given history of alcohol withdrawal.

## 2023-10-08 NOTE — ED ADULT NURSE REASSESSMENT NOTE - NS ED NURSE REASSESS COMMENT FT1
Pt received in bed by AM RN Macie. Introduced self to pt and daughter and updated them on plan of care. Pt 's daughter verbalized understanding as pt is AOx1. Provided pt with sandwich as per diet orders. Pt awaiting to be admitted to tele at this time. Pt denies pain or discomfort at this time. Noted 16 [place] Goode bag along with garbled speech. Pt's daughter states that he had the garbled/wet cough since arrival. Informed by AM RN that pt is to potentially have chest physiotherapy to clear lungs. Bed at lowest height, pt on O2 NC, O2 sat ranging from 88-94% but pt asymptomatic. Yes

## 2023-10-08 NOTE — H&P ADULT - NSHPLABSRESULTS_GEN_ALL_CORE
CBC Full  -  ( 08 Oct 2023 12:12 )  WBC Count : 7.27 K/uL  RBC Count : 4.32 M/uL  Hemoglobin : 14.1 g/dL  Hematocrit : 41.3 %  Platelet Count - Automated : 157 K/uL    PT/INR - ( 08 Oct 2023 15:33 )   PT: 15.1 sec;   INR: 1.35 ratio         PTT - ( 08 Oct 2023 15:33 )  PTT:34.9 sec  Urinalysis Basic - ( 08 Oct 2023 12:12 )    Color: Dark Yellow / Appearance: Clear / S.013 / pH: x  Gluc: 96 mg/dL / Ketone: Negative mg/dL  / Bili: Negative / Urobili: 0.2 mg/dL   Blood: x / Protein: 100 mg/dL / Nitrite: Negative   Leuk Esterase: Small / RBC: 57 /HPF / WBC 5 /HPF   Sq Epi: x / Non Sq Epi: 1 /HPF / Bacteria: Few /HPF      10-08    130<L>  |  100  |  33<H>  ----------------------------<  96  4.3   |  22  |  1.15    Ca    9.3      08 Oct 2023 12:12    TPro  7.4  /  Alb  2.5<L>  /  TBili  0.9  /  DBili  x   /  AST  41<H>  /  ALT  17  /  AlkPhos  58  10-08

## 2023-10-08 NOTE — H&P ADULT - NSHPREVIEWOFSYSTEMS_GEN_ALL_CORE
A-fib likely cause of current decompensated HF  Has failed flecainide therapy as an OP  Will load with Amio and start gtt   Metoprolol on hold for now due to bradycardia, but will give amio only   Needs to resume Eliquis     5/22/19  Continue amio gtt for now   -Will continue to diurese today.   If patient doesn't convert to NSR, will plan for cardioversion on Friday   No need for LISSETH due to uninterrupted OAC with Eliquis  No BB due to bradycardia    REVIEW OF SYSTEMS:  General: NAD, hemodynamically stable, (-)  fever, (-) chills, (-) weakness  HEENT:  Eyes:  No visual loss, blurred vision, double vision or yellow sclerae. Ears, Nose, Throat:  No hearing loss, sneezing, congestion, runny nose or sore throat.  SKIN:  No rash or itching.  CARDIOVASCULAR:  No chest pain, chest pressure or chest discomfort. No palpitations or edema.  RESPIRATORY:  shortness of breath, congestion, wheezing.   GASTROINTESTINAL:  No anorexia, nausea, vomiting or diarrhea. No abdominal pain or blood.  NEUROLOGICAL:  No headache, dizziness, syncope, paralysis, ataxia, numbness or tingling in the extremities. No change in bowel or bladder control.  MUSCULOSKELETAL:  No muscle, back pain, joint pain or stiffness.  HEMATOLOGIC:  No anemia, bleeding or bruising.  LYMPHATICS:  No enlarged nodes. No history of splenectomy.  ENDOCRINOLOGIC:  No reports of sweating, cold or heat intolerance. No polyuria or polydipsia.  ALLERGIES:  No history of asthma, hives, eczema or rhinitis. REVIEW OF SYSTEMS:  General: febrile /  + weakness  HEENT:  Eyes:  No visual loss, blurred vision, double vision or yellow sclerae. Ears, Nose, Throat:  No hearing loss, sneezing, congestion, runny nose or sore throat.  SKIN:  No rash or itching.  CARDIOVASCULAR:  No chest pain, chest pressure or chest discomfort. No palpitations or edema.  RESPIRATORY:  shortness of breath, congestion, wheezing.   GASTROINTESTINAL:  No anorexia, nausea, vomiting or diarrhea. No abdominal pain or blood.  NEUROLOGICAL:  No headache, dizziness, syncope, paralysis, ataxia, numbness or tingling in the extremities. No change in bowel or bladder control.  MUSCULOSKELETAL:  No muscle, back pain, joint pain or stiffness.  HEMATOLOGIC:  No anemia, bleeding or bruising.  LYMPHATICS:  No enlarged nodes. No history of splenectomy.  ENDOCRINOLOGIC:  No reports of sweating, cold or heat intolerance. No polyuria or polydipsia.  ALLERGIES:  No history of asthma, hives, eczema or rhinitis.

## 2023-10-08 NOTE — ED ADULT TRIAGE NOTE - CHIEF COMPLAINT QUOTE
pt presents to ED due to complaints of SOB worsening lives at home alone, wheezing heard on inspiration and expiration pt has been sitting in his chair unable to get up and move about the last few days

## 2023-10-08 NOTE — H&P ADULT - NSHPPHYSICALEXAM_GEN_ALL_CORE
Physical Exam:   GENERAL APPEARANCE:  Very deocnditioned, frail, chronically sick  T(C): 37.6 (10-08-23 @ 14:53), Max: 38.9 (10-08-23 @ 12:10)  HR: 110 (10-08-23 @ 14:53) (62 - 123)  BP: 105/71 (10-08-23 @ 14:53) (105/71 - 120/68)  RR: 20 (10-08-23 @ 14:53) (20 - 24)  SpO2: 93% (10-08-23 @ 14:53) (80% - 93%)  Wt(kg): --  HEENT:  Head is normocephalic    Skin:  chronic skin tissue change, lower extremity hemosiderin deposition   NECK:  Supple without lymphadenopathy.   HEART:  Regular rate and rhythm. normal S1 and S2, No M/R/G  LUNGS: tachypnic /  tachycardic /  congestion /  + wheezing  ABDOMEN:  Soft, nontender, nondistended with good bowel sounds heard  EXTREMITIES:  chronic changes  NEUROLOGICAL:  Gross nonfocal Physical Exam:   GENERAL APPEARANCE:  Very deocnditioned, frail, chronically sick  T(C): 37.6 (10-08-23 @ 14:53), Max: 38.9 (10-08-23 @ 12:10)  HR: 110 (10-08-23 @ 14:53) (62 - 123)  BP: 105/71 (10-08-23 @ 14:53) (105/71 - 120/68)  RR: 20 (10-08-23 @ 14:53) (20 - 24)  SpO2: 93% (10-08-23 @ 14:53) (80% - 93%)  Wt(kg): --  HEENT: (R) sided legal blindness.   Skin:  chronic skin tissue change, lower extremity hemosiderin deposition   NECK:  Supple without lymphadenopathy.   HEART:  Regular rate and rhythm. normal S1 and S2, No M/R/G  LUNGS: tachypnic /  tachycardic /  congestion /  + wheezing  ABDOMEN:  Soft, nontender, nondistended with good bowel sounds heard  EXTREMITIES:  chronic changes  NEUROLOGICAL:  Gross nonfocal

## 2023-10-08 NOTE — ED PROVIDER NOTE - CLINICAL SUMMARY MEDICAL DECISION MAKING FREE TEXT BOX
82-year-old male with PMH HLD, HTN, A-fib, BPH, chronic EtOH abuse, presents to the emergency department for cough x2 days.  Daughter at bedside states patient also having fever and hallucinations that started today.  He was found to his house in an unchanged position, likely had not moved since her last on 2 days ago.  Unsure of last drink.  Patient noted to have a hoarse cough.  Patient with elevated heart rate 130 in rapid A-fib, febrile rectally, skin warm to touch.  Patient has coarse breath sounds diffusely without wheezing.  Heart is rapid and irregular.  Abdomen is soft nontender nondistended, lower extremities with chronic venous changes, Goode catheter in place.  Will evaluate for septic etiology such as aspiration pneumonia given alcohol history, hallucinations likely secondary to metabolic encephalopathy but may also be component of DT given alcohol abuse.  Will work-up with empiric antibiotics, IV fluid, lab with cultures.  If condition does not improve, consider adding benzos for potential EtOH withdrawal.  We will also give thiamine and folate, multivitamin.  Will require admission.

## 2023-10-08 NOTE — CONSULT NOTE ADULT - SUBJECTIVE AND OBJECTIVE BOX
HPI:    82-year-old male with past medical history of atrial fibrillation (Eliquis), (R) sided chronic visual loss, HLD, history of cellulitis, BPH admitted in the hospital with 3 days of increased coughing congestion fevers and hallucination.  According to the daughter at the bedside patient last couple days has been consistently coughing wheezing and being congested.Today patient started hallucinating and patient was subsequently brought in the hospital.  In the emergency department patient was noted to be extremely deconditioned and frail, continues coughing. RVP demonstrated - nate/entero viruses. In ED course:  IV Solu-Medrol / DuoNebs have been initiated.  Patient currently on 5 L of O2.  Patient's current status is full code. CT: No intracranial hemorrhage or acute transcortical infarct. 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 Interval progression of small vessel ischemic disease. Right vitreous hemorrhage and correlation with ophthalmologic history is recommended.       PAST MEDICAL & SURGICAL HISTORY:  BPH (Benign Prostatic Hypertrophy)  laser       Obesity      Hearing loss      Legally blind  right eye      Atrial fibrillation      HTN (hypertension)      Hypercholesteremia      OA (osteoarthritis) of hip      H/O varicose veins      History of Cataract Surgery  right eye       H/O cystoscopy  TURP 2017      H/O colonoscopy          Home Medications:  Eliquis 5 mg oral tablet: 1 tab(s) orally 2 times a day (08 Oct 2023 14:31)  furosemide 40 mg oral tablet: 1 tab(s) orally once a day (08 Oct 2023 14:31)  magnesium oxide 500 mg oral tablet: 1 tab(s) orally once a day (08 Oct 2023 14:37)  metoprolol succinate 100 mg oral tablet, extended release: 1 tab(s) orally once a day (08 Oct 2023 14:31)  oxyBUTYnin 15 mg/24 hr oral tablet, extended release: 1 tab(s) orally once a day (08 Oct 2023 14:37)  potassium chloride 20 mEq oral tablet, extended release: 1 tab(s) orally once a day (08 Oct 2023 14:31)  pravastatin 20 mg oral tablet: 1 tab(s) orally once a day (08 Oct 2023 14:31)  tamsulosin 0.4 mg oral capsule: 1 cap(s) orally once a day (08 Oct 2023 14:31)  Vitamin D3 1250 mcg (50,000 intl units) oral capsule: 1 cap(s) orally once a week (08 Oct 2023 14:37)      MEDICATIONS  (STANDING):  albuterol/ipratropium for Nebulization 3 milliLiter(s) Nebulizer every 4 hours  apixaban 5 milliGRAM(s) Oral every 12 hours  furosemide    Tablet 40 milliGRAM(s) Oral daily  methylPREDNISolone sodium succinate Injectable 40 milliGRAM(s) IV Push every 8 hours  metoprolol succinate  milliGRAM(s) Oral daily  potassium chloride    Tablet ER 20 milliEquivalent(s) Oral daily  tamsulosin 0.4 milliGRAM(s) Oral at bedtime    MEDICATIONS  (PRN):  acetaminophen     Tablet .. 650 milliGRAM(s) Oral every 6 hours PRN Temp greater or equal to 38C (100.4F), Mild Pain (1 - 3)  aluminum hydroxide/magnesium hydroxide/simethicone Suspension 30 milliLiter(s) Oral every 4 hours PRN Dyspepsia  melatonin 3 milliGRAM(s) Oral at bedtime PRN Insomnia  ondansetron Injectable 4 milliGRAM(s) IV Push every 8 hours PRN Nausea and/or Vomiting      Allergies    No Known Allergies    Intolerances        SOCIAL HISTORY: Denies tobacco, etoh abuse or illicit drug use    FAMILY HISTORY:  Family history of melanoma  father        Vital Signs Last 24 Hrs  T(C): 36.5 (08 Oct 2023 17:45), Max: 38.9 (08 Oct 2023 12:10)  T(F): 97.7 (08 Oct 2023 17:45), Max: 102.1 (08 Oct 2023 12:10)  HR: 119 (08 Oct 2023 17:45) (62 - 123)  BP: 126/76 (08 Oct 2023 17:45) (105/71 - 126/76)  BP(mean): --  RR: 23 (08 Oct 2023 17:45) (20 - 24)  SpO2: 97% (08 Oct 2023 17:45) (80% - 97%)    Parameters below as of 08 Oct 2023 17:45  Patient On (Oxygen Delivery Method): nasal cannula  O2 Flow (L/min): 4          REVIEW OF SYSTEMS:    CONSTITUTIONAL:  As per HPI.  HEENT:  Eyes:  No diplopia or blurred vision. ENT:  No earache, sore throat or runny nose.  CARDIOVASCULAR:  No pressure, squeezing, tightness, heaviness or aching about the chest, neck, axilla or epigastrium.  RESPIRATORY:  No cough, shortness of breath, PND or orthopnea.  GASTROINTESTINAL:  No nausea, vomiting or diarrhea.  GENITOURINARY:  No dysuria, frequency or urgency.  MUSCULOSKELETAL:  As per HPI.  SKIN:  No change in skin, hair or nails.  NEUROLOGIC:  No paresthesias, fasciculations, seizures or weakness.  PSYCHIATRIC:  No disorder of thought or mood.  ENDOCRINE:  No heat or cold intolerance, polyuria or polydipsia.  HEMATOLOGICAL:  No easy bruising or bleedings:  .     PHYSICAL EXAMINATION:    GENERAL APPEARANCE:  Pt. is not currently dyspneic, in no distress. Pt. is alert, oriented, and pleasant.  HEENT:  Pupils are normal and react normally. No icterus. Mucous membranes well colored.  NECK:  Supple. No lymphadenopathy. Jugular venous pressure not elevated. Carotids equal.   HEART:   The cardiac impulse has a normal quality. Regular. Normal S1 and S2. There are no murmurs, rubs or gallops noted  CHEST:  Chest is clear to auscultation. Normal respiratory effort.  ABDOMEN:  Soft and nontender.   EXTREMITIES:  There is no cyanosis, clubbing or edema.   SKIN:  No rash or significant lesions are noted.    LABS:                        14.1   7.27  )-----------( 157      ( 08 Oct 2023 12:12 )             41.3     10-08    130<L>  |  100  |  33<H>  ----------------------------<  96  4.3   |  22  |  1.15    Ca    9.3      08 Oct 2023 12:12    TPro  7.4  /  Alb  2.5<L>  /  TBili  0.9  /  DBili  x   /  AST  41<H>  /  ALT  17  /  AlkPhos  58  10-08    LIVER FUNCTIONS - ( 08 Oct 2023 12:12 )  Alb: 2.5 g/dL / Pro: 7.4 gm/dL / ALK PHOS: 58 U/L / ALT: 17 U/L / AST: 41 U/L / GGT: x           PT/INR - ( 08 Oct 2023 15:33 )   PT: 15.1 sec;   INR: 1.35 ratio         PTT - ( 08 Oct 2023 15:33 )  PTT:34.9 sec      Urinalysis Basic - ( 08 Oct 2023 12:12 )    Color: Dark Yellow / Appearance: Clear / S.013 / pH: x  Gluc: 96 mg/dL / Ketone: Negative mg/dL  / Bili: Negative / Urobili: 0.2 mg/dL   Blood: x / Protein: 100 mg/dL / Nitrite: Negative   Leuk Esterase: Small / RBC: 57 /HPF / WBC 5 /HPF   Sq Epi: x / Non Sq Epi: 1 /HPF / Bacteria: Few /HPF            RADIOLOGY & ADDITIONAL STUDIES:

## 2023-10-08 NOTE — H&P ADULT - PROBLEM SELECTOR PLAN 1
- tele monitoring as patient is atrial fibrillation with RVR / high RR  - started on IV solumedrol, now on solumedrol 40 mg q12h  - Duoneb treatment has been initiated  - patient is increased risk for cardiopulmonary collapse -  close monitoring recommended  - pulmonary follow up   - Care discussed with daughter -  full code

## 2023-10-08 NOTE — ED ADULT NURSE NOTE - OBJECTIVE STATEMENT
Pt presents to ED c/o shortness of breath, fever, altered mental status x couple of days. As per daughter "He has been very confused and started hallucinated today. Seeing things that are not there. He thinks we are home right now. He's been short of breath for a while and today he spiked a fever." Pt observed to be very tachypneic w/ SpO2 86-88% on room air. MD Zarate placed patient on 4L NC. Pt currently A&Ox1. Denies chest pain, shortness of breath, palpitations, diaphoresis, headaches, fevers, dizziness, nausea, vomiting, diarrhea, or urinary symptoms at this time. Pt arrives w/ 16Fr crockett- replaced in ED w/ 16Fr crockett, draining well at this time. Pt tolerated well. Pt observed to have skin tear to left buttock and stage 1 pressure ulcer to buttocks and sacral area. As per daughter patient drinks one drink of alcohol everyday. 2 IV's established in left arm with a 20G, labs drawn and sent, call bell in reach, warm blanket provided, bed in lowest position, side rails up x2, MD evaluation in progress, pt on telemetry.

## 2023-10-09 NOTE — CONSULT NOTE ADULT - SUBJECTIVE AND OBJECTIVE BOX
Patient is a 82y old  Male who presents with a chief complaint of sob    HPI:  83 y/o male with h/o obesity, OA, atrial fibrillation (Eliquis), (R) sided visual loss, HLD, prior cellulitis, BPH was admitted on 10/8 for increased coughing, congestion, SOB, fever and hallucination x 3 days. According to the daughter the patient has been coughing wheezing and being congested. On the day of admission, the patient started hallucinating and was subsequently brought in the hospital. In the ER, he was noted deconditioned and frail, continues coughing. He received zosyn and azithromycin.     PAST MEDICAL & SURGICAL HISTORY:  BPH (Benign Prostatic Hypertrophy) laser 2010  Obesity  Hearing loss  Legally blind right eye  Atrial fibrillation  HTN (hypertension)  Hypercholesteremia  OA (osteoarthritis) of hip  H/O varicose veins  History of Cataract Surgery right eye 2010  H/O cystoscopy  TURP 2017  H/O colonoscopy    Meds: per reconciliation sheet, noted below  MEDICATIONS  (STANDING):  apixaban 5 milliGRAM(s) Oral every 12 hours  azithromycin  IVPB 500 milliGRAM(s) IV Intermittent every 24 hours  azithromycin  IVPB      esmolol  Infusion 50 MICROgram(s)/kG/Min (24.5 mL/Hr) IV Continuous <Continuous>  folic acid Injectable 1 milliGRAM(s) IV Push daily  furosemide    Tablet 40 milliGRAM(s) Oral daily  levalbuterol Inhalation 1.25 milliGRAM(s) Inhalation every 4 hours  methylPREDNISolone sodium succinate Injectable 40 milliGRAM(s) IV Push every 8 hours  pantoprazole  Injectable 40 milliGRAM(s) IV Push daily  piperacillin/tazobactam IVPB.. 3.375 Gram(s) IV Intermittent every 8 hours  tamsulosin 0.4 milliGRAM(s) Oral at bedtime  thiamine Injectable 100 milliGRAM(s) IV Push daily    MEDICATIONS  (PRN):  acetaminophen     Tablet .. 650 milliGRAM(s) Oral every 6 hours PRN Temp greater or equal to 38C (100.4F), Mild Pain (1 - 3)  aluminum hydroxide/magnesium hydroxide/simethicone Suspension 30 milliLiter(s) Oral every 4 hours PRN Dyspepsia  melatonin 3 milliGRAM(s) Oral at bedtime PRN Insomnia  ondansetron Injectable 4 milliGRAM(s) IV Push every 8 hours PRN Nausea and/or Vomiting    Allergies    No Known Allergies    Intolerances      Social: no smoking, no alcohol, no illegal drugs; no recent travel, no exposure to TB  FAMILY HISTORY:  Family history of melanoma father    ROS: the patient denies fever, no chills, no HA, no seizures, no dizziness, no sore throat, no nasal congestion, no blurry vision, no CP, no palpitations, has SOB, has cough, no abdominal pain, no diarrhea, no N/V, no dysuria, no leg pain, no claudication, no rash, no joint aches, no rectal pain or bleeding, no night sweats  All other systems reviewed and are negative    Vital Signs Last 24 Hrs  T(C): 37.1 (09 Oct 2023 04:10), Max: 38.9 (08 Oct 2023 12:10)  T(F): 98.8 (09 Oct 2023 04:10), Max: 102.1 (08 Oct 2023 12:10)  HR: 105 (09 Oct 2023 06:47) (62 - 140)  BP: 113/72 (09 Oct 2023 06:00) (70/56 - 126/76)  BP(mean): 85 (09 Oct 2023 06:00) (62 - 98)  RR: 30 (09 Oct 2023 02:35) (20 - 38)  SpO2: 97% (09 Oct 2023 06:47) (80% - 97%)    Parameters below as of 09 Oct 2023 04:00  Patient On (Oxygen Delivery Method): BiPAP/CPAP    Daily Height in cm: 177.8 (08 Oct 2023 11:35)    Daily     PE:    Constitutional:  No acute distress  HEENT: NC/AT, EOMI, PERRLA, conjunctivae clear; ears and nose atraumatic; pharynx benign  Neck: supple; thyroid not palpable  Back: no tenderness  Respiratory: respiratory effort normal; crackles at bases  Cardiovascular: S1S2 regular, no murmurs  Abdomen: soft, not tender, not distended, positive BS; no liver or spleen organomegaly  Genitourinary: no suprapubic tenderness  Lymphatic: no LN palpable  Musculoskeletal: no muscle tenderness, no joint swelling or tenderness  Extremities: no pedal edema  Neurological/ Psychiatric: AxOx3, judgement and insight impaired; moving all extremities  Skin: no rashes; no palpable lesions    Labs: all available labs reviewed                        12.8   5.90  )-----------( 159      ( 09 Oct 2023 06:36 )             38.6     10-09    128<L>  |  102  |  35<H>  ----------------------------<  123<H>  4.5   |  20<L>  |  1.08    Ca    9.3      09 Oct 2023 06:36  Phos  3.3     10-09  Mg     3.2     10-09    TPro  6.7  /  Alb  2.3<L>  /  TBili  1.0  /  DBili  x   /  AST  100<H>  /  ALT  32  /  AlkPhos  43  10-09     LIVER FUNCTIONS - ( 09 Oct 2023 06:36 )  Alb: 2.3 g/dL / Pro: 6.7 gm/dL / ALK PHOS: 43 U/L / ALT: 32 U/L / AST: 100 U/L / GGT: x           Urinalysis (10-08 @ 12:12)  Protein, Urine: 100 mg/dL  Urine Appearance: Clear  Urine Microscopic-Add On (NC) (10-08 @ 12:12)  Red Blood Cell - Urine: 57 /HPF  White Blood Cell - Urine: 5 /HPF    Rhinovirus (10-08 @ 12:12)  Detected    Radiology: all available radiological tests reviewed    < from: US Duplex Venous Lower Ext Complete, Bilateral (10.09.23 @ 08:13) >  No evidence of deep venous thrombosis in either lower extremity.   Superficial right-sided venous thrombosis involving the lower greater saphenous vein.  < end of copied text >    < from: CT Angio Chest PE Protocol w/ IV Cont (10.08.23 @ 23:51) >  Suboptimal evaluation of the peripheral pulmonary arteries. No central pulmonary embolus.  Consolidative opacity at the right lower lobe, suspicious for pneumonia.   Small dependent left lower lobe opacity, which may represent atelectasis   vs pneumonia. Recommend clinical correlation and follow-up to resolution.  < end of copied text >    Advanced directives addressed: full resuscitation Patient is a 82y old  Male who presents with a chief complaint of sob    HPI:  81 y/o male with h/o obesity, OA, atrial fibrillation (Eliquis), (R) sided visual loss, HLD, prior cellulitis, BPH was admitted on 10/8 for increased coughing, congestion, SOB, fever and hallucination x 3 days. According to the daughter the patient has been coughing wheezing and being congested. On the day of admission, the patient started hallucinating and was subsequently brought in the hospital. In the ER, he was noted deconditioned and frail, continues coughing. He received zosyn and azithromycin.     PAST MEDICAL & SURGICAL HISTORY:  BPH (Benign Prostatic Hypertrophy) laser 2010  Obesity  Hearing loss  Legally blind right eye  Atrial fibrillation  HTN (hypertension)  Hypercholesteremia  OA (osteoarthritis) of hip  H/O varicose veins  History of Cataract Surgery right eye 2010  H/O cystoscopy  TURP 2017  H/O colonoscopy    Meds: per reconciliation sheet, noted below  MEDICATIONS  (STANDING):  apixaban 5 milliGRAM(s) Oral every 12 hours  azithromycin  IVPB 500 milliGRAM(s) IV Intermittent every 24 hours  azithromycin  IVPB      esmolol  Infusion 50 MICROgram(s)/kG/Min (24.5 mL/Hr) IV Continuous <Continuous>  folic acid Injectable 1 milliGRAM(s) IV Push daily  furosemide    Tablet 40 milliGRAM(s) Oral daily  levalbuterol Inhalation 1.25 milliGRAM(s) Inhalation every 4 hours  methylPREDNISolone sodium succinate Injectable 40 milliGRAM(s) IV Push every 8 hours  pantoprazole  Injectable 40 milliGRAM(s) IV Push daily  piperacillin/tazobactam IVPB.. 3.375 Gram(s) IV Intermittent every 8 hours  tamsulosin 0.4 milliGRAM(s) Oral at bedtime  thiamine Injectable 100 milliGRAM(s) IV Push daily    MEDICATIONS  (PRN):  acetaminophen     Tablet .. 650 milliGRAM(s) Oral every 6 hours PRN Temp greater or equal to 38C (100.4F), Mild Pain (1 - 3)  aluminum hydroxide/magnesium hydroxide/simethicone Suspension 30 milliLiter(s) Oral every 4 hours PRN Dyspepsia  melatonin 3 milliGRAM(s) Oral at bedtime PRN Insomnia  ondansetron Injectable 4 milliGRAM(s) IV Push every 8 hours PRN Nausea and/or Vomiting    Allergies    No Known Allergies    Intolerances      Social: no smoking, no alcohol, no illegal drugs; no recent travel, no exposure to TB  FAMILY HISTORY:  Family history of melanoma father    ROS: the patient is lethargic, limited  All other systems reviewed and are negative    Vital Signs Last 24 Hrs  T(C): 37.1 (09 Oct 2023 04:10), Max: 38.9 (08 Oct 2023 12:10)  T(F): 98.8 (09 Oct 2023 04:10), Max: 102.1 (08 Oct 2023 12:10)  HR: 105 (09 Oct 2023 06:47) (62 - 140)  BP: 113/72 (09 Oct 2023 06:00) (70/56 - 126/76)  BP(mean): 85 (09 Oct 2023 06:00) (62 - 98)  RR: 30 (09 Oct 2023 02:35) (20 - 38)  SpO2: 97% (09 Oct 2023 06:47) (80% - 97%)    Parameters below as of 09 Oct 2023 04:00  Patient On (Oxygen Delivery Method): BiPAP/CPAP    Daily Height in cm: 177.8 (08 Oct 2023 11:35)    Daily     PE:    Constitutional:  No acute distress  HEENT: NC/AT, EOMI, PERRLA, conjunctivae clear; ears and nose atraumatic; pharynx benign  Neck: supple; thyroid not palpable  Back: no tenderness  Respiratory: respiratory effort normal; crackles at bases  Cardiovascular: S1S2 regular, no murmurs  Abdomen: soft, not tender, not distended, positive BS; no liver or spleen organomegaly  Genitourinary: no suprapubic tenderness  Lymphatic: no LN palpable  Musculoskeletal: no muscle tenderness, no joint swelling or tenderness  Extremities: no pedal edema  Neurological/ Psychiatric: Alert, judgement and insight impaired; moving all extremities  Skin: no rashes; no palpable lesions    Labs: all available labs reviewed                        12.8   5.90  )-----------( 159      ( 09 Oct 2023 06:36 )             38.6     10-09    128<L>  |  102  |  35<H>  ----------------------------<  123<H>  4.5   |  20<L>  |  1.08    Ca    9.3      09 Oct 2023 06:36  Phos  3.3     10-09  Mg     3.2     10-09    TPro  6.7  /  Alb  2.3<L>  /  TBili  1.0  /  DBili  x   /  AST  100<H>  /  ALT  32  /  AlkPhos  43  10-09     LIVER FUNCTIONS - ( 09 Oct 2023 06:36 )  Alb: 2.3 g/dL / Pro: 6.7 gm/dL / ALK PHOS: 43 U/L / ALT: 32 U/L / AST: 100 U/L / GGT: x           Urinalysis (10-08 @ 12:12)  Protein, Urine: 100 mg/dL  Urine Appearance: Clear  Urine Microscopic-Add On (NC) (10-08 @ 12:12)  Red Blood Cell - Urine: 57 /HPF  White Blood Cell - Urine: 5 /HPF    Rhinovirus (10-08 @ 12:12)  Detected    Radiology: all available radiological tests reviewed    < from: US Duplex Venous Lower Ext Complete, Bilateral (10.09.23 @ 08:13) >  No evidence of deep venous thrombosis in either lower extremity.   Superficial right-sided venous thrombosis involving the lower greater saphenous vein.  < end of copied text >    < from: CT Angio Chest PE Protocol w/ IV Cont (10.08.23 @ 23:51) >  Suboptimal evaluation of the peripheral pulmonary arteries. No central pulmonary embolus.  Consolidative opacity at the right lower lobe, suspicious for pneumonia.   Small dependent left lower lobe opacity, which may represent atelectasis   vs pneumonia. Recommend clinical correlation and follow-up to resolution.  < end of copied text >    Advanced directives addressed: full resuscitation

## 2023-10-09 NOTE — PROGRESS NOTE ADULT - SUBJECTIVE AND OBJECTIVE BOX
Events Overnight: Patient required bipap overight    HPI:  Patient is a 82-year-old male with past medical history of atrial fibrillation (Eliquis), (R) sided chronic visual loss, HLD, history of cellulitis, BPH admitted in the hospital with 3 days of increased coughing congestion fevers and hallucination.  According to the daughter at the bedside patient last couple days has been consistently coughing wheezing and being congested.Today patient started hallucinating and patient was subsequently brought in the hospital.    CT chest showed dense left lower lobe infiltrate  ED course:  IV Solu-Medrol / DuoNebs have been initiated.  Patient currently on 5 L of O2. than required bipap     temp 99.9  in afib, with rvr, borderline bp    Patient's current status is full code.      MEDICATIONS  (STANDING):  apixaban 5 milliGRAM(s) Oral every 12 hours  azithromycin  IVPB 500 milliGRAM(s) IV Intermittent every 24 hours  azithromycin  IVPB      esmolol  Infusion 50 MICROgram(s)/kG/Min (24.5 mL/Hr) IV Continuous <Continuous>  folic acid Injectable 1 milliGRAM(s) IV Push daily  levalbuterol Inhalation 1.25 milliGRAM(s) Inhalation every 4 hours  methylPREDNISolone sodium succinate Injectable 40 milliGRAM(s) IV Push every 8 hours  pantoprazole  Injectable 40 milliGRAM(s) IV Push daily  piperacillin/tazobactam IVPB.. 3.375 Gram(s) IV Intermittent every 8 hours  tamsulosin 0.4 milliGRAM(s) Oral at bedtime  thiamine Injectable 100 milliGRAM(s) IV Push daily    MEDICATIONS  (PRN):  acetaminophen     Tablet .. 650 milliGRAM(s) Oral every 6 hours PRN Temp greater or equal to 38C (100.4F), Mild Pain (1 - 3)  aluminum hydroxide/magnesium hydroxide/simethicone Suspension 30 milliLiter(s) Oral every 4 hours PRN Dyspepsia  melatonin 3 milliGRAM(s) Oral at bedtime PRN Insomnia  ondansetron Injectable 4 milliGRAM(s) IV Push every 8 hours PRN Nausea and/or Vomiting      Height (cm): 177.8 (10-08 @ 11:35)  Weight (kg): 104.4 (10-09 @ 00:12)  BMI (kg/m2): 33 (10-09 @ 00:12)    ICU Vital Signs Last 24 Hrs  T(C): 37.1 (09 Oct 2023 04:10), Max: 38.9 (08 Oct 2023 12:10)  T(F): 98.8 (09 Oct 2023 04:10), Max: 102.1 (08 Oct 2023 12:10)  HR: 127 (09 Oct 2023 10:00) (62 - 140)  BP: 125/84 (09 Oct 2023 10:00) (70/56 - 150/134)  BP(mean): 97 (09 Oct 2023 10:00) (62 - 140)  ABP: --  ABP(mean): --  RR: 26 (09 Oct 2023 10:00) (20 - 38)  SpO2: 93% (09 Oct 2023 10:00) (80% - 98%)    O2 Parameters below as of 09 Oct 2023 10:00  Patient On (Oxygen Delivery Method): BiPAP/CPAP    Physical Exam    General ill   HEENT nc/at  neck supple, no jvd  lungs bilateral rhonchi  cv irreg, irreg  abdomen soft, non tender  extremities warm  gu voiding  neuro confused    I&O's Summary    08 Oct 2023 07:01  -  09 Oct 2023 07:00  --------------------------------------------------------  IN: 0 mL / OUT: 600 mL / NET: -600 mL                        12.8   5.90  )-----------( 159      ( 09 Oct 2023 06:36 )             38.6       10-09    128<L>  |  102  |  35<H>  ----------------------------<  123<H>  4.5   |  20<L>  |  1.08    Ca    9.3      09 Oct 2023 06:36  Phos  3.3     10-09  Mg     3.2     10-09    TPro  6.7  /  Alb  2.3<L>  /  TBili  1.0  /  DBili  x   /  AST  100<H>  /  ALT  32  /  AlkPhos  43  10-09    ABG - ( 08 Oct 2023 22:24 )  pH, Arterial: 7.35  pH, Blood: x     /  pCO2: 33    /  pO2: 64    / HCO3: 18    / Base Excess: -6.4  /  SaO2: 93        Urinalysis Basic - ( 09 Oct 2023 06:36 )    Color: x / Appearance: x / SG: x / pH: x  Gluc: 123 mg/dL / Ketone: x  / Bili: x / Urobili: x   Blood: x / Protein: x / Nitrite: x   Leuk Esterase: x / RBC: x / WBC x   Sq Epi: x / Non Sq Epi: x / Bacteria: x    DVT Prophylaxis:    heparin subq                                                             Advanced Directives: Full Code                      Events Overnight: Patient required bipap overight    HPI:  Patient is a 82-year-old male with past medical history of atrial fibrillation (Eliquis), (R) sided chronic visual loss, HLD, history of cellulitis, BPH admitted in the hospital with 3 days of increased coughing congestion fevers and hallucination.  According to the daughter at the bedside patient last couple days has been consistently coughing wheezing and being congested.Today patient started hallucinating and patient was subsequently brought in the hospital.    CT chest showed dense left lower lobe infiltrate  ED course:  IV Solu-Medrol / DuoNebs have been initiated.  Patient currently on 5 L of O2. than required bipap     temp 99.9  in afib, with rvr, borderline bp    Patient's current status is full code.      MEDICATIONS  (STANDING):  apixaban 5 milliGRAM(s) Oral every 12 hours  azithromycin  IVPB 500 milliGRAM(s) IV Intermittent every 24 hours  azithromycin  IVPB      esmolol  Infusion 50 MICROgram(s)/kG/Min (24.5 mL/Hr) IV Continuous <Continuous>  folic acid Injectable 1 milliGRAM(s) IV Push daily  levalbuterol Inhalation 1.25 milliGRAM(s) Inhalation every 4 hours  methylPREDNISolone sodium succinate Injectable 40 milliGRAM(s) IV Push every 8 hours  pantoprazole  Injectable 40 milliGRAM(s) IV Push daily  piperacillin/tazobactam IVPB.. 3.375 Gram(s) IV Intermittent every 8 hours  tamsulosin 0.4 milliGRAM(s) Oral at bedtime  thiamine Injectable 100 milliGRAM(s) IV Push daily    MEDICATIONS  (PRN):  acetaminophen     Tablet .. 650 milliGRAM(s) Oral every 6 hours PRN Temp greater or equal to 38C (100.4F), Mild Pain (1 - 3)  aluminum hydroxide/magnesium hydroxide/simethicone Suspension 30 milliLiter(s) Oral every 4 hours PRN Dyspepsia  melatonin 3 milliGRAM(s) Oral at bedtime PRN Insomnia  ondansetron Injectable 4 milliGRAM(s) IV Push every 8 hours PRN Nausea and/or Vomiting      Height (cm): 177.8 (10-08 @ 11:35)  Weight (kg): 104.4 (10-09 @ 00:12)  BMI (kg/m2): 33 (10-09 @ 00:12)    ICU Vital Signs Last 24 Hrs  T(C): 37.1 (09 Oct 2023 04:10), Max: 38.9 (08 Oct 2023 12:10)  T(F): 98.8 (09 Oct 2023 04:10), Max: 102.1 (08 Oct 2023 12:10)  HR: 127 (09 Oct 2023 10:00) (62 - 140)  BP: 125/84 (09 Oct 2023 10:00) (70/56 - 150/134)  BP(mean): 97 (09 Oct 2023 10:00) (62 - 140)  ABP: --  ABP(mean): --  RR: 26 (09 Oct 2023 10:00) (20 - 38)  SpO2: 93% (09 Oct 2023 10:00) (80% - 98%)    O2 Parameters below as of 09 Oct 2023 10:00  Patient On (Oxygen Delivery Method): BiPAP/CPAP    Physical Exam    General ill   HEENT nc/at  neck supple, no jvd  lungs bilateral rhonchi  cv irreg, irreg  abdomen soft, non tender  extremities warm  gu voiding  neuro confused    I&O's Summary    08 Oct 2023 07:01  -  09 Oct 2023 07:00  --------------------------------------------------------  IN: 0 mL / OUT: 600 mL / NET: -600 mL                        12.8   5.90  )-----------( 159      ( 09 Oct 2023 06:36 )             38.6       10-09    128<L>  |  102  |  35<H>  ----------------------------<  123<H>  4.5   |  20<L>  |  1.08    Ca    9.3      09 Oct 2023 06:36  Phos  3.3     10-09  Mg     3.2     10-09    TPro  6.7  /  Alb  2.3<L>  /  TBili  1.0  /  DBili  x   /  AST  100<H>  /  ALT  32  /  AlkPhos  43  10-09    ABG - ( 08 Oct 2023 22:24 )  pH, Arterial: 7.35  pH, Blood: x     /  pCO2: 33    /  pO2: 64    / HCO3: 18    / Base Excess: -6.4  /  SaO2: 93        Urinalysis Basic - ( 09 Oct 2023 06:36 )    Color: x / Appearance: x / SG: x / pH: x  Gluc: 123 mg/dL / Ketone: x  / Bili: x / Urobili: x   Blood: x / Protein: x / Nitrite: x   Leuk Esterase: x / RBC: x / WBC x   Sq Epi: x / Non Sq Epi: x / Bacteria: x    DVT Prophylaxis:  Apixiban for Afib                                              Advanced Directives: Full Code

## 2023-10-09 NOTE — PROGRESS NOTE ADULT - SUBJECTIVE AND OBJECTIVE BOX
Patient is a 82y old  Male who presents with a chief complaint of SOB (09 Oct 2023 17:18)    BRIEF HOSPITAL COURSE: ***    Events last 24 hours: ***    PAST MEDICAL & SURGICAL HISTORY:  BPH (Benign Prostatic Hypertrophy)  laser 2010  Obesity  Hearing loss  Legally blind  right eye  Atrial fibrillation  HTN (hypertension)  Hypercholesteremia  OA (osteoarthritis) of hip  H/O varicose veins  History of Cataract Surgery  right eye 2010  H/O cystoscopy  TURP 2017  H/O colonoscopy    Review of Systems:  Unable to obtain. Intubated/sedated.    Medications:  azithromycin  IVPB 500 milliGRAM(s) IV Intermittent every 24 hours  azithromycin  IVPB      cefepime  Injectable. 2000 milliGRAM(s) IV Push every 12 hours  metoprolol tartrate Injectable 2.5 milliGRAM(s) IV Push every 6 hours  levalbuterol Inhalation 1.25 milliGRAM(s) Inhalation every 4 hours  acetaminophen     Tablet .. 650 milliGRAM(s) Oral every 6 hours PRN  acetaminophen   Oral Liquid .. 650 milliGRAM(s) Oral every 6 hours PRN  LORazepam   Injectable 2 milliGRAM(s) IV Push every 4 hours PRN  melatonin 3 milliGRAM(s) Oral at bedtime PRN  ondansetron Injectable 4 milliGRAM(s) IV Push every 8 hours PRN  propofol Infusion 20 MICROgram(s)/kG/Min IV Continuous <Continuous>  apixaban 5 milliGRAM(s) Oral every 12 hours  aluminum hydroxide/magnesium hydroxide/simethicone Suspension 30 milliLiter(s) Oral every 4 hours PRN  pantoprazole  Injectable 40 milliGRAM(s) IV Push daily  tamsulosin 0.4 milliGRAM(s) Oral at bedtime  methylPREDNISolone sodium succinate Injectable 40 milliGRAM(s) IV Push every 8 hours  folic acid Injectable 1 milliGRAM(s) IV Push daily  thiamine Injectable 100 milliGRAM(s) IV Push daily  chlorhexidine 0.12% Liquid 15 milliLiter(s) Oral Mucosa every 12 hours    Mode: AC/ CMV (Assist Control/ Continuous Mandatory Ventilation)  RR (machine): 14  TV (machine): 470  FiO2: 80  PEEP: 6  ITime: 0.8  PIP: 21    ICU Vital Signs Last 24 Hrs  T(C): 35.8 (09 Oct 2023 21:00), Max: 38.7 (09 Oct 2023 17:30)  T(F): 96.5 (09 Oct 2023 21:00), Max: 101.6 (09 Oct 2023 17:30)  HR: 99 (09 Oct 2023 20:46) (99 - 140)  BP: 101/63 (09 Oct 2023 20:00) (70/56 - 150/134)  BP(mean): 76 (09 Oct 2023 20:00) (62 - 140)  ABP: --  ABP(mean): --  RR: 25 (09 Oct 2023 20:00) (12 - 38)  SpO2: 96% (09 Oct 2023 20:46) (89% - 98%)    O2 Parameters below as of 09 Oct 2023 20:46  Patient On (Oxygen Delivery Method): ventilator    ABG - ( 09 Oct 2023 21:10 )  pH, Arterial: 7.37  pH, Blood: x     /  pCO2: 37    /  pO2: 104   / HCO3: 21    / Base Excess: -3.4  /  SaO2: 99        I&O's Detail    08 Oct 2023 07:01  -  09 Oct 2023 07:00  --------------------------------------------------------  IN:  Total IN: 0 mL    OUT:    Indwelling Catheter - Urethral (mL): 200 mL    Intermittent Catheterization - Urethral (mL): 400 mL  Total OUT: 600 mL    Total NET: -600 mL    09 Oct 2023 07:01  -  09 Oct 2023 22:51  --------------------------------------------------------  IN:    IV PiggyBack: 100 mL    Propofol: 64 mL  Total IN: 164 mL    OUT:    Indwelling Catheter - Urethral (mL): 545 mL  Total OUT: 545 mL    Total NET: -381 mL    LABS:                        12.8   5.90  )-----------( 159      ( 09 Oct 2023 06:36 )             38.6     10-09    128<L>  |  102  |  35<H>  ----------------------------<  123<H>  4.5   |  20<L>  |  1.08    Ca    9.3      09 Oct 2023 06:36  Phos  3.3     10-09  Mg     3.2     10-09    TPro  6.7  /  Alb  2.3<L>  /  TBili  1.0  /  DBili  x   /  AST  100<H>  /  ALT  32  /  AlkPhos  43  10-09    CAPILLARY BLOOD GLUCOSE    PT/INR - ( 09 Oct 2023 06:36 )   PT: 14.6 sec;   INR: 1.30 ratio    PTT - ( 09 Oct 2023 06:36 )  PTT:34.0 sec    Urinalysis Basic - ( 09 Oct 2023 06:36 )  Color: x / Appearance: x / SG: x / pH: x  Gluc: 123 mg/dL / Ketone: x  / Bili: x / Urobili: x   Blood: x / Protein: x / Nitrite: x   Leuk Esterase: x / RBC: x / WBC x   Sq Epi: x / Non Sq Epi: x / Bacteria: x    CULTURES:  Rapid RVP Result: Detected (10-08 @ 12:12)  Culture Results:   No growth at 24 hours (10-08 @ 12:12)  Culture Results:   No growth at 24 hours (10-08 @ 12:12)    Physical Examination:    General: Well appearing, lying in bed in NAD.      HEENT: Pupils equal, reactive to light. Symmetric. No scleral icterus or injection.    PULM: Clear to auscultation B/L. No wheezes, rales, or rhonchi apprecaited. No significant sputum production or increased respiratory effort.    NECK: Supple, no lymphadenopathy, trachea midline.    CVS: Regular rate and rhythm, no murmurs appreciated, +s1/s2.    ABD: Soft, nondistended, nontender, normoactive bowel sounds.    EXT: No edema, nontender.    SKIN: Warm and well perfused, no rashes noted.    NEURO: Alert, oriented, interactive, nonfocal.    RADIOLOGY:  < from: CT Angio Chest PE Protocol w/ IV Cont (10.08.23 @ 23:51) >  ACC: [id number] EXAM:  CT ANGIO CHEST PULM [facility], later [facility]   ORDERED BY: JOSE OCONNOR     PROCEDURE DATE:  10/08/2023      INTERPRETATION:  CLINICAL INDICATION: AHRF, not taking a.c. sepsis.   Cough. Shortness of breath.    PROCEDURE: CT angiography of the chest was performed with intravenous   contrast utilizing dedicated PE protocol. Coronal and sagittal   reconstruction images were obtained. Axial MIP images were obtained from   a separate workstation.    CONTRAST/COMPLICATIONS:  IV Contrast: Omnipaque 350  90 ml.   10 ml discarded.  Oral Contrast: None  Complications: None    COMPARISON: 4/24/2021. 1/17/2023.    FINDINGS: Artifact the patient's arms respiratory motion degrades images.    LUNGS AND AIRWAYS: Patent central airways. Consolidative opacity at the   right lower lobe. Small dependent left lower lobe opacity.  PLEURA: No pleural effusion or pneumothorax.  HEART: Cardiomegaly. Trace pericardial effusion. Aortic valve, mitral   annular and coronary artery calcification.  VESSELS: Suboptimal contrast opacification of the segmental/subsegmental   pulmonary arteries. No central pulmonary embolus or secondary signs of   right heart strain. Main pulmonary artery enlargement (4.0 cm) suggesting   pulmonary hypertension. Atherosclerosis. Ectatic ascending aorta (4.0 cm).  MEDIASTINUM AND STEW: Subcentimeter lymph nodes.  CHEST WALL AND LOWER NECK: Thyroid gland degraded by artifact.  UPPER ABDOMEN: Small hepatic cyst again noted. Layering at the   gallbladder, which may be dueto sludge/stones. Fatty atrophy of the   visualized pancreas. Left perinephric stranding. Colon diverticulosis.  BONES: Degenerative changes/paravertebral ossification of the spine.   Chronic-appearing bilateral rib deformity.    IMPRESSION:    Suboptimal evaluation of the peripheral pulmonary arteries. No central   pulmonary embolus.    Consolidative opacity at the right lower lobe, suspicious for pneumonia.   Small dependent left lower lobe opacity, which may represent atelectasis   vs pneumonia. Recommend clinical correlation and follow-up to resolution.    Additional findings as described.    --- End of Report ---    MINNIE WEAVER MD; Attending Radiologist  This document has been electronically signed. Oct  9 2023  7:13AM    < end of copied text >    CRITICAL CARE TIME SPENT: 36 minutes   Patient is a 82y old  Male who presents with a chief complaint of SOB (09 Oct 2023 17:18)    BRIEF HOSPITAL COURSE: 81 y/o M with PMHx of a fib on eliquis, right-sided visual loss, HLD, and BPH who presented to ER on 10/9 complaining of chough, congestion, fever, and hallucinations. Pt found to be rhino/enterovirus positive. Admitted to medicine with acute hypoxic respiratory failure 2/2 PNA and a fib with RVR. Upgraded to ICU level of care later that evening in the setting of worsening respiratory status requiring initiation of NIV.    Events last 24 hours: Ultimately required intubation today. Alerted by RN overnight that pt was hypotensive (SBP 79) and has had minimal UOP since 19:00. 1 L IVF bolus ordered.    PAST MEDICAL & SURGICAL HISTORY:  BPH (Benign Prostatic Hypertrophy)  laser 2010  Obesity  Hearing loss  Legally blind  right eye  Atrial fibrillation  HTN (hypertension)  Hypercholesteremia  OA (osteoarthritis) of hip  H/O varicose veins  History of Cataract Surgery  right eye 2010  H/O cystoscopy  TURP 2017  H/O colonoscopy    Review of Systems:  Unable to obtain. Intubated/sedated.    Medications:  azithromycin  IVPB 500 milliGRAM(s) IV Intermittent every 24 hours  azithromycin  IVPB      cefepime  Injectable. 2000 milliGRAM(s) IV Push every 12 hours  metoprolol tartrate Injectable 2.5 milliGRAM(s) IV Push every 6 hours  levalbuterol Inhalation 1.25 milliGRAM(s) Inhalation every 4 hours  acetaminophen     Tablet .. 650 milliGRAM(s) Oral every 6 hours PRN  acetaminophen   Oral Liquid .. 650 milliGRAM(s) Oral every 6 hours PRN  LORazepam   Injectable 2 milliGRAM(s) IV Push every 4 hours PRN  melatonin 3 milliGRAM(s) Oral at bedtime PRN  ondansetron Injectable 4 milliGRAM(s) IV Push every 8 hours PRN  propofol Infusion 20 MICROgram(s)/kG/Min IV Continuous <Continuous>  apixaban 5 milliGRAM(s) Oral every 12 hours  aluminum hydroxide/magnesium hydroxide/simethicone Suspension 30 milliLiter(s) Oral every 4 hours PRN  pantoprazole  Injectable 40 milliGRAM(s) IV Push daily  tamsulosin 0.4 milliGRAM(s) Oral at bedtime  methylPREDNISolone sodium succinate Injectable 40 milliGRAM(s) IV Push every 8 hours  folic acid Injectable 1 milliGRAM(s) IV Push daily  thiamine Injectable 100 milliGRAM(s) IV Push daily  chlorhexidine 0.12% Liquid 15 milliLiter(s) Oral Mucosa every 12 hours    Mode: AC/ CMV (Assist Control/ Continuous Mandatory Ventilation)  RR (machine): 14  TV (machine): 470  FiO2: 80  PEEP: 6  ITime: 0.8  PIP: 21    ICU Vital Signs Last 24 Hrs  T(C): 35.8 (09 Oct 2023 21:00), Max: 38.7 (09 Oct 2023 17:30)  T(F): 96.5 (09 Oct 2023 21:00), Max: 101.6 (09 Oct 2023 17:30)  HR: 99 (09 Oct 2023 20:46) (99 - 140)  BP: 101/63 (09 Oct 2023 20:00) (70/56 - 150/134)  BP(mean): 76 (09 Oct 2023 20:00) (62 - 140)  ABP: --  ABP(mean): --  RR: 25 (09 Oct 2023 20:00) (12 - 38)  SpO2: 96% (09 Oct 2023 20:46) (89% - 98%)    O2 Parameters below as of 09 Oct 2023 20:46  Patient On (Oxygen Delivery Method): ventilator    ABG - ( 09 Oct 2023 21:10 )  pH, Arterial: 7.37  pH, Blood: x     /  pCO2: 37    /  pO2: 104   / HCO3: 21    / Base Excess: -3.4  /  SaO2: 99        I&O's Detail    08 Oct 2023 07:01  -  09 Oct 2023 07:00  --------------------------------------------------------  IN:  Total IN: 0 mL    OUT:    Indwelling Catheter - Urethral (mL): 200 mL    Intermittent Catheterization - Urethral (mL): 400 mL  Total OUT: 600 mL    Total NET: -600 mL    09 Oct 2023 07:01  -  09 Oct 2023 22:51  --------------------------------------------------------  IN:    IV PiggyBack: 100 mL    Propofol: 64 mL  Total IN: 164 mL    OUT:    Indwelling Catheter - Urethral (mL): 545 mL  Total OUT: 545 mL    Total NET: -381 mL    LABS:                        12.8   5.90  )-----------( 159      ( 09 Oct 2023 06:36 )             38.6     10-09    128<L>  |  102  |  35<H>  ----------------------------<  123<H>  4.5   |  20<L>  |  1.08    Ca    9.3      09 Oct 2023 06:36  Phos  3.3     10-09  Mg     3.2     10-09    TPro  6.7  /  Alb  2.3<L>  /  TBili  1.0  /  DBili  x   /  AST  100<H>  /  ALT  32  /  AlkPhos  43  10-09    CAPILLARY BLOOD GLUCOSE    PT/INR - ( 09 Oct 2023 06:36 )   PT: 14.6 sec;   INR: 1.30 ratio    PTT - ( 09 Oct 2023 06:36 )  PTT:34.0 sec    Urinalysis Basic - ( 09 Oct 2023 06:36 )  Color: x / Appearance: x / SG: x / pH: x  Gluc: 123 mg/dL / Ketone: x  / Bili: x / Urobili: x   Blood: x / Protein: x / Nitrite: x   Leuk Esterase: x / RBC: x / WBC x   Sq Epi: x / Non Sq Epi: x / Bacteria: x    CULTURES:  Rapid RVP Result: Detected (10-08 @ 12:12)  Culture Results:   No growth at 24 hours (10-08 @ 12:12)  Culture Results:   No growth at 24 hours (10-08 @ 12:12)    Physical Examination:    General: Intubated.     HEENT: Pupils equal, reactive to light. Symmetric. No scleral icterus or injection.    PULM: Diminished breath sounds b/l.    NECK: Supple, no lymphadenopathy, trachea midline.    CVS: Irregularly irregular rhythm.    ABD: Soft, nondistended, nontender, normoactive bowel sounds.    EXT: No edema, nontender.    SKIN: Warm and well perfused, no rashes noted.    NEURO: Sedated.    RADIOLOGY:  < from: CT Angio Chest PE Protocol w/ IV Cont (10.08.23 @ 23:51) >  ACC: [id number] EXAM:  CT ANGIO CHEST PULM [facility]   ORDERED BY: JOSE OCONNOR     PROCEDURE DATE:  10/08/2023      INTERPRETATION:  CLINICAL INDICATION: AHRF, not taking a.c. sepsis.   Cough. Shortness of breath.    PROCEDURE: CT angiography of the chest was performed with intravenous   contrast utilizing dedicated PE protocol. Coronal and sagittal   reconstruction images were obtained. Axial MIP images were obtained from   a separate workstation.    CONTRAST/COMPLICATIONS:  IV Contrast: Omnipaque 350  90 ml.   10 ml discarded.  Oral Contrast: None  Complications: None    COMPARISON: 4/24/2021. 1/17/2023.    FINDINGS: Artifact the patient's arms respiratory motion degrades images.    LUNGS AND AIRWAYS: Patent central airways. Consolidative opacity at the   right lower lobe. Small dependent left lower lobe opacity.  PLEURA: No pleural effusion or pneumothorax.  HEART: Cardiomegaly. Trace pericardial effusion. Aortic valve, mitral   annular and coronary artery calcification.  VESSELS: Suboptimal contrast opacification of the segmental/subsegmental   pulmonary arteries. No central pulmonary embolus or secondary signs of   right heart strain. Main pulmonary artery enlargement (4.0 cm) suggesting   pulmonary hypertension. Atherosclerosis. Ectatic ascending aorta (4.0 cm).  MEDIASTINUM AND STEW: Subcentimeter lymph nodes.  CHEST WALL AND LOWER NECK: Thyroid gland degraded by artifact.  UPPER ABDOMEN: Small hepatic cyst again noted. Layering at the   gallbladder, which may be dueto sludge/stones. Fatty atrophy of the   visualized pancreas. Left perinephric stranding. Colon diverticulosis.  BONES: Degenerative changes/paravertebral ossification of the spine.   Chronic-appearing bilateral rib deformity.    IMPRESSION:    Suboptimal evaluation of the peripheral pulmonary arteries. No central   pulmonary embolus.    Consolidative opacity at the right lower lobe, suspicious for pneumonia.   Small dependent left lower lobe opacity, which may represent atelectasis   vs pneumonia. Recommend clinical correlation and follow-up to resolution.    Additional findings as described.    --- End of Report ---    MINNIE WEAVER MD; Attending Radiologist  This document has been electronically signed. Oct  9 2023  7:13AM    < end of copied text >    CRITICAL CARE TIME SPENT: 36 minutes

## 2023-10-09 NOTE — PROGRESS NOTE ADULT - ASSESSMENT
Problem: Acute respiratory failure with hypoxia. temp to 00, dense infilrate      enterovirus,   ·  Plan: - tele monitoring as patient is atrial fibrillation with RVR / high RR  - started on IV solumedrol,    - Duoneb treatment has been initiated  - abx zosyn , zithromax       Problem/Plan - 2:  ·  Problem: Chronic atrial fibrillation with RVR.   ·  Plan: - Eliquis 5 mg po q12h  -  metoprolol    Problem/Plan - 3:  ·  Problem: BPH with urinary obstruction.   ·  Plan: - chronic Goode placement, changed in the ED.    Problem/Plan - 4:  ·  Problem: Hyponatremia.   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 Problem: Acute respiratory failure with hypoxia. temp to 00, dense infilrate      enterovirus,   ·  Plan: - tele monitoring as patient is atrial fibrillation with RVR / high RR  - started on IV solumedrol,    - Duoneb treatment has been initiated  - abx zosyn , zithromax       Problem/Plan - 2:  ·  Problem: Chronic atrial fibrillation with RVR.   ·  Plan: - Eliquis 5 mg po q12h  -  metoprolol    Problem/Plan - 3:  ·  Problem: BPH with urinary obstruction.   ·  Plan: - chronic Goode placement, changed in the ED.    Problem/Plan - 4:  ·  Problem: Hyponatremia.   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      addendum  Paitent with difficult to  control Afib, to 150  working to breathe,   cant come off bipap  thick secretions  d/w daughter,  we will proceed with intubation

## 2023-10-09 NOTE — PATIENT PROFILE ADULT - FALL HARM RISK - RISK INTERVENTIONS
Assistance OOB with selected safe patient handling equipment/Assistance with ambulation/Communicate Fall Risk and Risk Factors to all staff, patient, and family/Discuss with provider need for PT consult/Monitor gait and stability/Reinforce activity limits and safety measures with patient and family/Visual Cue: Yellow wristband/Bed in lowest position, wheels locked, appropriate side rails in place/Call bell, personal items and telephone in reach/Instruct patient to call for assistance before getting out of bed or chair/Non-slip footwear when patient is out of bed/[place] to call system/Physically safe environment - no spills, clutter or unnecessary equipment/Purposeful Proactive Rounding/Room/bathroom lighting operational, light cord in reach

## 2023-10-09 NOTE — PROGRESS NOTE ADULT - SUBJECTIVE AND OBJECTIVE BOX
Subjective:    pat on avap, desaturating & tachycardic & Tachpneic, now intubated, CT chest RLL pneumonia.    Home Medications:  Eliquis 5 mg oral tablet: 1 tab(s) orally 2 times a day (08 Oct 2023 14:31)  furosemide 40 mg oral tablet: 1 tab(s) orally once a day (08 Oct 2023 14:31)  magnesium oxide 500 mg oral tablet: 1 tab(s) orally once a day (08 Oct 2023 14:37)  metoprolol succinate 100 mg oral tablet, extended release: 1 tab(s) orally once a day (08 Oct 2023 14:31)  oxyBUTYnin 15 mg/24 hr oral tablet, extended release: 1 tab(s) orally once a day (08 Oct 2023 14:37)  potassium chloride 20 mEq oral tablet, extended release: 1 tab(s) orally once a day (08 Oct 2023 14:31)  pravastatin 20 mg oral tablet: 1 tab(s) orally once a day (08 Oct 2023 14:31)  tamsulosin 0.4 mg oral capsule: 1 cap(s) orally once a day (08 Oct 2023 14:31)  Vitamin D3 1250 mcg (50,000 intl units) oral capsule: 1 cap(s) orally once a week (08 Oct 2023 14:37)    MEDICATIONS  (STANDING):  apixaban 5 milliGRAM(s) Oral every 12 hours  azithromycin  IVPB 500 milliGRAM(s) IV Intermittent every 24 hours  azithromycin  IVPB      cefepime  Injectable. 2000 milliGRAM(s) IV Push every 12 hours  chlorhexidine 0.12% Liquid 15 milliLiter(s) Oral Mucosa every 12 hours  folic acid Injectable 1 milliGRAM(s) IV Push daily  levalbuterol Inhalation 1.25 milliGRAM(s) Inhalation every 4 hours  methylPREDNISolone sodium succinate Injectable 40 milliGRAM(s) IV Push every 8 hours  metoprolol tartrate Injectable 2.5 milliGRAM(s) IV Push every 6 hours  pantoprazole  Injectable 40 milliGRAM(s) IV Push daily  propofol Infusion 20 MICROgram(s)/kG/Min (12.5 mL/Hr) IV Continuous <Continuous>  tamsulosin 0.4 milliGRAM(s) Oral at bedtime  thiamine Injectable 100 milliGRAM(s) IV Push daily    MEDICATIONS  (PRN):  acetaminophen     Tablet .. 650 milliGRAM(s) Oral every 6 hours PRN Temp greater or equal to 38C (100.4F), Mild Pain (1 - 3)  acetaminophen   Oral Liquid .. 650 milliGRAM(s) Oral every 6 hours PRN Temp greater or equal to 38.5C (101.3F)  aluminum hydroxide/magnesium hydroxide/simethicone Suspension 30 milliLiter(s) Oral every 4 hours PRN Dyspepsia  melatonin 3 milliGRAM(s) Oral at bedtime PRN Insomnia  ondansetron Injectable 4 milliGRAM(s) IV Push every 8 hours PRN Nausea and/or Vomiting      Allergies    No Known Allergies    Intolerances        Vital Signs Last 24 Hrs  T(C): 37.3 (09 Oct 2023 15:00), Max: 38.5 (09 Oct 2023 13:00)  T(F): 99.1 (09 Oct 2023 15:00), Max: 101.3 (09 Oct 2023 13:00)  HR: 116 (09 Oct 2023 15:00) (100 - 140)  BP: 93/67 (09 Oct 2023 15:00) (70/56 - 150/134)  BP(mean): 76 (09 Oct 2023 15:00) (62 - 140)  RR: 25 (09 Oct 2023 15:00) (12 - 38)  SpO2: 93% (09 Oct 2023 15:00) (89% - 98%)    Parameters below as of 09 Oct 2023 14:00  Patient On (Oxygen Delivery Method): ventilator    O2 Concentration (%): 100  Mode: AC/ CMV (Assist Control/ Continuous Mandatory Ventilation)  RR (machine): 14  TV (machine): 470  FiO2: 80  PEEP: 6  ITime: 1  PIP: 19      PHYSICAL EXAMINATION:    NECK:  Supple. No lymphadenopathy. Jugular venous pressure not elevated. Carotids equal.   HEART:   The cardiac impulse has a normal quality. Reg., Nl S1 and S2.  There are no murmurs, rubs or gallops noted  CHEST:  Chest crackles to auscultation. Normal respiratory effort.  ABDOMEN:  Soft and nontender.   EXTREMITIES:  There is no edema.       LABS:                        12.8   5.90  )-----------( 159      ( 09 Oct 2023 06:36 )             38.6     10-09    128<L>  |  102  |  35<H>  ----------------------------<  123<H>  4.5   |  20<L>  |  1.08    Ca    9.3      09 Oct 2023 06:36  Phos  3.3     10-09  Mg     3.2     10-09    TPro  6.7  /  Alb  2.3<L>  /  TBili  1.0  /  DBili  x   /  AST  100<H>  /  ALT  32  /  AlkPhos  43  10-09    PT/INR - ( 09 Oct 2023 06:36 )   PT: 14.6 sec;   INR: 1.30 ratio         PTT - ( 09 Oct 2023 06:36 )  PTT:34.0 sec  Urinalysis Basic - ( 09 Oct 2023 06:36 )    Color: x / Appearance: x / SG: x / pH: x  Gluc: 123 mg/dL / Ketone: x  / Bili: x / Urobili: x   Blood: x / Protein: x / Nitrite: x   Leuk Esterase: x / RBC: x / WBC x   Sq Epi: x / Non Sq Epi: x / Bacteria: x        CT Angio Chest PE Protocol w/ IV Cont (10.08.23 @ 23:51) >  IMPRESSION:    Suboptimal evaluation of the peripheral pulmonary arteries. No central   pulmonary embolus.    Consolidative opacity at the right lower lobe, suspicious for pneumonia.   Small dependent left lower lobe opacity, which may represent atelectasis   vs pneumonia. Recommend clinical correlation and follow-up to resolution.    US Duplex Venous Lower Ext Complete, Bilateral (10.09.23 @ 08:13) >  IMPRESSION:  No evidence of deep venous thrombosis in either lower extremity.   Superficial right-sided venous thrombosis involving the lower greater   saphenous vein.

## 2023-10-09 NOTE — PROGRESS NOTE ADULT - ASSESSMENT
81 y/o male with respiratory fialure secondary to rhino virus    PROBLEMS:    Acute respiratory failure with hypoxia  Asthmatic bronchitis/Febrile syndrome-Probable sepsis-RLL pneumonia  Metabolic encephalopathy.   Chronic atrial fibrillation with RVR  BPH with urinary obstruction    PLAN:    vent care  BC x 2, urine c/s  Iv cefepime 2 gm q12h and azithromycin 500 mg IV qd  IV solumedrol 40 mg q8h  Duoneb Q4HR  Eliquis 5 mg po q12h  Chronic Goode placement, changed in the ED  D/w staff  DVT PROPHYLASIX

## 2023-10-09 NOTE — PROGRESS NOTE ADULT - ASSESSMENT
83 y/o M with PMHx of a fib on eliquis, right-sided visual loss, HLD, and BPH admitted with:    Acute hypoxic respiratory failure  Lobar PNA  Rhino/enterovirus infection  A fib with RVR  Hypotension    PLAN:  - 1 L IVF bolus STAT. If blood pressure does not respond, will initiate vasopressor infusion in order to maintain MAP > 65.  - Actively titrating ventilator settings to maintain SpO2 > 92% and adequate minute ventilation.  - Post intubation ABG within normal limits.  - Steroids plus duonebs.  - Abx coverage with zosyn and zithromax.  - Urine legionella positive.  - Follow up sputum culture.  - Blood cultures unremarkable thus far.  - Sedated with propofol to maintain ventilator synchrony.  - Lopressor 2.5mg IV q6 hrs with hold parameters.  - Full dose anticoagulation with eliquis.  - GI prophylaxis with protonix.  - Start tube feeds.

## 2023-10-09 NOTE — CONSULT NOTE ADULT - ASSESSMENT
83 y/o male with h/o obesity, OA, atrial fibrillation (Eliquis), (R) sided visual loss, HLD, prior cellulitis, BPH was admitted on 10/8 for increased coughing, congestion, SOB, fever and hallucination x 3 days. According to the daughter the patient has been coughing wheezing and being congested. On the day of admission, the patient started hallucinating and was subsequently brought in the hospital. In the ER, he was noted deconditioned and frail, continues coughing. He received zosyn and azithromycin.     1. Febrile syndrome. Probable sepsis. Acute respiratory failure. RUL pneumonia. Metabolic encephalopathy.  -obtain BC x 2, urine c/s  -start   83 y/o male with h/o obesity, OA, atrial fibrillation (Eliquis), (R) sided visual loss, HLD, prior cellulitis, BPH was admitted on 10/8 for increased coughing, congestion, SOB, fever and hallucination x 3 days. According to the daughter the patient has been coughing wheezing and being congested. On the day of admission, the patient started hallucinating and was subsequently brought in the hospital. In the ER, he was noted deconditioned and frail, continues coughing. He received zosyn and azithromycin.     1. Febrile syndrome. Probable sepsis. Acute respiratory failure. RUL pneumonia. Metabolic encephalopathy.  -obtain BC x 2, urine c/s  -start cefepime 2 gm IV q12h and azithromycin 500 mg IV qd  -reason for abx use and side effects reviewed with patient; monitor BMP   -old chart reviewed to assess prior cultures  -respiratory care  -O2 therapy  -monitor temps  -f/u CBC  -supportive care  2. Other issues:   -care per medicine

## 2023-10-10 NOTE — PROGRESS NOTE ADULT - SUBJECTIVE AND OBJECTIVE BOX
Patient with progressive sob  worsening hypoxia  PEEP was increased.  given dose of Paralytic,  was going to Line, and prone  when patient became unable to oxygenate, refractory to pressors  with bradycardia  family at bedside  decision made not to perform CPR  Patient  at 13:11pm

## 2023-10-10 NOTE — DIETITIAN INITIAL EVALUATION ADULT - PERTINENT MEDS FT
MEDICATIONS  (STANDING):  apixaban 5 milliGRAM(s) Oral every 12 hours  azithromycin  IVPB 500 milliGRAM(s) IV Intermittent every 24 hours  azithromycin  IVPB      cefepime  Injectable. 2000 milliGRAM(s) IV Push every 12 hours  chlorhexidine 0.12% Liquid 15 milliLiter(s) Oral Mucosa every 12 hours  cisatracurium Infusion 3 MICROgram(s)/kG/Min (18.8 mL/Hr) IV Continuous <Continuous>  esmolol  Infusion 50 MICROgram(s)/kG/Min (31.3 mL/Hr) IV Continuous <Continuous>  folic acid Injectable 1 milliGRAM(s) IV Push daily  levalbuterol Inhalation 1.25 milliGRAM(s) Inhalation every 4 hours  metoprolol tartrate 25 milliGRAM(s) Oral two times a day  pantoprazole   Suspension 40 milliGRAM(s) Oral daily  phenylephrine    Infusion 1 MICROgram(s)/kG/Min (39.2 mL/Hr) IV Continuous <Continuous>  propofol Infusion 20 MICROgram(s)/kG/Min (12.5 mL/Hr) IV Continuous <Continuous>  rocuronium Injectable 40 milliGRAM(s) IV Push once  tamsulosin 0.4 milliGRAM(s) Oral at bedtime  thiamine Injectable 100 milliGRAM(s) IV Push daily    MEDICATIONS  (PRN):  acetaminophen     Tablet .. 650 milliGRAM(s) Oral every 6 hours PRN Temp greater or equal to 38C (100.4F), Mild Pain (1 - 3)  acetaminophen   Oral Liquid .. 650 milliGRAM(s) Oral every 6 hours PRN Temp greater or equal to 38.5C (101.3F)  aluminum hydroxide/magnesium hydroxide/simethicone Suspension 30 milliLiter(s) Oral every 4 hours PRN Dyspepsia  LORazepam   Injectable 2 milliGRAM(s) IV Push every 4 hours PRN Agitation  melatonin 3 milliGRAM(s) Oral at bedtime PRN Insomnia  ondansetron Injectable 4 milliGRAM(s) IV Push every 8 hours PRN Nausea and/or Vomiting

## 2023-10-10 NOTE — DISCHARGE NOTE FOR THE EXPIRED PATIENT - HOSPITAL COURSE
82 year old with hx. Atrial fibrillation, admitted with severe hypoxia  was on Zosyn, zithromax  afib was difficult to control, on esmolol  was intubated  postiive for Legionella  worsening and refractory hypoxia  Family made him DNR and he  at 13:11 pm

## 2023-10-10 NOTE — DIETITIAN INITIAL EVALUATION ADULT - ADD RECOMMEND
1) Initiate TF regimen above  2) Maintain aspiration precautions, back of bed >45 degrees.  3) Monitor daily lytes/min and replete prn   4) Monitor daily wt to track/trend changes; strict I/Os   5) Monitor bowel movements, if no BM for >3 days, consider implementing bowel regimen.   RD will continue to monitor TF tolerance, labs, hydration, and wt prn.

## 2023-10-10 NOTE — PROGRESS NOTE ADULT - SUBJECTIVE AND OBJECTIVE BOX
Events Overnight: Patient on vent on 80% oxygen, remains, sedated, Urine legionella positive, inc creatinine to 1.5    HPI:    Patient is a 82-year-old male with past medical history of atrial fibrillation (Eliquis), (R) sided chronic visual loss, HLD, history of cellulitis, BPH admitted in the hospital with 3 days of increased coughing congestion fevers and hallucination.    CT chest showed dense left lower lobe infiltrate   in afib, with rvr, borderline bp  Urine Legionella positive  intubated 10/09  sputum gram negative rods, few gram positive    urine output 820 last 24 hours     PMH:       as above       MEDICATIONS  (STANDING):  apixaban 5 milliGRAM(s) Oral every 12 hours  azithromycin  IVPB 500 milliGRAM(s) IV Intermittent every 24 hours  azithromycin  IVPB      cefepime  Injectable. 2000 milliGRAM(s) IV Push every 12 hours  chlorhexidine 0.12% Liquid 15 milliLiter(s) Oral Mucosa every 12 hours  folic acid Injectable 1 milliGRAM(s) IV Push daily  levalbuterol Inhalation 1.25 milliGRAM(s) Inhalation every 4 hours  metoprolol tartrate 25 milliGRAM(s) Oral two times a day  pantoprazole   Suspension 40 milliGRAM(s) Oral daily  propofol Infusion 20 MICROgram(s)/kG/Min (12.5 mL/Hr) IV Continuous <Continuous>  tamsulosin 0.4 milliGRAM(s) Oral at bedtime  thiamine Injectable 100 milliGRAM(s) IV Push daily    MEDICATIONS  (PRN):  acetaminophen     Tablet .. 650 milliGRAM(s) Oral every 6 hours PRN Temp greater or equal to 38C (100.4F), Mild Pain (1 - 3)  acetaminophen   Oral Liquid .. 650 milliGRAM(s) Oral every 6 hours PRN Temp greater or equal to 38.5C (101.3F)  aluminum hydroxide/magnesium hydroxide/simethicone Suspension 30 milliLiter(s) Oral every 4 hours PRN Dyspepsia  LORazepam   Injectable 2 milliGRAM(s) IV Push every 4 hours PRN Agitation  melatonin 3 milliGRAM(s) Oral at bedtime PRN Insomnia  ondansetron Injectable 4 milliGRAM(s) IV Push every 8 hours PRN Nausea and/or Vomiting    ICU Vital Signs Last 24 Hrs  T(C): 36.4 (10 Oct 2023 05:29), Max: 38.7 (09 Oct 2023 17:30)  T(F): 97.6 (10 Oct 2023 05:29), Max: 101.6 (09 Oct 2023 17:30)  HR: 105 (10 Oct 2023 07:00) (89 - 139)  BP: 134/88 (10 Oct 2023 07:00) (78/52 - 150/134)  BP(mean): 101 (10 Oct 2023 07:00) (61 - 140)  ABP: --  ABP(mean): --  RR: 23 (10 Oct 2023 07:00) (12 - 35)  SpO2: 95% (10 Oct 2023 07:00) (89% - 98%)    O2 Parameters below as of 10 Oct 2023 07:00  Patient On (Oxygen Delivery Method): conventional ventilator    O2 Concentration (%): 80        Mode: AC/ CMV (Assist Control/ Continuous Mandatory Ventilation)  RR (machine): 14  TV (machine): 470  FiO2: 80  PEEP: 6  ITime: 1  PIP: 21      I&O's Summary    09 Oct 2023 07:01  -  10 Oct 2023 07:00  --------------------------------------------------------  IN: 1489 mL / OUT: 820 mL / NET: 669 mL    Physical Exam    General - ill  HEENT nc/at ng tube, og tube  neuro sedated  neck supple  lungs bilateral rhonchi  cv irreg,irreg  abdomen soft, non tender  gu crockett  extremtieis warm                        13.2   9.71  )-----------( 169      ( 10 Oct 2023 06:23 )             40.9     10-10    132<L>  |  103  |  51<H>  ----------------------------<  161<H>  3.7   |  21<L>  |  1.46<H>    Ca    9.9      10 Oct 2023 06:23  Phos  3.3     10-09  Mg     3.2     10-09    TPro  6.7  /  Alb  2.3<L>  /  TBili  1.0  /  DBili  x   /  AST  100<H>  /  ALT  32  /  AlkPhos  43  10-09    ABG - ( 09 Oct 2023 21:10 )  pH, Arterial: 7.37  pH, Blood: x     /  pCO2: 37    /  pO2: 104   / HCO3: 21    / Base Excess: -3.4  /  SaO2: 99        Urinalysis Basic - ( 10 Oct 2023 06:23 )    Color: x / Appearance: x / SG: x / pH: x  Gluc: 161 mg/dL / Ketone: x  / Bili: x / Urobili: x   Blood: x / Protein: x / Nitrite: x   Leuk Esterase: x / RBC: x / WBC x   Sq Epi: x / Non Sq Epi: x / Bacteria: x    DVT Prophylaxis:  APixiban                                                               Advanced Directives: Full Code

## 2023-10-10 NOTE — DIETITIAN INITIAL EVALUATION ADULT - OTHER INFO
81 y/o male with past medical history of atrial fibrillation (Eliquis), (R) sided chronic visual loss, HLD, history of cellulitis, BPH admitted in the hospital with 3 days of increased coughing congestion fevers and hallucination.  According to the daughter at the bedside patient last couple days has been consistently coughing wheezing and being congested. Today patient started hallucinating and patient was subsequently brought in the hospital.  In the emergency department patient was noted to be extremely deconditioned and frail, continues coughing. RVP demonstrated - nate/entero viruses. Patient is at high risk for respiratory failure requiring intubation and mechanical intubation, transferred to CCU.    Pt known to nutr services. Unable to obtain info during assessment 2/2 intubated and sedated w/ propofol @ 12.5 mL/hr (providing 330 kcal). Wt hx reviewed: 232# on 5/30/23. Bed scale wt of 226# taken by RD on 10/10; 6# wt loss/ 2.58% x 5 months - not clinically significant. NFPE reveals varying degrees of muscle/fat wasting. Will initiate TF today - see below for TF recommendations.

## 2023-10-10 NOTE — DIETITIAN INITIAL EVALUATION ADULT - NSFNSGIIOFT_GEN_A_CORE
I&O's Detail    09 Oct 2023 07:01  -  10 Oct 2023 07:00  --------------------------------------------------------  IN:    IV PiggyBack: 350 mL    Lactated Ringers Bolus: 1000 mL    Propofol: 139 mL  Total IN: 1489 mL    OUT:    Indwelling Catheter - Urethral (mL): 820 mL  Total OUT: 820 mL    Total NET: 669 mL

## 2023-10-10 NOTE — PROGRESS NOTE ADULT - SUBJECTIVE AND OBJECTIVE BOX
Subjective:    pat on vent, fio2 80%, tachcardic & tachpneic.    Home Medications:  Eliquis 5 mg oral tablet: 1 tab(s) orally 2 times a day (08 Oct 2023 14:31)  furosemide 40 mg oral tablet: 1 tab(s) orally once a day (08 Oct 2023 14:31)  magnesium oxide 500 mg oral tablet: 1 tab(s) orally once a day (08 Oct 2023 14:37)  metoprolol succinate 100 mg oral tablet, extended release: 1 tab(s) orally once a day (08 Oct 2023 14:31)  oxyBUTYnin 15 mg/24 hr oral tablet, extended release: 1 tab(s) orally once a day (08 Oct 2023 14:37)  potassium chloride 20 mEq oral tablet, extended release: 1 tab(s) orally once a day (08 Oct 2023 14:31)  pravastatin 20 mg oral tablet: 1 tab(s) orally once a day (08 Oct 2023 14:31)  tamsulosin 0.4 mg oral capsule: 1 cap(s) orally once a day (08 Oct 2023 14:31)  Vitamin D3 1250 mcg (50,000 intl units) oral capsule: 1 cap(s) orally once a week (08 Oct 2023 14:37)    MEDICATIONS  (STANDING):  apixaban 5 milliGRAM(s) Oral every 12 hours  azithromycin  IVPB 500 milliGRAM(s) IV Intermittent every 24 hours  azithromycin  IVPB      cefepime  Injectable. 2000 milliGRAM(s) IV Push every 12 hours  chlorhexidine 0.12% Liquid 15 milliLiter(s) Oral Mucosa every 12 hours  cisatracurium Infusion 3 MICROgram(s)/kG/Min (18.8 mL/Hr) IV Continuous <Continuous>  esmolol  Infusion 50 MICROgram(s)/kG/Min (31.3 mL/Hr) IV Continuous <Continuous>  folic acid Injectable 1 milliGRAM(s) IV Push daily  levalbuterol Inhalation 1.25 milliGRAM(s) Inhalation every 4 hours  metoprolol tartrate 25 milliGRAM(s) Oral two times a day  pantoprazole   Suspension 40 milliGRAM(s) Oral daily  phenylephrine    Infusion 1 MICROgram(s)/kG/Min (39.2 mL/Hr) IV Continuous <Continuous>  propofol Infusion 20 MICROgram(s)/kG/Min (12.5 mL/Hr) IV Continuous <Continuous>  rocuronium Injectable 40 milliGRAM(s) IV Push once  tamsulosin 0.4 milliGRAM(s) Oral at bedtime  thiamine Injectable 100 milliGRAM(s) IV Push daily    MEDICATIONS  (PRN):  acetaminophen     Tablet .. 650 milliGRAM(s) Oral every 6 hours PRN Temp greater or equal to 38C (100.4F), Mild Pain (1 - 3)  acetaminophen   Oral Liquid .. 650 milliGRAM(s) Oral every 6 hours PRN Temp greater or equal to 38.5C (101.3F)  aluminum hydroxide/magnesium hydroxide/simethicone Suspension 30 milliLiter(s) Oral every 4 hours PRN Dyspepsia  LORazepam   Injectable 2 milliGRAM(s) IV Push every 4 hours PRN Agitation  melatonin 3 milliGRAM(s) Oral at bedtime PRN Insomnia  ondansetron Injectable 4 milliGRAM(s) IV Push every 8 hours PRN Nausea and/or Vomiting      Allergies    No Known Allergies    Intolerances        Vital Signs Last 24 Hrs  T(C): 36.4 (10 Oct 2023 12:40), Max: 38.7 (09 Oct 2023 17:30)  T(F): 97.5 (10 Oct 2023 12:40), Max: 101.6 (09 Oct 2023 17:30)  HR: 94 (10 Oct 2023 13:00) (89 - 150)  BP: 77/53 (10 Oct 2023 13:00) (77/53 - 163/74)  BP(mean): 60 (10 Oct 2023 13:00) (60 - 102)  RR: 24 (10 Oct 2023 13:00) (16 - 34)  SpO2: 84% (10 Oct 2023 12:32) (84% - 98%)    Parameters below as of 10 Oct 2023 07:00  Patient On (Oxygen Delivery Method): conventional ventilator    O2 Concentration (%): 80  Mode: AC/ CMV (Assist Control/ Continuous Mandatory Ventilation)  RR (machine): 14  TV (machine): 440  FiO2: 100  PEEP: 8  ITime: 1  PIP: 20      PHYSICAL EXAMINATION:    NECK:  Supple. No lymphadenopathy. Jugular venous pressure not elevated. Carotids equal.   HEART:   The cardiac impulse has a normal quality. Reg., Nl S1 and S2.  There are no murmurs, rubs or gallops noted  CHEST:  Chest crackles to auscultation. Normal respiratory effort.  ABDOMEN:  Soft and nontender.   EXTREMITIES:  There is no edema.       LABS:                        13.2   9.71  )-----------( 169      ( 10 Oct 2023 06:23 )             40.9     10-10    132<L>  |  103  |  51<H>  ----------------------------<  161<H>  3.7   |  21<L>  |  1.46<H>    Ca    9.9      10 Oct 2023 06:23  Phos  3.3     10-09  Mg     3.2     10-09    TPro  6.7  /  Alb  2.3<L>  /  TBili  1.0  /  DBili  x   /  AST  100<H>  /  ALT  32  /  AlkPhos  43  10-09    PT/INR - ( 09 Oct 2023 06:36 )   PT: 14.6 sec;   INR: 1.30 ratio         PTT - ( 09 Oct 2023 06:36 )  PTT:34.0 sec  Urinalysis Basic - ( 10 Oct 2023 06:23 )    Color: x / Appearance: x / SG: x / pH: x  Gluc: 161 mg/dL / Ketone: x  / Bili: x / Urobili: x   Blood: x / Protein: x / Nitrite: x   Leuk Esterase: x / RBC: x / WBC x   Sq Epi: x / Non Sq Epi: x / Bacteria: x

## 2023-10-10 NOTE — PROGRESS NOTE ADULT - ASSESSMENT
Acute respiratory failure with hypoxia.  , dense infilrate      enterovirus, urine legionella positive  Rapid afib,    CRISPIN  hyponatremia    1. neuro - sedated on vent, continue propofol  2. respiratory still hypoxia, dense infiltrate, legionella pneumonia, on cefepime, zithromax Day 2 Continue         inc peep  8, on 80% oxygen,f/o cxr tomorrow am  3. cv - rate control better, continue lopressor as bp tolerated, on eliquis for ac  4. abdomen - soft, start tube feeds, PPI  5. ID - Zosyn, zithromax, continue, check sputum culture  6. Renal failure - creatinine 1.5 monitor hydration

## 2023-10-10 NOTE — DIETITIAN INITIAL EVALUATION ADULT - PROBLEM SELECTOR PLAN 4
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

## 2023-10-10 NOTE — CDI QUERY NOTE - NSCDIOTHERTXTBX_GEN_ALL_CORE_HH
Infectious disease has documented probable sepsis.     Can you please clarify if sepsis was ruled in?    - Sepsis ruled in POA  - Sepsis ruled out   - Other (please specify)     Supporting documentation:     SIRS Criteria on admission: Temp 102.1, , RR 24, with Legionella pneumonia     Infectious disease consult 10/9/2023   T(F): 98.8 (09 Oct 2023 04:10), Max: 102.1 (08 Oct 2023 12:10)  HR: 105 (09 Oct 2023 06:47) (62 - 140)  RR: 30 (09 Oct 2023 02:35) (20 - 38)  Febrile syndrome. Probable sepsis. Acute respiratory failure. RUL pneumonia.     Critical care progress note 10/10/2023   Acute respiratory failure with hypoxia, dense infilrate enterovirus, urine legionella positive  Rapid afib, ID - Zosyn, zithromax, continue, check sputum culture

## 2023-10-10 NOTE — PROGRESS NOTE ADULT - ASSESSMENT
83 y/o male with respiratory fialure secondary to rhino virus    PROBLEMS:    Acute respiratory failure with hypoxia  Asthmatic bronchitis/Febrile syndrome-Probable sepsis-RLL pneumonia  Metabolic encephalopathy.   Chronic atrial fibrillation with RVR  BPH with urinary obstruction    PLAN:    pulmonary critical-paralysed with cistacranium  vent care  BC x 2, urine c/s  Iv cefepime 2 gm q12h and azithromycin 500 mg IV qd  IV solumedrol 40 mg q8h  Duoneb Q4HR  Eliquis 5 mg po q12h  Chronic Goode placement, changed in the ED  D/w staff  DVT PROPHYLASIX

## 2023-10-10 NOTE — DIETITIAN INITIAL EVALUATION ADULT - ENTERAL
1) Initiate Jevity 1.5 @ 20 cc/hr, increase by 10 cc/hr q6 hours until goal rate of 75 cc/hr is met (total volume = 1500 mL) with 3 packets of Prosource TF. Will provide ~ 2370 kcal, 129 g protein, and 1140 mL free water. 2) Consider free water flush of 20 cc/hr (provides an additional 400 mL); adjust prn to maintain hydration as per MD.

## 2023-10-10 NOTE — DIETITIAN INITIAL EVALUATION ADULT - PERTINENT LABORATORY DATA
10-10    132<L>  |  103  |  51<H>  ----------------------------<  161<H>  3.7   |  21<L>  |  1.46<H>    Ca    9.9      10 Oct 2023 06:23  Phos  3.3     10-09  Mg     3.2     10-09    TPro  6.7  /  Alb  2.3<L>  /  TBili  1.0  /  DBili  x   /  AST  100<H>  /  ALT  32  /  AlkPhos  43  10-09  A1C with Estimated Average Glucose Result: 5.9 % (10-09-23 @ 06:36)

## 2023-10-10 NOTE — PROGRESS NOTE ADULT - SUBJECTIVE AND OBJECTIVE BOX
Date of service: 10-10-23 @ 11:33    Lying in bed in NAD  Events noted  Intubated on ventilatory support  Febrile to 101.6F  Lethargic     ROS: unobtainable    MEDICATIONS  (STANDING):  apixaban 5 milliGRAM(s) Oral every 12 hours  azithromycin  IVPB      azithromycin  IVPB 500 milliGRAM(s) IV Intermittent every 24 hours  cefepime  Injectable. 2000 milliGRAM(s) IV Push every 12 hours  chlorhexidine 0.12% Liquid 15 milliLiter(s) Oral Mucosa every 12 hours  folic acid Injectable 1 milliGRAM(s) IV Push daily  levalbuterol Inhalation 1.25 milliGRAM(s) Inhalation every 4 hours  metoprolol tartrate 25 milliGRAM(s) Oral two times a day  pantoprazole   Suspension 40 milliGRAM(s) Oral daily  propofol Infusion 20 MICROgram(s)/kG/Min (12.5 mL/Hr) IV Continuous <Continuous>  tamsulosin 0.4 milliGRAM(s) Oral at bedtime  thiamine Injectable 100 milliGRAM(s) IV Push daily    Vital Signs Last 24 Hrs  T(C): 36.4 (10 Oct 2023 05:29), Max: 38.7 (09 Oct 2023 17:30)  T(F): 97.6 (10 Oct 2023 05:29), Max: 101.6 (09 Oct 2023 17:30)  HR: 139 (10 Oct 2023 09:00) (89 - 139)  BP: 163/74 (10 Oct 2023 09:00) (78/52 - 163/74)  BP(mean): 97 (10 Oct 2023 09:00) (61 - 104)  RR: 30 (10 Oct 2023 09:00) (12 - 32)  SpO2: 91% (10 Oct 2023 09:00) (91% - 98%)    Parameters below as of 10 Oct 2023 07:00  Patient On (Oxygen Delivery Method): conventional ventilator    O2 Concentration (%): 80  Mode: AC/ CMV (Assist Control/ Continuous Mandatory Ventilation), RR (machine): 14, TV (machine): 440, FiO2: 90, PEEP: 8, ITime: 1, PIP: 25   Physical exam:    Constitutional:  No acute distress  HEENT: NC/AT, EOMI, PERRLA, conjunctivae clear; ears and nose atraumatic  Neck: supple; thyroid not palpable  Back: no tenderness  Respiratory: respiratory effort normal; crackles at bases  Cardiovascular: S1S2 regular, no murmurs  Abdomen: soft, not tender, not distended, positive BS  Genitourinary: no suprapubic tenderness  Lymphatic: no LN palpable  Musculoskeletal: no muscle tenderness, no joint swelling or tenderness  Extremities: no pedal edema  Neurological/ Psychiatric: Alert, moving all extremities  Skin: no rashes; no palpable lesions    Labs: all available labs reviewed                        12.8   5.90  )-----------( 159      ( 09 Oct 2023 06:36 )             38.6     10-09    128<L>  |  102  |  35<H>  ----------------------------<  123<H>  4.5   |  20<L>  |  1.08    Ca    9.3      09 Oct 2023 06:36  Phos  3.3     10-09  Mg     3.2     10-09    TPro  6.7  /  Alb  2.3<L>  /  TBili  1.0  /  DBili  x   /  AST  100<H>  /  ALT  32  /  AlkPhos  43  10-09     LIVER FUNCTIONS - ( 09 Oct 2023 06:36 )  Alb: 2.3 g/dL / Pro: 6.7 gm/dL / ALK PHOS: 43 U/L / ALT: 32 U/L / AST: 100 U/L / GGT: x           Urinalysis (10-08 @ 12:12)  Protein, Urine: 100 mg/dL  Urine Appearance: Clear  Urine Microscopic-Add On (NC) (10-08 @ 12:12)  Red Blood Cell - Urine: 57 /HPF  White Blood Cell - Urine: 5 /HPF    Rhinovirus (10-08 @ 12:12)  Detected    Culture - Sputum (collected 09 Oct 2023 13:00)  Source: ET Tube ET Tube  Gram Stain (09 Oct 2023 20:14):    No Squamous epithelial cells seen per low power field    Few polymorphonuclear leukocytes seen per low power field    No organisms seen per oil power field    Culture - Sputum (collected 09 Oct 2023 10:35)  Source: .Sputum Sputum  Gram Stain (09 Oct 2023 17:46):    Few polymorphonuclear leukocytes per low power field    Few Squamous epithelial cells per low power field    Rare Gram Negative Rods per oil power field    Few Gram positive cocci in pairs per oil power field    Culture - Blood (collected 08 Oct 2023 12:12)  Source: .Blood Blood-Peripheral  Preliminary Report (09 Oct 2023 18:01):    No growth at 24 hours    Culture - Urine (collected 08 Oct 2023 12:12)  Source: Clean Catch Clean Catch (Midstream)  Preliminary Report (10 Oct 2023 10:19):    >100,000 CFU/ml Enterococcus species    Culture - Blood (collected 08 Oct 2023 12:12)  Source: .Blood Blood-Peripheral  Preliminary Report (09 Oct 2023 18:01):    No growth at 24 hours    Radiology: all available radiological tests reviewed    < from: US Duplex Venous Lower Ext Complete, Bilateral (10.09.23 @ 08:13) >  No evidence of deep venous thrombosis in either lower extremity.   Superficial right-sided venous thrombosis involving the lower greater saphenous vein.  < end of copied text >    < from: CT Angio Chest PE Protocol w/ IV Cont (10.08.23 @ 23:51) >  Suboptimal evaluation of the peripheral pulmonary arteries. No central pulmonary embolus.  Consolidative opacity at the right lower lobe, suspicious for pneumonia.   Small dependent left lower lobe opacity, which may represent atelectasis   vs pneumonia. Recommend clinical correlation and follow-up to resolution.  < end of copied text >    Advanced directives addressed: full resuscitation

## 2023-10-10 NOTE — PROGRESS NOTE ADULT - ASSESSMENT
81 y/o male with h/o obesity, OA, atrial fibrillation (Eliquis), (R) sided visual loss, HLD, prior cellulitis, BPH was admitted on 10/8 for increased coughing, congestion, SOB, fever and hallucination x 3 days. According to the daughter the patient has been coughing wheezing and being congested. On the day of admission, the patient started hallucinating and was subsequently brought in the hospital. In the ER, he was noted deconditioned and frail, continues coughing. He received zosyn and azithromycin.     1. Febrile syndrome. Probable sepsis. Acute respiratory failure. RUL pneumonia. UTI with ENFA. Metabolic encephalopathy.  -BC x 2, urine c/s noted  -on cefepime 2 gm IV q12h and azithromycin 500 mg IV qd # 2  -tolerating abx well so far; no side effects noted  -change cefepime to zosyn 3.375 gm IV q8h  -reason for abx use and side effects reviewed with patient; monitor BMP   -f/u cultures  -continue abx coverage   -respiratory care  -monitor temps  -f/u CBC  -supportive care  2. Other issues:   -care per medicine

## 2023-10-11 LAB
-  AMPICILLIN: SIGNIFICANT CHANGE UP
-  CIPROFLOXACIN: SIGNIFICANT CHANGE UP
-  LEVOFLOXACIN: SIGNIFICANT CHANGE UP
-  NITROFURANTOIN: SIGNIFICANT CHANGE UP
-  TETRACYCLINE: SIGNIFICANT CHANGE UP
-  VANCOMYCIN: SIGNIFICANT CHANGE UP
CULTURE RESULTS: SIGNIFICANT CHANGE UP
METHOD TYPE: SIGNIFICANT CHANGE UP
ORGANISM # SPEC MICROSCOPIC CNT: SIGNIFICANT CHANGE UP
ORGANISM # SPEC MICROSCOPIC CNT: SIGNIFICANT CHANGE UP
SPECIMEN SOURCE: SIGNIFICANT CHANGE UP

## 2023-10-11 NOTE — CHART NOTE - NSCHARTNOTEFT_GEN_A_CORE
CLARIFICATION    Patient was admit with fever, tachycardia, sepsis,   secondary to Legionella Pneumonia

## 2023-10-17 DIAGNOSIS — N17.9 ACUTE KIDNEY FAILURE, UNSPECIFIED: ICD-10-CM

## 2023-10-17 DIAGNOSIS — I95.9 HYPOTENSION, UNSPECIFIED: ICD-10-CM

## 2023-10-17 DIAGNOSIS — N39.0 URINARY TRACT INFECTION, SITE NOT SPECIFIED: ICD-10-CM

## 2023-10-17 DIAGNOSIS — E78.00 PURE HYPERCHOLESTEROLEMIA, UNSPECIFIED: ICD-10-CM

## 2023-10-17 DIAGNOSIS — N13.8 OTHER OBSTRUCTIVE AND REFLUX UROPATHY: ICD-10-CM

## 2023-10-17 DIAGNOSIS — M16.11 UNILATERAL PRIMARY OSTEOARTHRITIS, RIGHT HIP: ICD-10-CM

## 2023-10-17 DIAGNOSIS — Z11.52 ENCOUNTER FOR SCREENING FOR COVID-19: ICD-10-CM

## 2023-10-17 DIAGNOSIS — R00.1 BRADYCARDIA, UNSPECIFIED: ICD-10-CM

## 2023-10-17 DIAGNOSIS — B97.89 OTHER VIRAL AGENTS AS THE CAUSE OF DISEASES CLASSIFIED ELSEWHERE: ICD-10-CM

## 2023-10-17 DIAGNOSIS — I10 ESSENTIAL (PRIMARY) HYPERTENSION: ICD-10-CM

## 2023-10-17 DIAGNOSIS — G93.41 METABOLIC ENCEPHALOPATHY: ICD-10-CM

## 2023-10-17 DIAGNOSIS — G47.00 INSOMNIA, UNSPECIFIED: ICD-10-CM

## 2023-10-17 DIAGNOSIS — J96.01 ACUTE RESPIRATORY FAILURE WITH HYPOXIA: ICD-10-CM

## 2023-10-17 DIAGNOSIS — R44.3 HALLUCINATIONS, UNSPECIFIED: ICD-10-CM

## 2023-10-17 DIAGNOSIS — E66.9 OBESITY, UNSPECIFIED: ICD-10-CM

## 2023-10-17 DIAGNOSIS — J45.909 UNSPECIFIED ASTHMA, UNCOMPLICATED: ICD-10-CM

## 2023-10-17 DIAGNOSIS — I48.20 CHRONIC ATRIAL FIBRILLATION, UNSPECIFIED: ICD-10-CM

## 2023-10-17 DIAGNOSIS — I27.20 PULMONARY HYPERTENSION, UNSPECIFIED: ICD-10-CM

## 2023-10-17 DIAGNOSIS — Z66 DO NOT RESUSCITATE: ICD-10-CM

## 2023-10-17 DIAGNOSIS — A48.1 LEGIONNAIRES' DISEASE: ICD-10-CM

## 2023-10-17 DIAGNOSIS — E22.2 SYNDROME OF INAPPROPRIATE SECRETION OF ANTIDIURETIC HORMONE: ICD-10-CM

## 2023-10-17 DIAGNOSIS — Z79.01 LONG TERM (CURRENT) USE OF ANTICOAGULANTS: ICD-10-CM

## 2023-10-17 DIAGNOSIS — F10.10 ALCOHOL ABUSE, UNCOMPLICATED: ICD-10-CM

## 2023-10-17 DIAGNOSIS — N40.1 BENIGN PROSTATIC HYPERPLASIA WITH LOWER URINARY TRACT SYMPTOMS: ICD-10-CM

## 2023-10-17 DIAGNOSIS — A41.89 OTHER SPECIFIED SEPSIS: ICD-10-CM

## 2023-10-28 NOTE — DISCHARGE NOTE FOR THE EXPIRED PATIENT - ATTENDING PHYSICIAN NOTIFIED OF DEATH/AUTOPSY/CONSENT STATUS (NAME OF ATTENDING)
feigenabum PAST MEDICAL HISTORY:  Breast cancer     CAD (coronary artery disease)     COPD, severe

## 2024-01-10 NOTE — ED STATDOCS - [facility] ED LOCATION
Biopsy Photograph Reviewed: Yes Size Of Lesion In Cm: 3.5 X Size Of Lesion In Cm (Optional): 2 Size Of Margin In Cm: 0.5 abdomen Anesthesia Volume In Cc: 6 Was An Eye Clamp Used?: No Eye Clamp Note Details: An eye clamp was used during the procedure. Excision Method: Fusiform Saucerization Depth: dermis and superficial adipose tissue Repair Type: Complex Intermediate / Complex Repair - Final Wound Length In Cm: 3.6 Suturegard Retention Suture: 2-0 Nylon Retention Suture Bite Size: 3 mm Length To Time In Minutes Device Was In Place: 10 Number Of Hemigard Strips Per Side: 1 Width Of Defect Perpendicular To Closure In Cm (Required): 3 Undermining Type: Entire Wound Debridement Text: The wound edges were debrided prior to proceeding with the closure to facilitate wound healing. Helical Rim Text: The closure involved the helical rim. Vermilion Border Text: The closure involved the vermilion border. Nostril Rim Text: The closure involved the nostril rim. Retention Suture Text: Retention sutures were placed to support the closure and prevent dehiscence. Primary Defect Length (In Cm): 0 Suture Removal: 14 days Lab: [id number] Epidermal Closure Graft Donor Site (Optional): simple interrupted Billing Type: Third-Party Bill Excision Depth: adipose tissue Scalpel Size: 15 blade Anesthesia Type: 1% lidocaine with epinephrine Hemostasis: Pressure Estimated Blood Loss (Cc): minimal Detail Level: Detailed Deep Sutures: 4-0 PGA Epidermal Sutures: 4-0 Nylon Epidermal Closure: running Wound Care: Petrolatum Dressing: dry sterile dressing Suturegard Intro: Intraoperative tissue expansion was performed, utilizing the SUTUREGARD device, in order to reduce wound tension. Suturegard Body: The suture ends were repeatedly re-tightened and re-clamped to achieve the desired tissue expansion. Hemigard Intro: Due to skin fragility and wound tension, it was decided to use HEMIGARD adhesive retention suture devices to permit a linear closure. The skin was cleaned and dried for a 6cm distance away from the wound. Excessive hair, if present, was removed to allow for adhesion. Hemigard Postcare Instructions: The HEMIGARD strips are to remain completely dry for at least 5-7 days. Positioning (Leave Blank If You Do Not Want): The patient was placed in a comfortable position exposing the surgical site. Pre-Excision Curettage Text (Leave Blank If You Do Not Want): Prior to drawing the surgical margin the visible lesion was removed with electrodesiccation and curettage to clearly define the lesion size. Complex Repair Preamble Text (Leave Blank If You Do Not Want): Extensive wide undermining was performed. Intermediate Repair Preamble Text (Leave Blank If You Do Not Want): Undermining was performed with blunt dissection. Curvilinear Excision Additional Text (Leave Blank If You Do Not Want): The margin was drawn around the clinically apparent lesion.  A curvilinear shape was then drawn on the skin incorporating the lesion and margins.  Incisions were then made along these lines to the appropriate tissue plane and the lesion was extirpated. Fusiform Excision Additional Text (Leave Blank If You Do Not Want): The margin was drawn around the clinically apparent lesion.  A fusiform shape was then drawn on the skin incorporating the lesion and margins.  Incisions were then made along these lines to the appropriate tissue plane and the lesion was extirpated. Elliptical Excision Additional Text (Leave Blank If You Do Not Want): The margin was drawn around the clinically apparent lesion.  An elliptical shape was then drawn on the skin incorporating the lesion and margins.  Incisions were then made along these lines to the appropriate tissue plane and the lesion was extirpated. Saucerization Excision Additional Text (Leave Blank If You Do Not Want): The margin was drawn around the clinically apparent lesion.  Incisions were then made along these lines, in a tangential fashion, to the appropriate tissue plane and the lesion was extirpated. Slit Excision Additional Text (Leave Blank If You Do Not Want): A linear line was drawn on the skin overlying the lesion. An incision was made slowly until the lesion was visualized.  Once visualized, the lesion was removed with blunt dissection. Excisional Biopsy Additional Text (Leave Blank If You Do Not Want): The margin was drawn around the clinically apparent lesion. An elliptical shape was then drawn on the skin incorporating the lesion and margins.  Incisions were then made along these lines to the appropriate tissue plane and the lesion was extirpated. Perilesional Excision Additional Text (Leave Blank If You Do Not Want): The margin was drawn around the clinically apparent lesion. Incisions were then made along these lines to the appropriate tissue plane and the lesion was extirpated. Repair Performed By Another Provider Text (Leave Blank If You Do Not Want): After the tissue was excised the defect was repaired by another provider. No Repair - Repaired With Adjacent Surgical Defect Text (Leave Blank If You Do Not Want): After the excision the defect was repaired concurrently with another surgical defect which was in close approximation. Adjacent Tissue Transfer Text: The defect edges were debeveled with a #15 scalpel blade.  Given the location of the defect and the proximity to free margins an adjacent tissue transfer was deemed most appropriate.  Using a sterile surgical marker, an appropriate flap was drawn incorporating the defect and placing the expected incisions within the relaxed skin tension lines where possible.    The area thus outlined was incised deep to adipose tissue with a #15 scalpel blade.  The skin margins were undermined to an appropriate distance in all directions utilizing iris scissors. Advancement Flap (Single) Text: The defect edges were debeveled with a #15 scalpel blade.  Given the location of the defect and the proximity to free margins a single advancement flap was deemed most appropriate.  Using a sterile surgical marker, an appropriate advancement flap was drawn incorporating the defect and placing the expected incisions within the relaxed skin tension lines where possible.    The area thus outlined was incised deep to adipose tissue with a #15 scalpel blade.  The skin margins were undermined to an appropriate distance in all directions utilizing iris scissors. Advancement Flap (Double) Text: The defect edges were debeveled with a #15 scalpel blade.  Given the location of the defect and the proximity to free margins a double advancement flap was deemed most appropriate.  Using a sterile surgical marker, the appropriate advancement flaps were drawn incorporating the defect and placing the expected incisions within the relaxed skin tension lines where possible.    The area thus outlined was incised deep to adipose tissue with a #15 scalpel blade.  The skin margins were undermined to an appropriate distance in all directions utilizing iris scissors. Burow's Advancement Flap Text: The defect edges were debeveled with a #15 scalpel blade.  Given the location of the defect and the proximity to free margins a Burow's advancement flap was deemed most appropriate.  Using a sterile surgical marker, the appropriate advancement flap was drawn incorporating the defect and placing the expected incisions within the relaxed skin tension lines where possible.    The area thus outlined was incised deep to adipose tissue with a #15 scalpel blade.  The skin margins were undermined to an appropriate distance in all directions utilizing iris scissors. Chonodrocutaneous Helical Advancement Flap Text: The defect edges were debeveled with a #15 scalpel blade.  Given the location of the defect and the proximity to free margins a chondrocutaneous helical advancement flap was deemed most appropriate.  Using a sterile surgical marker, the appropriate advancement flap was drawn incorporating the defect and placing the expected incisions within the relaxed skin tension lines where possible.    The area thus outlined was incised deep to adipose tissue with a #15 scalpel blade.  The skin margins were undermined to an appropriate distance in all directions utilizing iris scissors. Crescentic Advancement Flap Text: The defect edges were debeveled with a #15 scalpel blade.  Given the location of the defect and the proximity to free margins a crescentic advancement flap was deemed most appropriate.  Using a sterile surgical marker, the appropriate advancement flap was drawn incorporating the defect and placing the expected incisions within the relaxed skin tension lines where possible.    The area thus outlined was incised deep to adipose tissue with a #15 scalpel blade.  The skin margins were undermined to an appropriate distance in all directions utilizing iris scissors. A-T Advancement Flap Text: The defect edges were debeveled with a #15 scalpel blade.  Given the location of the defect, shape of the defect and the proximity to free margins an A-T advancement flap was deemed most appropriate.  Using a sterile surgical marker, an appropriate advancement flap was drawn incorporating the defect and placing the expected incisions within the relaxed skin tension lines where possible.    The area thus outlined was incised deep to adipose tissue with a #15 scalpel blade.  The skin margins were undermined to an appropriate distance in all directions utilizing iris scissors. O-T Advancement Flap Text: The defect edges were debeveled with a #15 scalpel blade.  Given the location of the defect, shape of the defect and the proximity to free margins an O-T advancement flap was deemed most appropriate.  Using a sterile surgical marker, an appropriate advancement flap was drawn incorporating the defect and placing the expected incisions within the relaxed skin tension lines where possible.    The area thus outlined was incised deep to adipose tissue with a #15 scalpel blade.  The skin margins were undermined to an appropriate distance in all directions utilizing iris scissors. O-L Flap Text: The defect edges were debeveled with a #15 scalpel blade.  Given the location of the defect, shape of the defect and the proximity to free margins an O-L flap was deemed most appropriate.  Using a sterile surgical marker, an appropriate advancement flap was drawn incorporating the defect and placing the expected incisions within the relaxed skin tension lines where possible.    The area thus outlined was incised deep to adipose tissue with a #15 scalpel blade.  The skin margins were undermined to an appropriate distance in all directions utilizing iris scissors. O-Z Flap Text: The defect edges were debeveled with a #15 scalpel blade.  Given the location of the defect, shape of the defect and the proximity to free margins an O-Z flap was deemed most appropriate.  Using a sterile surgical marker, an appropriate transposition flap was drawn incorporating the defect and placing the expected incisions within the relaxed skin tension lines where possible. The area thus outlined was incised deep to adipose tissue with a #15 scalpel blade.  The skin margins were undermined to an appropriate distance in all directions utilizing iris scissors. Double O-Z Flap Text: The defect edges were debeveled with a #15 scalpel blade.  Given the location of the defect, shape of the defect and the proximity to free margins a Double O-Z flap was deemed most appropriate.  Using a sterile surgical marker, an appropriate transposition flap was drawn incorporating the defect and placing the expected incisions within the relaxed skin tension lines where possible. The area thus outlined was incised deep to adipose tissue with a #15 scalpel blade.  The skin margins were undermined to an appropriate distance in all directions utilizing iris scissors. V-Y Flap Text: The defect edges were debeveled with a #15 scalpel blade.  Given the location of the defect, shape of the defect and the proximity to free margins a V-Y flap was deemed most appropriate.  Using a sterile surgical marker, an appropriate advancement flap was drawn incorporating the defect and placing the expected incisions within the relaxed skin tension lines where possible.    The area thus outlined was incised deep to adipose tissue with a #15 scalpel blade.  The skin margins were undermined to an appropriate distance in all directions utilizing iris scissors. Advancement-Rotation Flap Text: The defect edges were debeveled with a #15 scalpel blade.  Given the location of the defect, shape of the defect and the proximity to free margins an advancement-rotation flap was deemed most appropriate.  Using a sterile surgical marker, an appropriate flap was drawn incorporating the defect and placing the expected incisions within the relaxed skin tension lines where possible. The area thus outlined was incised deep to adipose tissue with a #15 scalpel blade.  The skin margins were undermined to an appropriate distance in all directions utilizing iris scissors. Mercedes Flap Text: The defect edges were debeveled with a #15 scalpel blade.  Given the location of the defect, shape of the defect and the proximity to free margins a Mercedes flap was deemed most appropriate.  Using a sterile surgical marker, an appropriate advancement flap was drawn incorporating the defect and placing the expected incisions within the relaxed skin tension lines where possible. The area thus outlined was incised deep to adipose tissue with a #15 scalpel blade.  The skin margins were undermined to an appropriate distance in all directions utilizing iris scissors. Modified Advancement Flap Text: The defect edges were debeveled with a #15 scalpel blade.  Given the location of the defect, shape of the defect and the proximity to free margins a modified advancement flap was deemed most appropriate.  Using a sterile surgical marker, an appropriate advancement flap was drawn incorporating the defect and placing the expected incisions within the relaxed skin tension lines where possible.    The area thus outlined was incised deep to adipose tissue with a #15 scalpel blade.  The skin margins were undermined to an appropriate distance in all directions utilizing iris scissors. Mucosal Advancement Flap Text: Given the location of the defect, shape of the defect and the proximity to free margins a mucosal advancement flap was deemed most appropriate. Incisions were made with a 15 blade scalpel in the appropriate fashion along the cutaneous vermilion border and the mucosal lip. The remaining actinically damaged mucosal tissue was excised.  The mucosal advancement flap was then elevated to the gingival sulcus with care taken to preserve the neurovascular structures and advanced into the primary defect. Care was taken to ensure that precise realignment of the vermilion border was achieved. Peng Advancement Flap Text: The defect edges were debeveled with a #15 scalpel blade.  Given the location of the defect, shape of the defect and the proximity to free margins a Peng advancement flap was deemed most appropriate.  Using a sterile surgical marker, an appropriate advancement flap was drawn incorporating the defect and placing the expected incisions within the relaxed skin tension lines where possible. The area thus outlined was incised deep to adipose tissue with a #15 scalpel blade.  The skin margins were undermined to an appropriate distance in all directions utilizing iris scissors. Hatchet Flap Text: The defect edges were debeveled with a #15 scalpel blade.  Given the location of the defect, shape of the defect and the proximity to free margins a hatchet flap was deemed most appropriate.  Using a sterile surgical marker, an appropriate hatchet flap was drawn incorporating the defect and placing the expected incisions within the relaxed skin tension lines where possible.    The area thus outlined was incised deep to adipose tissue with a #15 scalpel blade.  The skin margins were undermined to an appropriate distance in all directions utilizing iris scissors. Rotation Flap Text: The defect edges were debeveled with a #15 scalpel blade.  Given the location of the defect, shape of the defect and the proximity to free margins a rotation flap was deemed most appropriate.  Using a sterile surgical marker, an appropriate rotation flap was drawn incorporating the defect and placing the expected incisions within the relaxed skin tension lines where possible.    The area thus outlined was incised deep to adipose tissue with a #15 scalpel blade.  The skin margins were undermined to an appropriate distance in all directions utilizing iris scissors. Bilateral Rotation Flap Text: The defect edges were debeveled with a #15 scalpel blade. Given the location of the defect, shape of the defect and the proximity to free margins a bilateral rotation flap was deemed most appropriate. Using a sterile surgical marker, an appropriate rotation flap was drawn incorporating the defect and placing the expected incisions within the relaxed skin tension lines where possible. The area thus outlined was incised deep to adipose tissue with a #15 scalpel blade. The skin margins were undermined to an appropriate distance in all directions utilizing iris scissors. Following this, the designed flap was carried over into the primary defect and sutured into place. Spiral Flap Text: The defect edges were debeveled with a #15 scalpel blade.  Given the location of the defect, shape of the defect and the proximity to free margins a spiral flap was deemed most appropriate.  Using a sterile surgical marker, an appropriate rotation flap was drawn incorporating the defect and placing the expected incisions within the relaxed skin tension lines where possible. The area thus outlined was incised deep to adipose tissue with a #15 scalpel blade.  The skin margins were undermined to an appropriate distance in all directions utilizing iris scissors. Staged Advancement Flap Text: The defect edges were debeveled with a #15 scalpel blade.  Given the location of the defect, shape of the defect and the proximity to free margins a staged advancement flap was deemed most appropriate.  Using a sterile surgical marker, an appropriate advancement flap was drawn incorporating the defect and placing the expected incisions within the relaxed skin tension lines where possible. The area thus outlined was incised deep to adipose tissue with a #15 scalpel blade.  The skin margins were undermined to an appropriate distance in all directions utilizing iris scissors. Star Wedge Flap Text: The defect edges were debeveled with a #15 scalpel blade.  Given the location of the defect, shape of the defect and the proximity to free margins a star wedge flap was deemed most appropriate.  Using a sterile surgical marker, an appropriate rotation flap was drawn incorporating the defect and placing the expected incisions within the relaxed skin tension lines where possible. The area thus outlined was incised deep to adipose tissue with a #15 scalpel blade.  The skin margins were undermined to an appropriate distance in all directions utilizing iris scissors. Transposition Flap Text: The defect edges were debeveled with a #15 scalpel blade.  Given the location of the defect and the proximity to free margins a transposition flap was deemed most appropriate.  Using a sterile surgical marker, an appropriate transposition flap was drawn incorporating the defect.    The area thus outlined was incised deep to adipose tissue with a #15 scalpel blade.  The skin margins were undermined to an appropriate distance in all directions utilizing iris scissors. Muscle Hinge Flap Text: The defect edges were debeveled with a #15 scalpel blade.  Given the size, depth and location of the defect and the proximity to free margins a muscle hinge flap was deemed most appropriate.  Using a sterile surgical marker, an appropriate hinge flap was drawn incorporating the defect. The area thus outlined was incised with a #15 scalpel blade.  The skin margins were undermined to an appropriate distance in all directions utilizing iris scissors. Mustarde Flap Text: The defect edges were debeveled with a #15 scalpel blade.  Given the size, depth and location of the defect and the proximity to free margins a Mustarde flap was deemed most appropriate.  Using a sterile surgical marker, an appropriate flap was drawn incorporating the defect. The area thus outlined was incised with a #15 scalpel blade.  The skin margins were undermined to an appropriate distance in all directions utilizing iris scissors. Nasal Turnover Hinge Flap Text: The defect edges were debeveled with a #15 scalpel blade.  Given the size, depth, location of the defect and the defect being full thickness a nasal turnover hinge flap was deemed most appropriate.  Using a sterile surgical marker, an appropriate hinge flap was drawn incorporating the defect. The area thus outlined was incised with a #15 scalpel blade. The flap was designed to recreate the nasal mucosal lining and the alar rim. The skin margins were undermined to an appropriate distance in all directions utilizing iris scissors. Nasalis-Muscle-Based Myocutaneous Island Pedicle Flap Text: Using a #15 blade, an incision was made around the donor flap to the level of the nasalis muscle. Wide lateral undermining was then performed in both the subcutaneous plane above the nasalis muscle, and in a submuscular plane just above periosteum. This allowed the formation of a free nasalis muscle axial pedicle (based on the angular artery) which was still attached to the actual cutaneous flap, increasing its mobility and vascular viability. Hemostasis was obtained with pinpoint electrocoagulation. The flap was mobilized into position and the pivotal anchor points positioned and stabilized with buried interrupted sutures. Subcutaneous and dermal tissues were closed in a multilayered fashion with sutures. Tissue redundancies were excised, and the epidermal edges were apposed without significant tension and sutured with sutures. Orbicularis Oris Muscle Flap Text: The defect edges were debeveled with a #15 scalpel blade.  Given that the defect affected the competency of the oral sphincter an orbicularis oris muscle flap was deemed most appropriate to restore this competency and normal muscle function.  Using a sterile surgical marker, an appropriate flap was drawn incorporating the defect. The area thus outlined was incised with a #15 scalpel blade. Melolabial Transposition Flap Text: The defect edges were debeveled with a #15 scalpel blade.  Given the location of the defect and the proximity to free margins a melolabial flap was deemed most appropriate.  Using a sterile surgical marker, an appropriate melolabial transposition flap was drawn incorporating the defect.    The area thus outlined was incised deep to adipose tissue with a #15 scalpel blade.  The skin margins were undermined to an appropriate distance in all directions utilizing iris scissors. Rhombic Flap Text: The defect edges were debeveled with a #15 scalpel blade.  Given the location of the defect and the proximity to free margins a rhombic flap was deemed most appropriate.  Using a sterile surgical marker, an appropriate rhombic flap was drawn incorporating the defect.    The area thus outlined was incised deep to adipose tissue with a #15 scalpel blade.  The skin margins were undermined to an appropriate distance in all directions utilizing iris scissors. Rhomboid Transposition Flap Text: The defect edges were debeveled with a #15 scalpel blade.  Given the location of the defect and the proximity to free margins a rhomboid transposition flap was deemed most appropriate.  Using a sterile surgical marker, an appropriate rhomboid flap was drawn incorporating the defect.    The area thus outlined was incised deep to adipose tissue with a #15 scalpel blade.  The skin margins were undermined to an appropriate distance in all directions utilizing iris scissors. Bi-Rhombic Flap Text: The defect edges were debeveled with a #15 scalpel blade.  Given the location of the defect and the proximity to free margins a bi-rhombic flap was deemed most appropriate.  Using a sterile surgical marker, an appropriate rhombic flap was drawn incorporating the defect. The area thus outlined was incised deep to adipose tissue with a #15 scalpel blade.  The skin margins were undermined to an appropriate distance in all directions utilizing iris scissors. Helical Rim Advancement Flap Text: The defect edges were debeveled with a #15 blade scalpel.  Given the location of the defect and the proximity to free margins (helical rim) a double helical rim advancement flap was deemed most appropriate.  Using a sterile surgical marker, the appropriate advancement flaps were drawn incorporating the defect and placing the expected incisions between the helical rim and antihelix where possible.  The area thus outlined was incised through and through with a #15 scalpel blade.  With a skin hook and iris scissors, the flaps were gently and sharply undermined and freed up. Bilateral Helical Rim Advancement Flap Text: The defect edges were debeveled with a #15 blade scalpel.  Given the location of the defect and the proximity to free margins (helical rim) a bilateral helical rim advancement flap was deemed most appropriate.  Using a sterile surgical marker, the appropriate advancement flaps were drawn incorporating the defect and placing the expected incisions between the helical rim and antihelix where possible.  The area thus outlined was incised through and through with a #15 scalpel blade.  With a skin hook and iris scissors, the flaps were gently and sharply undermined and freed up. Ear Star Wedge Flap Text: The defect edges were debeveled with a #15 blade scalpel.  Given the location of the defect and the proximity to free margins (helical rim) an ear star wedge flap was deemed most appropriate.  Using a sterile surgical marker, the appropriate flap was drawn incorporating the defect and placing the expected incisions between the helical rim and antihelix where possible.  The area thus outlined was incised through and through with a #15 scalpel blade. Banner Transposition Flap Text: The defect edges were debeveled with a #15 scalpel blade.  Given the location of the defect and the proximity to free margins a Banner transposition flap was deemed most appropriate.  Using a sterile surgical marker, an appropriate flap drawn around the defect. The area thus outlined was incised deep to adipose tissue with a #15 scalpel blade.  The skin margins were undermined to an appropriate distance in all directions utilizing iris scissors. Bilobed Flap Text: The defect edges were debeveled with a #15 scalpel blade.  Given the location of the defect and the proximity to free margins a bilobe flap was deemed most appropriate.  Using a sterile surgical marker, an appropriate bilobe flap drawn around the defect.    The area thus outlined was incised deep to adipose tissue with a #15 scalpel blade.  The skin margins were undermined to an appropriate distance in all directions utilizing iris scissors. Bilobed Transposition Flap Text: The defect edges were debeveled with a #15 scalpel blade.  Given the location of the defect and the proximity to free margins a bilobed transposition flap was deemed most appropriate.  Using a sterile surgical marker, an appropriate bilobe flap drawn around the defect.    The area thus outlined was incised deep to adipose tissue with a #15 scalpel blade.  The skin margins were undermined to an appropriate distance in all directions utilizing iris scissors. Trilobed Flap Text: The defect edges were debeveled with a #15 scalpel blade.  Given the location of the defect and the proximity to free margins a trilobed flap was deemed most appropriate.  Using a sterile surgical marker, an appropriate trilobed flap drawn around the defect.    The area thus outlined was incised deep to adipose tissue with a #15 scalpel blade.  The skin margins were undermined to an appropriate distance in all directions utilizing iris scissors. Dorsal Nasal Flap Text: The defect edges were debeveled with a #15 scalpel blade.  Given the location of the defect and the proximity to free margins a dorsal nasal flap was deemed most appropriate.  Using a sterile surgical marker, an appropriate dorsal nasal flap was drawn around the defect.    The area thus outlined was incised deep to adipose tissue with a #15 scalpel blade.  The skin margins were undermined to an appropriate distance in all directions utilizing iris scissors. Island Pedicle Flap Text: The defect edges were debeveled with a #15 scalpel blade.  Given the location of the defect, shape of the defect and the proximity to free margins an island pedicle advancement flap was deemed most appropriate.  Using a sterile surgical marker, an appropriate advancement flap was drawn incorporating the defect, outlining the appropriate donor tissue and placing the expected incisions within the relaxed skin tension lines where possible.    The area thus outlined was incised deep to adipose tissue with a #15 scalpel blade.  The skin margins were undermined to an appropriate distance in all directions around the primary defect and laterally outward around the island pedicle utilizing iris scissors.  There was minimal undermining beneath the pedicle flap. Island Pedicle Flap With Canthal Suspension Text: The defect edges were debeveled with a #15 scalpel blade.  Given the location of the defect, shape of the defect and the proximity to free margins an island pedicle advancement flap was deemed most appropriate.  Using a sterile surgical marker, an appropriate advancement flap was drawn incorporating the defect, outlining the appropriate donor tissue and placing the expected incisions within the relaxed skin tension lines where possible. The area thus outlined was incised deep to adipose tissue with a #15 scalpel blade.  The skin margins were undermined to an appropriate distance in all directions around the primary defect and laterally outward around the island pedicle utilizing iris scissors.  There was minimal undermining beneath the pedicle flap. A suspension suture was placed in the canthal tendon to prevent tension and prevent ectropion. Alar Island Pedicle Flap Text: The defect edges were debeveled with a #15 scalpel blade.  Given the location of the defect, shape of the defect and the proximity to the alar rim an island pedicle advancement flap was deemed most appropriate.  Using a sterile surgical marker, an appropriate advancement flap was drawn incorporating the defect, outlining the appropriate donor tissue and placing the expected incisions within the nasal ala running parallel to the alar rim. The area thus outlined was incised with a #15 scalpel blade.  The skin margins were undermined minimally to an appropriate distance in all directions around the primary defect and laterally outward around the island pedicle utilizing iris scissors.  There was minimal undermining beneath the pedicle flap. Double Island Pedicle Flap Text: The defect edges were debeveled with a #15 scalpel blade.  Given the location of the defect, shape of the defect and the proximity to free margins a double island pedicle advancement flap was deemed most appropriate.  Using a sterile surgical marker, an appropriate advancement flap was drawn incorporating the defect, outlining the appropriate donor tissue and placing the expected incisions within the relaxed skin tension lines where possible.    The area thus outlined was incised deep to adipose tissue with a #15 scalpel blade.  The skin margins were undermined to an appropriate distance in all directions around the primary defect and laterally outward around the island pedicle utilizing iris scissors.  There was minimal undermining beneath the pedicle flap. Island Pedicle Flap-Requiring Vessel Identification Text: The defect edges were debeveled with a #15 scalpel blade.  Given the location of the defect, shape of the defect and the proximity to free margins an island pedicle advancement flap was deemed most appropriate.  Using a sterile surgical marker, an appropriate advancement flap was drawn, based on the axial vessel mentioned above, incorporating the defect, outlining the appropriate donor tissue and placing the expected incisions within the relaxed skin tension lines where possible.    The area thus outlined was incised deep to adipose tissue with a #15 scalpel blade.  The skin margins were undermined to an appropriate distance in all directions around the primary defect and laterally outward around the island pedicle utilizing iris scissors.  There was minimal undermining beneath the pedicle flap. Keystone Flap Text: The defect edges were debeveled with a #15 scalpel blade.  Given the location of the defect, shape of the defect a keystone flap was deemed most appropriate.  Using a sterile surgical marker, an appropriate keystone flap was drawn incorporating the defect, outlining the appropriate donor tissue and placing the expected incisions within the relaxed skin tension lines where possible. The area thus outlined was incised deep to adipose tissue with a #15 scalpel blade.  The skin margins were undermined to an appropriate distance in all directions around the primary defect and laterally outward around the flap utilizing iris scissors. O-T Plasty Text: The defect edges were debeveled with a #15 scalpel blade.  Given the location of the defect, shape of the defect and the proximity to free margins an O-T plasty was deemed most appropriate.  Using a sterile surgical marker, an appropriate O-T plasty was drawn incorporating the defect and placing the expected incisions within the relaxed skin tension lines where possible.    The area thus outlined was incised deep to adipose tissue with a #15 scalpel blade.  The skin margins were undermined to an appropriate distance in all directions utilizing iris scissors. O-Z Plasty Text: The defect edges were debeveled with a #15 scalpel blade.  Given the location of the defect, shape of the defect and the proximity to free margins an O-Z plasty (double transposition flap) was deemed most appropriate.  Using a sterile surgical marker, the appropriate transposition flaps were drawn incorporating the defect and placing the expected incisions within the relaxed skin tension lines where possible.    The area thus outlined was incised deep to adipose tissue with a #15 scalpel blade.  The skin margins were undermined to an appropriate distance in all directions utilizing iris scissors.  Hemostasis was achieved with electrocautery.  The flaps were then transposed into place, one clockwise and the other counterclockwise, and anchored with interrupted buried subcutaneous sutures. Double O-Z Plasty Text: The defect edges were debeveled with a #15 scalpel blade.  Given the location of the defect, shape of the defect and the proximity to free margins a Double O-Z plasty (double transposition flap) was deemed most appropriate.  Using a sterile surgical marker, the appropriate transposition flaps were drawn incorporating the defect and placing the expected incisions within the relaxed skin tension lines where possible. The area thus outlined was incised deep to adipose tissue with a #15 scalpel blade.  The skin margins were undermined to an appropriate distance in all directions utilizing iris scissors.  Hemostasis was achieved with electrocautery.  The flaps were then transposed into place, one clockwise and the other counterclockwise, and anchored with interrupted buried subcutaneous sutures. V-Y Plasty Text: The defect edges were debeveled with a #15 scalpel blade.  Given the location of the defect, shape of the defect and the proximity to free margins an V-Y advancement flap was deemed most appropriate.  Using a sterile surgical marker, an appropriate advancement flap was drawn incorporating the defect and placing the expected incisions within the relaxed skin tension lines where possible.    The area thus outlined was incised deep to adipose tissue with a #15 scalpel blade.  The skin margins were undermined to an appropriate distance in all directions utilizing iris scissors. H Plasty Text: Given the location of the defect, shape of the defect and the proximity to free margins a H-plasty was deemed most appropriate for repair.  Using a sterile surgical marker, the appropriate advancement arms of the H-plasty were drawn incorporating the defect and placing the expected incisions within the relaxed skin tension lines where possible. The area thus outlined was incised deep to adipose tissue with a #15 scalpel blade. The skin margins were undermined to an appropriate distance in all directions utilizing iris scissors.  The opposing advancement arms were then advanced into place in opposite direction and anchored with interrupted buried subcutaneous sutures. W Plasty Text: The lesion was extirpated to the level of the fat with a #15 scalpel blade.  Given the location of the defect, shape of the defect and the proximity to free margins a W-plasty was deemed most appropriate for repair.  Using a sterile surgical marker, the appropriate transposition arms of the W-plasty were drawn incorporating the defect and placing the expected incisions within the relaxed skin tension lines where possible.    The area thus outlined was incised deep to adipose tissue with a #15 scalpel blade.  The skin margins were undermined to an appropriate distance in all directions utilizing iris scissors.  The opposing transposition arms were then transposed into place in opposite direction and anchored with interrupted buried subcutaneous sutures. Z Plasty Text: The lesion was extirpated to the level of the fat with a #15 scalpel blade.  Given the location of the defect, shape of the defect and the proximity to free margins a Z-plasty was deemed most appropriate for repair.  Using a sterile surgical marker, the appropriate transposition arms of the Z-plasty were drawn incorporating the defect and placing the expected incisions within the relaxed skin tension lines where possible.    The area thus outlined was incised deep to adipose tissue with a #15 scalpel blade.  The skin margins were undermined to an appropriate distance in all directions utilizing iris scissors.  The opposing transposition arms were then transposed into place in opposite direction and anchored with interrupted buried subcutaneous sutures. Double Z Plasty Text: The lesion was extirpated to the level of the fat with a #15 scalpel blade. Given the location of the defect, shape of the defect and the proximity to free margins a double Z-plasty was deemed most appropriate for repair. Using a sterile surgical marker, the appropriate transposition arms of the double Z-plasty were drawn incorporating the defect and placing the expected incisions within the relaxed skin tension lines where possible. The area thus outlined was incised deep to adipose tissue with a #15 scalpel blade. The skin margins were undermined to an appropriate distance in all directions utilizing iris scissors. The opposing transposition arms were then transposed and carried over into place in opposite direction and anchored with interrupted buried subcutaneous sutures. Zygomaticofacial Flap Text: Given the location of the defect, shape of the defect and the proximity to free margins a zygomaticofacial flap was deemed most appropriate for repair.  Using a sterile surgical marker, the appropriate flap was drawn incorporating the defect and placing the expected incisions within the relaxed skin tension lines where possible. The area thus outlined was incised deep to adipose tissue with a #15 scalpel blade with preservation of a vascular pedicle.  The skin margins were undermined to an appropriate distance in all directions utilizing iris scissors.  The flap was then placed into the defect and anchored with interrupted buried subcutaneous sutures. Cheek Interpolation Flap Text: A decision was made to reconstruct the defect utilizing an interpolation axial flap and a staged reconstruction.  A telfa template was made of the defect.  This telfa template was then used to outline the Cheek Interpolation flap.  The donor area for the pedicle flap was then injected with anesthesia.  The flap was excised through the skin and subcutaneous tissue down to the layer of the underlying musculature.  The interpolation flap was carefully excised within this deep plane to maintain its blood supply.  The edges of the donor site were undermined.   The donor site was closed in a primary fashion.  The pedicle was then rotated into position and sutured.  Once the tube was sutured into place, adequate blood supply was confirmed with blanching and refill.  The pedicle was then wrapped with xeroform gauze and dressed appropriately with a telfa and gauze bandage to ensure continued blood supply and protect the attached pedicle. Cheek-To-Nose Interpolation Flap Text: A decision was made to reconstruct the defect utilizing an interpolation axial flap and a staged reconstruction.  A telfa template was made of the defect.  This telfa template was then used to outline the Cheek-To-Nose Interpolation flap.  The donor area for the pedicle flap was then injected with anesthesia.  The flap was excised through the skin and subcutaneous tissue down to the layer of the underlying musculature.  The interpolation flap was carefully excised within this deep plane to maintain its blood supply.  The edges of the donor site were undermined.   The donor site was closed in a primary fashion.  The pedicle was then rotated into position and sutured.  Once the tube was sutured into place, adequate blood supply was confirmed with blanching and refill.  The pedicle was then wrapped with xeroform gauze and dressed appropriately with a telfa and gauze bandage to ensure continued blood supply and protect the attached pedicle. Interpolation Flap Text: A decision was made to reconstruct the defect utilizing an interpolation axial flap and a staged reconstruction.  A telfa template was made of the defect.  This telfa template was then used to outline the interpolation flap.  The donor area for the pedicle flap was then injected with anesthesia.  The flap was excised through the skin and subcutaneous tissue down to the layer of the underlying musculature.  The interpolation flap was carefully excised within this deep plane to maintain its blood supply.  The edges of the donor site were undermined.   The donor site was closed in a primary fashion.  The pedicle was then rotated into position and sutured.  Once the tube was sutured into place, adequate blood supply was confirmed with blanching and refill.  The pedicle was then wrapped with xeroform gauze and dressed appropriately with a telfa and gauze bandage to ensure continued blood supply and protect the attached pedicle. Melolabial Interpolation Flap Text: A decision was made to reconstruct the defect utilizing an interpolation axial flap and a staged reconstruction.  A telfa template was made of the defect.  This telfa template was then used to outline the melolabial interpolation flap.  The donor area for the pedicle flap was then injected with anesthesia.  The flap was excised through the skin and subcutaneous tissue down to the layer of the underlying musculature.  The pedicle flap was carefully excised within this deep plane to maintain its blood supply.  The edges of the donor site were undermined.   The donor site was closed in a primary fashion.  The pedicle was then rotated into position and sutured.  Once the tube was sutured into place, adequate blood supply was confirmed with blanching and refill.  The pedicle was then wrapped with xeroform gauze and dressed appropriately with a telfa and gauze bandage to ensure continued blood supply and protect the attached pedicle. Mastoid Interpolation Flap Text: A decision was made to reconstruct the defect utilizing an interpolation axial flap and a staged reconstruction.  A telfa template was made of the defect.  This telfa template was then used to outline the mastoid interpolation flap.  The donor area for the pedicle flap was then injected with anesthesia.  The flap was excised through the skin and subcutaneous tissue down to the layer of the underlying musculature.  The pedicle flap was carefully excised within this deep plane to maintain its blood supply.  The edges of the donor site were undermined.   The donor site was closed in a primary fashion.  The pedicle was then rotated into position and sutured.  Once the tube was sutured into place, adequate blood supply was confirmed with blanching and refill.  The pedicle was then wrapped with xeroform gauze and dressed appropriately with a telfa and gauze bandage to ensure continued blood supply and protect the attached pedicle. Posterior Auricular Interpolation Flap Text: A decision was made to reconstruct the defect utilizing an interpolation axial flap and a staged reconstruction.  A telfa template was made of the defect.  This telfa template was then used to outline the posterior auricular interpolation flap.  The donor area for the pedicle flap was then injected with anesthesia.  The flap was excised through the skin and subcutaneous tissue down to the layer of the underlying musculature.  The pedicle flap was carefully excised within this deep plane to maintain its blood supply.  The edges of the donor site were undermined.   The donor site was closed in a primary fashion.  The pedicle was then rotated into position and sutured.  Once the tube was sutured into place, adequate blood supply was confirmed with blanching and refill.  The pedicle was then wrapped with xeroform gauze and dressed appropriately with a telfa and gauze bandage to ensure continued blood supply and protect the attached pedicle. Paramedian Forehead Flap Text: A decision was made to reconstruct the defect utilizing an interpolation axial flap and a staged reconstruction.  A telfa template was made of the defect.  This telfa template was then used to outline the paramedian forehead pedicle flap.  The donor area for the pedicle flap was then injected with anesthesia.  The flap was excised through the skin and subcutaneous tissue down to the layer of the underlying musculature.  The pedicle flap was carefully excised within this deep plane to maintain its blood supply.  The edges of the donor site were undermined.   The donor site was closed in a primary fashion.  The pedicle was then rotated into position and sutured.  Once the tube was sutured into place, adequate blood supply was confirmed with blanching and refill.  The pedicle was then wrapped with xeroform gauze and dressed appropriately with a telfa and gauze bandage to ensure continued blood supply and protect the attached pedicle. Abbe Flap (Upper To Lower Lip) Text: The defect of the lower lip was assessed and measured.  Given the location and size of the defect, an Abbe flap was deemed most appropriate.  Using a sterile surgical marker, an appropriate Abbe flap was measured and drawn on the upper lip. Local anesthesia was then infiltrated.  A scalpel was then used to incise the upper lip through and through the skin, vermilion, muscle and mucosa, leaving the flap pedicled on the opposite side.  The flap was then rotated and transferred to the lower lip defect.  The flap was then sutured into place with a three layer technique, closing the orbicularis oris muscle layer with subcutaneous buried sutures, followed by a mucosal layer and an epidermal layer. Abbe Flap (Lower To Upper Lip) Text: The defect of the upper lip was assessed and measured.  Given the location and size of the defect, an Abbe flap was deemed most appropriate.  Using a sterile surgical marker, an appropriate Abbe flap was measured and drawn on the lower lip. Local anesthesia was then infiltrated. A scalpel was then used to incise the upper lip through and through the skin, vermilion, muscle and mucosa, leaving the flap pedicled on the opposite side.  The flap was then rotated and transferred to the lower lip defect.  The flap was then sutured into place with a three layer technique, closing the orbicularis oris muscle layer with subcutaneous buried sutures, followed by a mucosal layer and an epidermal layer. Estlander Flap (Upper To Lower Lip) Text: The defect of the lower lip was assessed and measured.  Given the location and size of the defect, an Estlander flap was deemed most appropriate.  Using a sterile surgical marker, an appropriate Estlander flap was measured and drawn on the upper lip. Local anesthesia was then infiltrated. A scalpel was then used to incise the lateral aspect of the flap, through skin, muscle and mucosa, leaving the flap pedicled medially.  The flap was then rotated and positioned to fill the lower lip defect.  The flap was then sutured into place with a three layer technique, closing the orbicularis oris muscle layer with subcutaneous buried sutures, followed by a mucosal layer and an epidermal layer. Lip Wedge Excision Repair Text: Given the location of the defect and the proximity to free margins a full thickness wedge repair was deemed most appropriate.  Using a sterile surgical marker, the appropriate repair was drawn incorporating the defect and placing the expected incisions perpendicular to the vermilion border.  The vermilion border was also meticulously outlined to ensure appropriate reapproximation during the repair.  The area thus outlined was incised through and through with a #15 scalpel blade.  The muscularis and dermis were reaproximated with deep sutures following hemostasis. Care was taken to realign the vermilion border before proceeding with the superficial closure.  Once the vermilion was realigned the superfical and mucosal closure was finished. Ftsg Text: The defect edges were debeveled with a #15 scalpel blade.  Given the location of the defect, shape of the defect and the proximity to free margins a full thickness skin graft was deemed most appropriate.  Using a sterile surgical marker, the primary defect shape was transferred to the donor site. The area thus outlined was incised deep to adipose tissue with a #15 scalpel blade.  The harvested graft was then trimmed of adipose tissue until only dermis and epidermis was left.  The skin margins of the secondary defect were undermined to an appropriate distance in all directions utilizing iris scissors.  The secondary defect was closed with interrupted buried subcutaneous sutures.  The skin edges were then re-apposed with running  sutures.  The skin graft was then placed in the primary defect and oriented appropriately. Split-Thickness Skin Graft Text: The defect edges were debeveled with a #15 scalpel blade.  Given the location of the defect, shape of the defect and the proximity to free margins a split thickness skin graft was deemed most appropriate.  Using a sterile surgical marker, the primary defect shape was transferred to the donor site. The split thickness graft was then harvested.  The skin graft was then placed in the primary defect and oriented appropriately. Pinch Graft Text: The defect edges were debeveled with a #15 scalpel blade. Given the location of the defect, shape of the defect and the proximity to free margins a pinch graft was deemed most appropriate. Using a sterile surgical marker, the primary defect shape was transferred to the donor site. The area thus outlined was incised deep to adipose tissue with a #15 scalpel blade.  The harvested graft was then trimmed of adipose tissue until only dermis and epidermis was left. The skin margins of the secondary defect were undermined to an appropriate distance in all directions utilizing iris scissors.  The secondary defect was closed with interrupted buried subcutaneous sutures.  The skin edges were then re-apposed with running  sutures.  The skin graft was then placed in the primary defect and oriented appropriately. Burow's Graft Text: The defect edges were debeveled with a #15 scalpel blade.  Given the location of the defect, shape of the defect, the proximity to free margins and the presence of a standing cone deformity a Burow's skin graft was deemed most appropriate. The standing cone was removed and this tissue was then trimmed to the shape of the primary defect. The adipose tissue was also removed until only dermis and epidermis were left.  The skin margins of the secondary defect were undermined to an appropriate distance in all directions utilizing iris scissors.  The secondary defect was closed with interrupted buried subcutaneous sutures.  The skin edges were then re-apposed with running  sutures.  The skin graft was then placed in the primary defect and oriented appropriately. Cartilage Graft Text: The defect edges were debeveled with a #15 scalpel blade.  Given the location of the defect, shape of the defect, the fact the defect involved a full thickness cartilage defect a cartilage graft was deemed most appropriate.  An appropriate donor site was identified, cleansed, and anesthetized. The cartilage graft was then harvested and transferred to the recipient site, oriented appropriately and then sutured into place.  The secondary defect was then repaired using a primary closure. Composite Graft Text: The defect edges were debeveled with a #15 scalpel blade.  Given the location of the defect, shape of the defect, the proximity to free margins and the fact the defect was full thickness a composite graft was deemed most appropriate.  The defect was outline and then transferred to the donor site.  A full thickness graft was then excised from the donor site. The graft was then placed in the primary defect, oriented appropriately and then sutured into place.  The secondary defect was then repaired using a primary closure. Epidermal Autograft Text: The defect edges were debeveled with a #15 scalpel blade.  Given the location of the defect, shape of the defect and the proximity to free margins an epidermal autograft was deemed most appropriate.  Using a sterile surgical marker, the primary defect shape was transferred to the donor site. The epidermal graft was then harvested.  The skin graft was then placed in the primary defect and oriented appropriately. Dermal Autograft Text: The defect edges were debeveled with a #15 scalpel blade.  Given the location of the defect, shape of the defect and the proximity to free margins a dermal autograft was deemed most appropriate.  Using a sterile surgical marker, the primary defect shape was transferred to the donor site. The area thus outlined was incised deep to adipose tissue with a #15 scalpel blade.  The harvested graft was then trimmed of adipose and epidermal tissue until only dermis was left.  The skin graft was then placed in the primary defect and oriented appropriately. Skin Substitute Text: The defect edges were debeveled with a #15 scalpel blade.  Given the location of the defect, shape of the defect and the proximity to free margins a skin substitute graft was deemed most appropriate.  The graft material was trimmed to fit the size of the defect. The graft was then placed in the primary defect and oriented appropriately. Tissue Cultured Epidermal Autograft Text: The defect edges were debeveled with a #15 scalpel blade.  Given the location of the defect, shape of the defect and the proximity to free margins a tissue cultured epidermal autograft was deemed most appropriate.  The graft was then trimmed to fit the size of the defect.  The graft was then placed in the primary defect and oriented appropriately. Xenograft Text: The defect edges were debeveled with a #15 scalpel blade.  Given the location of the defect, shape of the defect and the proximity to free margins a xenograft was deemed most appropriate.  The graft was then trimmed to fit the size of the defect.  The graft was then placed in the primary defect and oriented appropriately. Purse String (Intermediate) Text: Given the location of the defect and the characteristics of the surrounding skin a purse string intermediate closure was deemed most appropriate.  Undermining was performed circumferentially around the surgical defect.  A purse string suture was then placed and tightened. Purse String (Simple) Text: Given the location of the defect and the characteristics of the surrounding skin a purse string simple closure was deemed most appropriate.  Undermining was performed circumferentially around the surgical defect.  A purse string suture was then placed and tightened. Partial Purse String (Intermediate) Text: Given the location of the defect and the characteristics of the surrounding skin an intermediate purse string closure was deemed most appropriate.  Undermining was performed circumferentially around the surgical defect.  A purse string suture was then placed and tightened. Wound tension of the circular defect prevented complete closure of the wound. Partial Purse String (Simple) Text: Given the location of the defect and the characteristics of the surrounding skin a simple purse string closure was deemed most appropriate.  Undermining was performed circumferentially around the surgical defect.  A purse string suture was then placed and tightened. Wound tension of the circular defect prevented complete closure of the wound. Complex Repair And Single Advancement Flap Text: The defect edges were debeveled with a #15 scalpel blade.  The primary defect was closed partially with a complex linear closure.  Given the location of the remaining defect, shape of the defect and the proximity to free margins a single advancement flap was deemed most appropriate for complete closure of the defect.  Using a sterile surgical marker, an appropriate advancement flap was drawn incorporating the defect and placing the expected incisions within the relaxed skin tension lines where possible.    The area thus outlined was incised deep to adipose tissue with a #15 scalpel blade.  The skin margins were undermined to an appropriate distance in all directions utilizing iris scissors. Complex Repair And Double Advancement Flap Text: The defect edges were debeveled with a #15 scalpel blade.  The primary defect was closed partially with a complex linear closure.  Given the location of the remaining defect, shape of the defect and the proximity to free margins a double advancement flap was deemed most appropriate for complete closure of the defect.  Using a sterile surgical marker, an appropriate advancement flap was drawn incorporating the defect and placing the expected incisions within the relaxed skin tension lines where possible.    The area thus outlined was incised deep to adipose tissue with a #15 scalpel blade.  The skin margins were undermined to an appropriate distance in all directions utilizing iris scissors. Complex Repair And Modified Advancement Flap Text: The defect edges were debeveled with a #15 scalpel blade.  The primary defect was closed partially with a complex linear closure.  Given the location of the remaining defect, shape of the defect and the proximity to free margins a modified advancement flap was deemed most appropriate for complete closure of the defect.  Using a sterile surgical marker, an appropriate advancement flap was drawn incorporating the defect and placing the expected incisions within the relaxed skin tension lines where possible.    The area thus outlined was incised deep to adipose tissue with a #15 scalpel blade.  The skin margins were undermined to an appropriate distance in all directions utilizing iris scissors. Complex Repair And A-T Advancement Flap Text: The defect edges were debeveled with a #15 scalpel blade.  The primary defect was closed partially with a complex linear closure.  Given the location of the remaining defect, shape of the defect and the proximity to free margins an A-T advancement flap was deemed most appropriate for complete closure of the defect.  Using a sterile surgical marker, an appropriate advancement flap was drawn incorporating the defect and placing the expected incisions within the relaxed skin tension lines where possible.    The area thus outlined was incised deep to adipose tissue with a #15 scalpel blade.  The skin margins were undermined to an appropriate distance in all directions utilizing iris scissors. Complex Repair And O-T Advancement Flap Text: The defect edges were debeveled with a #15 scalpel blade.  The primary defect was closed partially with a complex linear closure.  Given the location of the remaining defect, shape of the defect and the proximity to free margins an O-T advancement flap was deemed most appropriate for complete closure of the defect.  Using a sterile surgical marker, an appropriate advancement flap was drawn incorporating the defect and placing the expected incisions within the relaxed skin tension lines where possible.    The area thus outlined was incised deep to adipose tissue with a #15 scalpel blade.  The skin margins were undermined to an appropriate distance in all directions utilizing iris scissors. Complex Repair And O-L Flap Text: The defect edges were debeveled with a #15 scalpel blade.  The primary defect was closed partially with a complex linear closure.  Given the location of the remaining defect, shape of the defect and the proximity to free margins an O-L flap was deemed most appropriate for complete closure of the defect.  Using a sterile surgical marker, an appropriate flap was drawn incorporating the defect and placing the expected incisions within the relaxed skin tension lines where possible.    The area thus outlined was incised deep to adipose tissue with a #15 scalpel blade.  The skin margins were undermined to an appropriate distance in all directions utilizing iris scissors. Complex Repair And Bilobe Flap Text: The defect edges were debeveled with a #15 scalpel blade.  The primary defect was closed partially with a complex linear closure.  Given the location of the remaining defect, shape of the defect and the proximity to free margins a bilobe flap was deemed most appropriate for complete closure of the defect.  Using a sterile surgical marker, an appropriate advancement flap was drawn incorporating the defect and placing the expected incisions within the relaxed skin tension lines where possible.    The area thus outlined was incised deep to adipose tissue with a #15 scalpel blade.  The skin margins were undermined to an appropriate distance in all directions utilizing iris scissors. Complex Repair And Melolabial Flap Text: The defect edges were debeveled with a #15 scalpel blade.  The primary defect was closed partially with a complex linear closure.  Given the location of the remaining defect, shape of the defect and the proximity to free margins a melolabial flap was deemed most appropriate for complete closure of the defect.  Using a sterile surgical marker, an appropriate advancement flap was drawn incorporating the defect and placing the expected incisions within the relaxed skin tension lines where possible.    The area thus outlined was incised deep to adipose tissue with a #15 scalpel blade.  The skin margins were undermined to an appropriate distance in all directions utilizing iris scissors. Complex Repair And Rotation Flap Text: The defect edges were debeveled with a #15 scalpel blade.  The primary defect was closed partially with a complex linear closure.  Given the location of the remaining defect, shape of the defect and the proximity to free margins a rotation flap was deemed most appropriate for complete closure of the defect.  Using a sterile surgical marker, an appropriate advancement flap was drawn incorporating the defect and placing the expected incisions within the relaxed skin tension lines where possible.    The area thus outlined was incised deep to adipose tissue with a #15 scalpel blade.  The skin margins were undermined to an appropriate distance in all directions utilizing iris scissors. Complex Repair And Rhombic Flap Text: The defect edges were debeveled with a #15 scalpel blade.  The primary defect was closed partially with a complex linear closure.  Given the location of the remaining defect, shape of the defect and the proximity to free margins a rhombic flap was deemed most appropriate for complete closure of the defect.  Using a sterile surgical marker, an appropriate advancement flap was drawn incorporating the defect and placing the expected incisions within the relaxed skin tension lines where possible.    The area thus outlined was incised deep to adipose tissue with a #15 scalpel blade.  The skin margins were undermined to an appropriate distance in all directions utilizing iris scissors. Complex Repair And Transposition Flap Text: The defect edges were debeveled with a #15 scalpel blade.  The primary defect was closed partially with a complex linear closure.  Given the location of the remaining defect, shape of the defect and the proximity to free margins a transposition flap was deemed most appropriate for complete closure of the defect.  Using a sterile surgical marker, an appropriate advancement flap was drawn incorporating the defect and placing the expected incisions within the relaxed skin tension lines where possible.    The area thus outlined was incised deep to adipose tissue with a #15 scalpel blade.  The skin margins were undermined to an appropriate distance in all directions utilizing iris scissors. Complex Repair And V-Y Plasty Text: The defect edges were debeveled with a #15 scalpel blade.  The primary defect was closed partially with a complex linear closure.  Given the location of the remaining defect, shape of the defect and the proximity to free margins a V-Y plasty was deemed most appropriate for complete closure of the defect.  Using a sterile surgical marker, an appropriate advancement flap was drawn incorporating the defect and placing the expected incisions within the relaxed skin tension lines where possible.    The area thus outlined was incised deep to adipose tissue with a #15 scalpel blade.  The skin margins were undermined to an appropriate distance in all directions utilizing iris scissors. Complex Repair And M Plasty Text: The defect edges were debeveled with a #15 scalpel blade.  The primary defect was closed partially with a complex linear closure.  Given the location of the remaining defect, shape of the defect and the proximity to free margins an M plasty was deemed most appropriate for complete closure of the defect.  Using a sterile surgical marker, an appropriate advancement flap was drawn incorporating the defect and placing the expected incisions within the relaxed skin tension lines where possible.    The area thus outlined was incised deep to adipose tissue with a #15 scalpel blade.  The skin margins were undermined to an appropriate distance in all directions utilizing iris scissors. Complex Repair And Double M Plasty Text: The defect edges were debeveled with a #15 scalpel blade.  The primary defect was closed partially with a complex linear closure.  Given the location of the remaining defect, shape of the defect and the proximity to free margins a double M plasty was deemed most appropriate for complete closure of the defect.  Using a sterile surgical marker, an appropriate advancement flap was drawn incorporating the defect and placing the expected incisions within the relaxed skin tension lines where possible.    The area thus outlined was incised deep to adipose tissue with a #15 scalpel blade.  The skin margins were undermined to an appropriate distance in all directions utilizing iris scissors. Complex Repair And W Plasty Text: The defect edges were debeveled with a #15 scalpel blade.  The primary defect was closed partially with a complex linear closure.  Given the location of the remaining defect, shape of the defect and the proximity to free margins a W plasty was deemed most appropriate for complete closure of the defect.  Using a sterile surgical marker, an appropriate advancement flap was drawn incorporating the defect and placing the expected incisions within the relaxed skin tension lines where possible.    The area thus outlined was incised deep to adipose tissue with a #15 scalpel blade.  The skin margins were undermined to an appropriate distance in all directions utilizing iris scissors. Complex Repair And Z Plasty Text: The defect edges were debeveled with a #15 scalpel blade.  The primary defect was closed partially with a complex linear closure.  Given the location of the remaining defect, shape of the defect and the proximity to free margins a Z plasty was deemed most appropriate for complete closure of the defect.  Using a sterile surgical marker, an appropriate advancement flap was drawn incorporating the defect and placing the expected incisions within the relaxed skin tension lines where possible.    The area thus outlined was incised deep to adipose tissue with a #15 scalpel blade.  The skin margins were undermined to an appropriate distance in all directions utilizing iris scissors. Complex Repair And Dorsal Nasal Flap Text: The defect edges were debeveled with a #15 scalpel blade.  The primary defect was closed partially with a complex linear closure.  Given the location of the remaining defect, shape of the defect and the proximity to free margins a dorsal nasal flap was deemed most appropriate for complete closure of the defect.  Using a sterile surgical marker, an appropriate flap was drawn incorporating the defect and placing the expected incisions within the relaxed skin tension lines where possible.    The area thus outlined was incised deep to adipose tissue with a #15 scalpel blade.  The skin margins were undermined to an appropriate distance in all directions utilizing iris scissors. Complex Repair And Ftsg Text: The defect edges were debeveled with a #15 scalpel blade.  The primary defect was closed partially with a complex linear closure.  Given the location of the defect, shape of the defect and the proximity to free margins a full thickness skin graft was deemed most appropriate to repair the remaining defect.  The graft was trimmed to fit the size of the remaining defect.  The graft was then placed in the primary defect, oriented appropriately, and sutured into place. Complex Repair And Burow's Graft Text: The defect edges were debeveled with a #15 scalpel blade.  The primary defect was closed partially with a complex linear closure.  Given the location of the defect, shape of the defect, the proximity to free margins and the presence of a standing cone deformity a Burow's graft was deemed most appropriate to repair the remaining defect.  The graft was trimmed to fit the size of the remaining defect.  The graft was then placed in the primary defect, oriented appropriately, and sutured into place. Complex Repair And Split-Thickness Skin Graft Text: The defect edges were debeveled with a #15 scalpel blade.  The primary defect was closed partially with a complex linear closure.  Given the location of the defect, shape of the defect and the proximity to free margins a split thickness skin graft was deemed most appropriate to repair the remaining defect.  The graft was trimmed to fit the size of the remaining defect.  The graft was then placed in the primary defect, oriented appropriately, and sutured into place. Complex Repair And Epidermal Autograft Text: The defect edges were debeveled with a #15 scalpel blade.  The primary defect was closed partially with a complex linear closure.  Given the location of the defect, shape of the defect and the proximity to free margins an epidermal autograft was deemed most appropriate to repair the remaining defect.  The graft was trimmed to fit the size of the remaining defect.  The graft was then placed in the primary defect, oriented appropriately, and sutured into place. Complex Repair And Dermal Autograft Text: The defect edges were debeveled with a #15 scalpel blade.  The primary defect was closed partially with a complex linear closure.  Given the location of the defect, shape of the defect and the proximity to free margins an dermal autograft was deemed most appropriate to repair the remaining defect.  The graft was trimmed to fit the size of the remaining defect.  The graft was then placed in the primary defect, oriented appropriately, and sutured into place. Complex Repair And Tissue Cultured Epidermal Autograft Text: The defect edges were debeveled with a #15 scalpel blade.  The primary defect was closed partially with a complex linear closure.  Given the location of the defect, shape of the defect and the proximity to free margins an tissue cultured epidermal autograft was deemed most appropriate to repair the remaining defect.  The graft was trimmed to fit the size of the remaining defect.  The graft was then placed in the primary defect, oriented appropriately, and sutured into place. Complex Repair And Xenograft Text: The defect edges were debeveled with a #15 scalpel blade.  The primary defect was closed partially with a complex linear closure.  Given the location of the defect, shape of the defect and the proximity to free margins a xenograft was deemed most appropriate to repair the remaining defect.  The graft was trimmed to fit the size of the remaining defect.  The graft was then placed in the primary defect, oriented appropriately, and sutured into place. Complex Repair And Skin Substitute Graft Text: The defect edges were debeveled with a #15 scalpel blade.  The primary defect was closed partially with a complex linear closure.  Given the location of the remaining defect, shape of the defect and the proximity to free margins a skin substitute graft was deemed most appropriate to repair the remaining defect.  The graft was trimmed to fit the size of the remaining defect.  The graft was then placed in the primary defect, oriented appropriately, and sutured into place. Path Notes (To The Dermatopathologist): 3.5cm x 2.0cm\\nClosure 3.6cm\\nPlease check margins Consent was obtained from the patient. The risks and benefits to therapy were discussed in detail. Specifically, the risks of infection, scarring, bleeding, prolonged wound healing, incomplete removal, allergy to anesthesia, nerve injury and recurrence were addressed. Prior to the procedure, the treatment site was clearly identified and confirmed by the patient. All components of Universal Protocol/PAUSE Rule completed. Post-Care Instructions: I reviewed with the patient in detail post-care instructions. Patient is not to engage in any heavy lifting, exercise, or swimming for the next 14 days. Should the patient develop any fevers, chills, bleeding, severe pain patient will contact the office immediately. Home Suture Removal Text: Patient was provided a home suture removal kit and will remove their sutures at home.  If they have any questions or difficulties they will call the office. Where Do You Want The Question To Include Opioid Counseling Located?: Case Summary Tab Information: Selecting Yes will display possible errors in your note based on the variables you have selected. This validation is only offered as a suggestion for you. PLEASE NOTE THAT THE VALIDATION TEXT WILL BE REMOVED WHEN YOU FINALIZE YOUR NOTE. IF YOU WANT TO FAX A PRELIMINARY NOTE YOU WILL NEED TO TOGGLE THIS TO 'NO' IF YOU DO NOT WANT IT IN YOUR FAXED NOTE.

## 2024-01-22 NOTE — ED ADULT NURSE NOTE - NS ED NURSE LEVEL OF CONSCIOUSNESS AFFECT
Physical Therapy    Physical Therapy Evaluation    Patient Name: Ramón Flores  MRN: [id number]  Today's Date: 1/22/2024   Time Calculation  Start Time: 0902  Stop Time: 0922  Time Calculation (min): 20 min    Assessment/Plan   PT Assessment  PT Assessment Results: Decreased strength, Decreased range of motion, Decreased endurance, Impaired balance, Decreased mobility, Decreased safety awareness  Rehab Prognosis: Good  End of Session Communication: Bedside nurse  End of Session Patient Position: Bed, 3 rail up, Alarm on  IP OR SWING BED PT PLAN  Inpatient or Swing Bed: Inpatient  PT Plan  Treatment/Interventions: Bed mobility, Transfer training, Gait training, Balance training, Strengthening, Range of motion, Therapeutic exercise  PT Plan: Skilled PT  PT Frequency: 3 times per week  PT Discharge Recommendations: Moderate intensity level of continued care  PT Recommended Transfer Status: Assist x1  PT - OK to Discharge: Yes      Subjective   General Visit Information:  Reason for Referral: CAP  Past Medical History Relevant to Rehab: Hemophilia A, Protein C deficiency, HTN, Afib, HFpEF, HLD, nephrolithiasis, CAD s/p CABG (2002), BPH, and R fem neck fx s/p THR 1/13/23)  Co-Treatment: OT  Co-Treatment Reason: to maximize mobility and safety  Prior to Session Communication: Bedside nurse  Patient Position Received: Bed, 3 rail up, Alarm on  General Comment: supine in bed, willing to participate   Home Living:  Home Living  Type of Home: House  Lives With: Alone (has a HHA; uncertain how often the HHA visits)  Home Adaptive Equipment:  (walker, 3 wheelchairs)  Home Layout: Two level (stair lift to second level)  Home Access: Stairs to enter with rails  Entrance Stairs-Number of Steps: 2  Prior Level of Function:  Prior Function Per Pt/Caregiver Report  ADL Assistance: Needs assistance  Homemaking Assistance: Needs assistance  Ambulatory Assistance: Independent (ambulates household distances with a walker; also uses a  "wheelchair)  Prior Function Comments: one fall this admission; stated,  \"I slid out of the wheelchair\"  Precautions:  Precautions  Medical Precautions: Fall precautions       Objective     Pain:  Pain Assessment  Pain Assessment: 0-10  Pain Score: 0 - No pain  Cognition:  Cognition  Overall Cognitive Status: Within Functional Limits      Extremity/Trunk Assessments:  Strength:     RLE   RLE : Exceptions to WFL  AROM RLE (degrees)  RLE AROM Comment: hip/ankle WFL  R Knee Extension 0-130: 60  Strength RLE  RLE Overall Strength: Greater than or equal to 3/5 as evidenced by functional mobility  LLE   LLE : Exceptions to WFL  AROM LLE (degrees)  LLE AROM Comment: hip/ankle WFL  L Knee Extension 0-130: 45  Strength LLE  LLE Overall Strength: Greater than or equal to 3/5 as evidenced by functional mobility    General Assessments:     Sensation  Light Touch: No apparent deficits     Static Sitting Balance  Static Sitting-Level of Assistance: Close supervision  Dynamic Sitting Balance  Dynamic Sitting-Comments: CGA  Static Standing Balance  Static Standing-Level of Assistance: Minimum assistance (with walker)  Dynamic Standing Balance  Dynamic Standing-Comments: mod assist with walker    Functional Assessments:  Bed Mobility  Bed Mobility: Yes  Bed Mobility 1  Bed Mobility 1: Supine to sitting, Sitting to supine  Level of Assistance 1: Contact guard  Bed Mobility Comments 1: HOB elevated, use of bed rails  Transfers  Transfer: Yes  Transfer 1  Technique 1: Sit to stand, Stand to sit  Transfer Device 1: Walker  Transfer Level of Assistance 1: Moderate assistance (x1)  Trials/Comments 1: verbal cues for hand placement; increased posterior lean initially upon standing  Ambulation/Gait Training  Ambulation/Gait Training Performed: Yes  Ambulation/Gait Training 1  Device 1: Rolling walker  Assistance 1: Minimum assistance  Quality of Gait 1: Decreased step length, Forward flexed posture (increased bilateral knee flexion, pt " ambulating on forefoot due to inability to place heel on floor, decreased jese; verbal cues for safety)  Comments/Distance (ft) 1: 5 feet       Outcome Measures:  [facility] Basic Mobility  Turning from your back to your side while in a flat bed without using bedrails: A little  Moving from lying on your back to sitting on the side of a flat bed without using bedrails: A little  Moving to and from bed to chair (including a wheelchair): A little  Standing up from a chair using your arms (e.g. wheelchair or bedside chair): A lot  To walk in hospital room: A little  Climbing 3-5 steps with railing: Total  Basic Mobility - Total Score: 15       Encounter Problems       Encounter Problems (Active)       Balance       SBA static stance with walker, CGA dynamic stance with walker       Start:  01/22/24    Expected End:  02/12/24               Mobility       STG - Patient will ambulate 25 feet with walker with CGA       Start:  01/22/24    Expected End:  02/12/24               Transfers       STG - Patient will perform bed mobility Modified independent        Start:  01/22/24    Expected End:  02/12/24            STG - Patient will transfer sit to and from stand with walker with CGA       Start:  01/22/24    Expected End:  02/12/24                   Education Documentation  Precautions, taught by Kaye Foster PT at 1/22/2024 11:00 AM.  Learner: Patient  Readiness: Acceptance  Method: Explanation  Response: Needs Reinforcement    Mobility Training, taught by Kaye Foster PT at 1/22/2024 11:00 AM.  Learner: Patient  Readiness: Acceptance  Method: Explanation  Response: Needs Reinforcement    Education Comments  No comments found.            01/22/24 at 11:02 AM   Kaye Foster PT      Calm/Appropriate

## 2024-03-21 NOTE — ED ADULT NURSE NOTE - PAIN RATING/NUMBER SCALE (0-10): REST
Patient Education     4 Steps for Eating Healthier  Changing the way you eat can improve your health. It can lower your cholesterol and blood pressure, and help you stay at a healthy weight. Your diet doesn’t have to be bland and boring to be healthy. Just watch your calories and follow these steps:    Step 1. Eat fewer unhealthy fats  Choose more fish and lean meats instead of fatty cuts of meat.  Skip butter and lard, and use less margarine.  Pass on foods that have palm, coconut, or hydrogenated oils.  Eat fewer high-fat dairy foods like cheese, ice cream, and whole milk.  Get a heart-healthy cookbook and try some low-fat recipes.  Step 2. Go light on salt  Keep the saltshaker off the table.  Limit high-salt ingredients, such as soy sauce, bouillon, and garlic salt.  Instead of adding salt when cooking, season your food with herbs and flavorings. Try lemon, garlic, and onion, or salt-free herb seasonings.  Limit convenience foods, such as boxed or canned foods and restaurant food.  Read food labels and choose lower-sodium options.  Step 3. Limit sugar  Pause before you add sugars to pancakes, cereal, coffee, or tea. This includes white and brown table sugar, syrup, honey, and molasses. Cut your usual amount by half.  Use non-sugar sweeteners. Stevia, aspartame, and sucralose can satisfy a sweet tooth without adding calories.  Swap out sugar-filled soda and other drinks. Buy sugar-free or low-calorie beverages. Remember water is always the best choice.  Read labels and choose foods with less added sugar. Keep in mind that dairy foods and foods with fruit will have some natural sugar.  Cut the sugar in recipes by 1/3 to 1/2. Boost the flavor with extracts like almond, vanilla, or orange. Or add spices such as cinnamon or nutmeg.  Step 4. Eat more fiber  Eat fresh fruits and vegetables every day.  Boost your diet with whole grains. Go for oats, whole-grain rice, and bran.  Add beans and lentils to your meals.  Drink  more water to match your fiber increase to help prevent constipation.  Date Last Reviewed: 6/1/2017  © 9161-9525 [employer], [employer]. [address] [facility] [facility], [place] [address]. All rights reserved. This information is not intended as a substitute for professional medical care. Always follow your healthcare professional's instructions.         Patient Education     Aerobic Exercise for a Healthy Heart  Exercise is a lot more than an energy booster and a stress reliever. It also strengthens your heart muscle, lowers your blood pressure and cholesterol, and burns calories. It can also improve your resting muscle tone, and your mood.     Choose an aerobic activity  Choose an activity that makes your heart and lungs work harder than they do when you rest or walk normally. This aerobic exercise can improve the way your heart and other muscles use oxygen. Make it fun by exercising with a friend and choosing an activity you enjoy. Here are some ideas:  Walking  Swimming  Bicycling  Stair climbing  Dancing  Jogging  Gardening  Exercise regularly  If you haven’t been exercising regularly,  get your doctor’s OK first. Then start slowly.  Here are some tips:  Begin exercising 3 times a week for 5 to 10 minutes at a time.  When you feel comfortable, add a few minutes each session.  Slowly build up to exercising 3 to 4 times each week. Each session should last for 40 minutes, on average, and involve moderate- to vigorous-intensity physical activity.  Your goal should be at least 30 minutes of moderate-intensity aerobic activity at least 5 days per week for a total of 150 or at least 25 minutes of vigorous aerobic activity at least 3 days per week for a total of 75 minutes  If you have been given nitroglycerin, be sure to carry it when you exercise.  If you get chest pain (angina) when you’re exercising, stop what you’re doing, take your nitroglycerin, and call your doctor.  [employer] last reviewed this educational content on  6/1/2019  © 5442-2303 [employer], [employer]. [address] [place] [address], [place] [address]. All rights reserved. This information is not intended as a substitute for professional medical care. Always follow your healthcare professional's instructions.            4

## 2024-07-08 NOTE — ED ADULT TRIAGE NOTE - MEANS OF ARRIVAL
DM2, TBI, DM2, sickle cell with LE wounds, sepsis with cellulitis  physical as above  continue vanco/zosyn/clinda empirically, await BC results  off pressors  affect is labile and will ask psychiatry to see her  original obtundation has resolved  follow sugars on insulin and diabetic diet ambulatory

## 2024-10-10 NOTE — ED ADULT NURSE NOTE - NSFALLRSKUNASSIST_ED_ALL_ED
Medical Necessity Information: It is in your best interest to select a reason for this procedure from the list below. All of these items fulfill various CMS LCD requirements except the new and changing color options.
Medical Necessity Clause: This procedure was medically necessary because the lesion that was treated was:
Lab: -97
Lab Facility: 3
Detail Level: Detailed
Was A Bandage Applied: Yes
Size Of Lesion In Cm (Required): 0.8
X Size Of Lesion In Cm (Optional): 0
Depth Of Shave: dermis
Biopsy Method: Dermablade
Anesthesia Type: 1% lidocaine with epinephrine
Hemostasis: Drysol
Wound Care: Petrolatum
Render Path Notes In Note?: No
Consent was obtained from the patient. The risks and benefits to therapy were discussed in detail. Specifically, the risks of infection, scarring, bleeding, prolonged wound healing, incomplete removal, allergy to anesthesia, nerve injury and recurrence were addressed. Prior to the procedure, the treatment site was clearly identified and confirmed by the patient. All components of Universal Protocol/PAUSE Rule completed.
Post-Care Instructions: I reviewed with the patient in detail post-care instructions. Patient is to keep the biopsy site dry overnight, and then apply bacitracin twice daily until healed. Patient may apply hydrogen peroxide soaks to remove any crusting.
Notification Instructions: Patient will be notified of pathology results. However, patient instructed to call the office if not contacted within 2 weeks.
Billing Type: Third-Party Bill
Size Of Lesion In Cm (Required): 0.7
no

## 2024-10-17 NOTE — ED ADULT NURSE NOTE - SEPSIS REFERENCE DATA CRITERIA 1
[Use of Plain Language] : use of plain language [Adequate] : adequate [None] : none Abormal VS: Temp > 100F or < 96.8F; SBP < 90 mmHG; HR > 120bpm; Resp > 24/min

## 2025-06-03 NOTE — PATIENT PROFILE ADULT - HAVE YOU EXPERIENCED VIOLENCE OR A TRAUMATIC EVENT?
SOCIAL HISTORY  Smoking - Denied  EtOH - Denied   Drugs - Denied    FUNCTIONAL HISTORY  lives in apartment with wife with 2 sets of stairs both with 1 rail, was using a rolling walker for the past month due to BLE weakness but prior to that pt ambulated independently, right hand dominant, wears glasses    CURRENT FUNCTIONAL STATUS  - Bed Mobility: CGA  - Transfers: CGA   - Gait: Min A   - ADLs: Max A for LBD SOCIAL HISTORY  Smoking - Current everyday cigarette smoker. Last use prior to 5/26/25  EtOH - Last use ~ 4 months ago  Drugs - Denied    FUNCTIONAL HISTORY  Occupation:  at [employer]  lives in apartment with wife with 2 sets of stairs both with 1 rail, was using a rolling walker for the past month due to BLE weakness but prior to that pt ambulated independently, right hand dominant, wears glasses    CURRENT FUNCTIONAL STATUS  - Bed Mobility: CGA  - Transfers: CGA   - Gait: Min A   - ADLs: Max A for LBD no

## 2025-06-30 NOTE — PROGRESS NOTE ADULT - REASON FOR ADMISSION
"Medication was order as \" fill later\". Please send to the pharmacy    Reason for call:   [x] Refill   [] Prior Auth  [] Other:     Office:   [x] PCP/Provider - CRISTINO SOUSA  Authorized By: Dajuan Goodman MD    [] Specialty/Provider -     Medication: venlafaxine (EFFEXOR-XR) 150 mg 24 hr capsule    Dose/Frequency: Take 1 capsule (150 mg total) by mouth 2 (two) times a day    Quantity: 180    Pharmacy: [facility] - [place] - [facility]     Local Pharmacy   Does the patient have enough for 3 days?   [] Yes   [] No - Send as HP to POD    Mail Away Pharmacy   Does the patient have enough for 10 days?   [x] Yes   [] No - Send as HP to POD    "
LLE cellulitis